# Patient Record
Sex: FEMALE | Race: WHITE | NOT HISPANIC OR LATINO | Employment: OTHER | URBAN - METROPOLITAN AREA
[De-identification: names, ages, dates, MRNs, and addresses within clinical notes are randomized per-mention and may not be internally consistent; named-entity substitution may affect disease eponyms.]

---

## 2017-01-02 DIAGNOSIS — I10 ESSENTIAL (PRIMARY) HYPERTENSION: ICD-10-CM

## 2017-01-02 DIAGNOSIS — E11.9 TYPE 2 DIABETES MELLITUS WITHOUT COMPLICATIONS (HCC): ICD-10-CM

## 2017-01-02 DIAGNOSIS — E78.5 HYPERLIPIDEMIA: ICD-10-CM

## 2017-01-20 ENCOUNTER — ALLSCRIPTS OFFICE VISIT (OUTPATIENT)
Dept: OTHER | Facility: OTHER | Age: 77
End: 2017-01-20

## 2017-02-01 ENCOUNTER — LAB CONVERSION - ENCOUNTER (OUTPATIENT)
Dept: OTHER | Facility: OTHER | Age: 77
End: 2017-02-01

## 2017-02-01 LAB — QUESTION/PROBLEM (HISTORICAL): NORMAL

## 2017-02-04 ENCOUNTER — LAB CONVERSION - ENCOUNTER (OUTPATIENT)
Dept: OTHER | Facility: OTHER | Age: 77
End: 2017-02-04

## 2017-02-04 LAB
CONTACT: (HISTORICAL): NORMAL
FECAL GLOBIN BY IMMUNOCHEM (HISTORICAL): NORMAL
TEST INFORMATION (HISTORICAL): NORMAL
TEST NAME (HISTORICAL): NORMAL

## 2017-05-05 ENCOUNTER — GENERIC CONVERSION - ENCOUNTER (OUTPATIENT)
Dept: OTHER | Facility: OTHER | Age: 77
End: 2017-05-05

## 2017-07-01 DIAGNOSIS — I10 ESSENTIAL (PRIMARY) HYPERTENSION: ICD-10-CM

## 2017-07-01 DIAGNOSIS — E11.9 TYPE 2 DIABETES MELLITUS WITHOUT COMPLICATIONS (HCC): ICD-10-CM

## 2017-07-01 DIAGNOSIS — E78.5 HYPERLIPIDEMIA: ICD-10-CM

## 2017-07-01 DIAGNOSIS — E07.9 DISORDER OF THYROID: ICD-10-CM

## 2017-07-11 ENCOUNTER — LAB CONVERSION - ENCOUNTER (OUTPATIENT)
Dept: OTHER | Facility: OTHER | Age: 77
End: 2017-07-11

## 2017-07-11 LAB
BUN SERPL-MCNC: 19 MG/DL (ref 7–25)
BUN/CREA RATIO (HISTORICAL): 20 (CALC) (ref 6–22)
CALCIUM SERPL-MCNC: 9.6 MG/DL (ref 8.6–10.4)
CHLORIDE SERPL-SCNC: 100 MMOL/L (ref 98–110)
CHOLEST SERPL-MCNC: 153 MG/DL (ref 125–200)
CHOLEST/HDLC SERPL: 2.8 (CALC)
CO2 SERPL-SCNC: 29 MMOL/L (ref 20–31)
CREAT SERPL-MCNC: 0.94 MG/DL (ref 0.6–0.93)
CREATININE, RANDOM URINE (HISTORICAL): 121 MG/DL (ref 20–320)
EGFR AFRICAN AMERICAN (HISTORICAL): 69 ML/MIN/1.73M2
EGFR-AMERICAN CALC (HISTORICAL): 59 ML/MIN/1.73M2
GLUCOSE (HISTORICAL): 162 MG/DL (ref 65–99)
HBA1C MFR BLD HPLC: 7.2 % OF TOTAL HGB
HDLC SERPL-MCNC: 55 MG/DL
LDL CHOLESTEROL (HISTORICAL): 72 MG/DL (CALC)
MAGNESIUM, UR (HISTORICAL): 0.7 MG/DL
MICROALBUMIN/CREATININE RATIO (HISTORICAL): 6 MCG/MG CREAT
NON-HDL-CHOL (CHOL-HDL) (HISTORICAL): 98 MG/DL (CALC)
POTASSIUM SERPL-SCNC: 4.5 MMOL/L (ref 3.5–5.3)
SODIUM SERPL-SCNC: 138 MMOL/L (ref 135–146)
T4 FREE SERPL-MCNC: 1.6 NG/DL (ref 0.8–1.8)
TRIGL SERPL-MCNC: 132 MG/DL
TSH SERPL DL<=0.05 MIU/L-ACNC: 0.37 MIU/L (ref 0.4–4.5)

## 2017-07-18 ENCOUNTER — ALLSCRIPTS OFFICE VISIT (OUTPATIENT)
Dept: OTHER | Facility: OTHER | Age: 77
End: 2017-07-18

## 2017-07-25 ENCOUNTER — ALLSCRIPTS OFFICE VISIT (OUTPATIENT)
Dept: OTHER | Facility: OTHER | Age: 77
End: 2017-07-25

## 2017-08-28 DIAGNOSIS — E07.9 DISORDER OF THYROID: ICD-10-CM

## 2017-09-18 ENCOUNTER — GENERIC CONVERSION - ENCOUNTER (OUTPATIENT)
Dept: OTHER | Facility: OTHER | Age: 77
End: 2017-09-18

## 2017-09-19 ENCOUNTER — APPOINTMENT (OUTPATIENT)
Dept: LAB | Facility: MEDICAL CENTER | Age: 77
End: 2017-09-19
Payer: MEDICARE

## 2017-09-19 DIAGNOSIS — E07.9 DISORDER OF THYROID: ICD-10-CM

## 2017-09-19 LAB — TSH SERPL DL<=0.05 MIU/L-ACNC: 1.05 UIU/ML (ref 0.36–3.74)

## 2017-09-19 PROCEDURE — 84443 ASSAY THYROID STIM HORMONE: CPT

## 2017-09-19 PROCEDURE — 36415 COLL VENOUS BLD VENIPUNCTURE: CPT

## 2018-01-10 NOTE — RESULT NOTES
Verified Results  (1) TSH WITH FT4 REFLEX [id number] 09:18AM [facility] Order Number: [id number]     Test Name Result Flag Reference   TSH 1 050 uIU/mL  0 358-3 740   Patients undergoing fluorescein dye angiography may retain small amounts of fluorescein in the body for 48-72 hours post procedure  Samples containing fluorescein can produce falsely depressed TSH values  If the patient had this procedure,a specimen should be resubmitted post fluorescein clearance            The recommended reference ranges for TSH during pregnancy are as follows:  First trimester 0 1 to 2 5 uIU/mL  Second trimester  0 2 to 3 0 uIU/mL  Third trimester 0 3 to 3 0 uIU/m       Plan  Thyroid disorder    · Levothyroxine Sodium 50 MCG Oral Tablet; TAKE 1 TABLET DAILY

## 2018-01-11 ENCOUNTER — LAB CONVERSION - ENCOUNTER (OUTPATIENT)
Dept: OTHER | Facility: OTHER | Age: 78
End: 2018-01-11

## 2018-01-11 LAB
BUN SERPL-MCNC: 20 MG/DL (ref 7–25)
BUN/CREA RATIO (HISTORICAL): 21 (CALC) (ref 6–22)
CALCIUM SERPL-MCNC: 9.4 MG/DL (ref 8.6–10.4)
CHLORIDE SERPL-SCNC: 102 MMOL/L (ref 98–110)
CO2 SERPL-SCNC: 31 MMOL/L (ref 20–31)
CREAT SERPL-MCNC: 0.94 MG/DL (ref 0.6–0.93)
EGFR AFRICAN AMERICAN (HISTORICAL): 69 ML/MIN/1.73M2
EGFR-AMERICAN CALC (HISTORICAL): 59 ML/MIN/1.73M2
GLUCOSE (HISTORICAL): 135 MG/DL (ref 65–99)
HBA1C MFR BLD HPLC: 6.7 % OF TOTAL HGB
POTASSIUM SERPL-SCNC: 4.4 MMOL/L (ref 3.5–5.3)
SODIUM SERPL-SCNC: 141 MMOL/L (ref 135–146)

## 2018-01-11 NOTE — PROGRESS NOTES
Assessment    1  DMII (diabetes mellitus, type 2) (250 00) (E11 9)   2  Hyperlipidemia (272 4) (E78 5)   3  Hypertension (401 9) (I10)   4  Encounter for preventive health examination (V70 0) (Z00 00)    Plan  DMII (diabetes mellitus, type 2)    · MetFORMIN HCl - 850 MG Oral Tablet; TAKE ONE TABLET BY MOUTH TWICE  DAILY WITH  MORNING  AND  EVENING  MEAL   · *VB - Foot Exam; Status:Complete;   Done: 42TCE6000 02:17PM  DMII (diabetes mellitus, type 2), Health Maintenance, Hyperlipidemia, Hypertension    · Follow-up visit in 1 year Evaluation and Treatment  Follow-up  Status: Complete  Done:  12GRD2075  DMII (diabetes mellitus, type 2), Hyperlipidemia, Hypertension    · (1) BASIC METABOLIC PROFILE; Status:Active; Requested LQW:[id number];    · (1) HEMOGLOBIN A1C; Status:Active; Requested RXL:[id number];    · Follow-up visit in 6 months Evaluation and Treatment  Follow-up  Status: Complete   Done: 59SWT7804  Health Maintenance    · *VB - Fall Risk Assessment  (Dx Z13 89 Screen for Neurologic Disorder);  Status:Complete;   Done: 98AWW0333 02:17PM   · *VB - Urinary Incontinence Screen (Dx Z13 89 Screen for UI); Status:Complete;   Done:  08WAP6292 02:17PM   · *VB-Depression Screening; Status:Complete;   Done: 84NXG4230 02:18PM  Hyperlipidemia    · Simvastatin 10 MG Oral Tablet; take one tablet by mouth every day  Hypertension    · HydroCHLOROthiazide 25 MG Oral Tablet; take one tablet by mouth every  day   · Quinapril HCl - 40 MG Oral Tablet; take one tablet by mouth every day  Need for pneumococcal vaccine    · Prevnar 13 Intramuscular Suspension  Thyroid disorder    · Levothyroxine Sodium 75 MCG Oral Tablet; Take 1 tablet daily   · (1) TSH WITH FT4 REFLEX; Status:Active; Requested for:48Mrs9393;     Discussion/Summary  Impression: Subsequent Annual Wellness Visit       Cardiovascular screening and counseling: the risks and benefits of screening were discussed, screening is current, counseling was given on maintaining a healthy diet and counseling was given on maintaining a healthy weight  Diabetes screening and counseling: screening is current, counseling was given on maintaining a healthy diet, counseling was given on maintaining a healthy weight and counseling was given on ways to improve physical activity  Colorectal cancer screening and counseling: the risks and benefits of screening were discussed and the patient declines screening  Breast cancer screening and counseling: the patient declines screening  Cervical cancer screening and counseling: screening not indicated  Osteoporosis screening and counseling: the risks and benefits of screening were discussed and the patient declines screening  Abdominal aortic aneurysm screening and counseling: screening not indicated  Glaucoma screening and counseling: the risks and benefits of screening were discussed and screening is current  HIV screening and counseling: screening not indicated  Immunizations: the risks and benefits of influenza vaccination were discussed with the patient, influenza vaccine is up to date this year, influenza vaccination is recommended annually, the risks and benefits of pneumococcal vaccination were discussed with the patient, hepatitis B vaccination series is not indicated at this time due to the patient's low risk of carol the disease, the risks and benefits of the Zostavax vaccine were discussed with the patient and the risks and benefits of the Tdap vaccine were discussed with the patient  Advance Directive Planning: complete and up to date  Patient Discussion: plan discussed with the patient, follow-up visit needed in one year  History of Present Illness  Welcome to Medicare and Wellness Visits:   Diet and Physical Activity: She exercises infrequently  Exercise: walking     Functional Ability/Level of Safety: The patient is currently able to do activities of daily living without limitations, able to do instrumental activities of daily living without limitations, able to participate in social activities without limitations and able to drive without limitations  Activities of daily living details: does not need help using the phone, no transportation help needed, does not need help shopping, no meal preparation help needed, does not need help doing housework, does not need help doing laundry, does not need help managing medications and does not need help managing money  Home safety risk factors:  no unfamiliar surroundings, no loose rugs, no poor household lighting, no uneven floors, no household clutter, grab bars in the bathroom and handrails on the stairs  Advance Directives: Advance directives: living will  Co-Managers and Medical Equipment/Suppliers: See Patient Care Team   Reviewed Updated [employer]:   Last Medicare Wellness Visit Information was reviewed, patient interviewed and updates made to the record today  Patient Care Team    Care Team Member Role Specialty Office Number   Alberto Georges M D  Family Medicine [phone]   Janae Allred MD Specialist Cardiology [phone]   Fabio De La Cruz MD Specialist Ophthalmology [phone]     Active Problems    1  Ankle pain, unspecified laterality   2  Arthritis (716 90) (M19 90)   3  Background diabetic retinopathy (250 50,362 01) (E11 319)   4  Breast cancer screening (V76 10) (Z12 39)   5  Candidal intertrigo (112 3) (B37 2)   6  Cataract (366 9) (H26 9)   7  Colon cancer screening (V76 51) (Z12 11)   8  DMII (diabetes mellitus, type 2) (250 00) (E11 9)   9  Encounter for screening mammogram for breast cancer (V76 12) (Z12 31)   10  History of long-term use of multiple prescription drugs (V58 69) (Z92 29)   11  Hyperlipidemia (272 4) (E78 5)   12  Hypertension (401 9) (I10)   13  LBBB (left bundle branch block) (426 3) (I44 7)   14  Need for hepatitis A vaccination (V05 3) (Z23)   15  Need for pneumococcal vaccine (V03 82) (Z23)   16   Osteoporosis screening (V82 81) (Z13 820)   17  Palpitations (785 1) (R00 2)   18  Preoperative evaluation to rule out surgical contraindication (V72 83) (Z01 818)   19  Thyroid disorder (246 9) (E07 9)    Past Medical History    · Former smoker (V15 82) (G41 058)   · History of bursitis (V13 59) (Z87 39)   · History of measles (V12 09) (Z86 19)   · History of Joint Pain In Both Knees   · History of Need for prophylactic vaccination and inoculation against varicella (V05 4)  (Z23)    Surgical History    · History of Appendectomy   · History of  Section   · History of Hip Replacement   · History of Knee Arthroscopy (Therapeutic)   · History of Knee Surgery   · History of Tonsillectomy    Family History  Mother    · Family history of Arthritis (V17 7)   · Family history of CABG   · Family history of Cancer   · Family history of Family Health Status Of Mother - Alive   · Family history of Heart Disease (V17 49)   · Family history of Osteoporosis (V17 81)  Father    · Family history of Father  At Age 67    Social History    · Denied: History of Alcohol   · Alcohol Use (History)   · Caffeine Use   · Former smoker (W05 24) (J21 670)   ·     Current Meds   1  Adult Aspirin Low Strength 81 MG TBDP Recorded   2  HydroCHLOROthiazide 25 MG Oral Tablet; take one tablet by mouth every day; Therapy: 54VYI8959 to [phone]  Requested for: 17YUA9024; Last   SZ:52IRS7844 Ordered   3  Levothyroxine Sodium 88 MCG Oral Tablet; take one tablet by mouth every day; Therapy: 73BUX8350 to (Evaluate:16Xwh6721)  Requested for: 90YHZ2026; Last   Rx:46Aly4128 Ordered   4  Magnesium Oxide 400 MG Oral Tablet; Take 1 tablet daily Recorded   5  MetFORMIN HCl - 850 MG Oral Tablet; TAKE ONE TABLET BY MOUTH TWICE DAILY   WITH  MORNING  AND  EVENING  MEAL; Therapy: 59Yfq0569 to [phone]  Requested for: 34OJK0466; Last   EQ:29SFO0646 Ordered   6   Metoprolol Succinate ER 25 MG Oral Tablet Extended Release 24 Hour; TAKE 1/2 TABLET DAILY  Requested for: 96DZZ3068; Last JN:53GAT2370 Ordered   7  Potassium 99 MG Oral Tablet; Take 1 tablet daily Recorded   8  Quinapril HCl - 40 MG Oral Tablet; take one tablet by mouth every day; Therapy: 50Ame8231 to (Evaluate:20Nov2017)  Requested for: 52BME7424; Last   Rx:90Ulg0701 Ordered   9  Simvastatin 10 MG Oral Tablet; take one tablet by mouth every day; Therapy: 34WSR4714 to (Leedey Cure)  Requested for: 16BXS9603; Last   Rx:24May2017 Ordered   10  Zostavax 67172 UNT/0 65ML Subcutaneous Solution Reconstituted (Zostavax 68900    UNT/0 65ML Subcutaneous Solution Reconstituted); adm  one dose as directed; Therapy: 00LKP1758 to (Last Rx:13Jan2015) Ordered    Allergies    1  No Known Drug Allergies    Immunizations   1 2    Hepatitis A  28-Jan-2015 28-Jul-2015    Influenza  15-Nov-2010 16-Oct-2015    PPSV  12-Jul-2016      Vitals  Signs    Heart Rate: 70  Respiration: 18  Systolic: 990  Diastolic: 58  Weight: 667 lb   BMI Calculated: 41 4  BSA Calculated: 2 14    Results/Data  *VB-Depression Screening 14YHQ2668 02:18PM [facility]     Test Name Result Flag Reference   Depression Scale Result      Depression Screen - Negative For Symptoms     *VB - Foot Exam 37WDJ3096 02:17PM [facility]     Test Name Result Flag Reference   FOOT EXAM 91EWN8798       *VB - Fall Risk Assessment  (Dx Z13 89 Screen for Neurologic Disorder) 12ART8471 02:17PM [facility]     Test Name Result Flag Reference   Falls Risk      No falls in the past year     *VB - Urinary Incontinence Screen (Dx Z13 89 Screen for UI) 95QKP9663 02:17PM [facility]     Test Name Result Flag Reference   Urinary Incontinence Assessment 03HWZ3119         Future Appointments    Date/Time Provider Specialty Site   07/25/2017 09:40 AM Marlyn Russo MD Cardiology [facility] CARDIOLOGY [place]   01/23/2018 10:00 AM MICH Ramsey  [address] [facility]   07/27/2018 09:00 AM MICH Ramsey   [address] [facility] [place]     Signatures   Electronically signed by : MICH Arrieta ; Jul 19 2017  8:46AM EST                       (Author)

## 2018-01-12 VITALS
SYSTOLIC BLOOD PRESSURE: 124 MMHG | WEIGHT: 242.5 LBS | DIASTOLIC BLOOD PRESSURE: 68 MMHG | BODY MASS INDEX: 41.62 KG/M2 | HEART RATE: 80 BPM | RESPIRATION RATE: 18 BRPM

## 2018-01-12 VITALS
HEART RATE: 70 BPM | SYSTOLIC BLOOD PRESSURE: 112 MMHG | DIASTOLIC BLOOD PRESSURE: 58 MMHG | WEIGHT: 245 LBS | RESPIRATION RATE: 18 BRPM

## 2018-01-13 VITALS
BODY MASS INDEX: 40.19 KG/M2 | SYSTOLIC BLOOD PRESSURE: 126 MMHG | HEART RATE: 78 BPM | DIASTOLIC BLOOD PRESSURE: 68 MMHG | WEIGHT: 241.25 LBS | HEIGHT: 65 IN

## 2018-01-13 NOTE — PROGRESS NOTES
Assessment  Assessed    1  Palpitations (785 1) (R00 2)   2  DMII (diabetes mellitus, type 2) (250 00) (E11 9)   3  Hypertension (401 9) (I10)   4  Hyperlipidemia (272 4) (E78 5)   5  LBBB (left bundle branch block) (426 3) (I44 7)    Plan  LBBB (left bundle branch block)    · Follow-up visit in 6 months Evaluation and Treatment  Follow-up  Status: Hold For -  Scheduling  Requested for: 06TYH7196   Ordered; For: LBBB (left bundle branch block); Ordered By: Iván Mitchell Performed:  Due: 07FRK7235  Palpitations    · Metoprolol Succinate ER 25 MG Oral Tablet Extended Release 24 Hour; TAKE  1/2 TABLET DAILY   Rx By: Iván Mitchell; Dispense: 90 Days ; #:45 Tablet Extended Release 24 Hour; Refill: 3; For: Palpitations; BINH = N; Sent To: [facility]    Discussion/Summary  Cardiology Discussion Summary Free Text Note Form [facility]:   1  Possible SVT  Palpitations have improved after starting Metoprolol 12 5 mg daily  14 day event monitor in 10/15 showed minimum HR 57 bpm, maximum HR 96 bpm, average HR 73 bpm  There were occasional PVCs and PACs, one atrial couplet  2 patient activations correlated with sinus rhythm with no ectopy  She has had 2 more episodes of likely SVT, both were short and lasted about 20 minutes  I told her if symptoms persist, she could take another half dose of Metoprolol  If symptoms are becoming more frequent, could increase her basal Metoprolol to 25 mg daily  2  DM  Hgb A1c 6/15 was 6 7%  Hgb A1c 12/15 was 7%  She is on Metformin  3  HL  Lipid panel 6/15 showed total cholesterol 168, HDL 64, LDL 86, TG 91  She is on Simvastatin 10 mg      4  HTN  She is on HCTZ and low dose Metoprolol  BP at goal      5  LBBB  She denies any symptoms of dizziness or lightheadedness  Continue to monitor        History of Present Illness  Cardiology HPI Free Text Note Form [facility]: 69 yo woman with a history of HTN, HL, DM, LBBB, who was admitted to [address] [place] [address] 10/15-10/16/15 for palpitations, lightheadedness, diaphoresis, and dyspnea  While in ED, she had a BM, which apparently broke her symptoms, and she did not have any further palpitations while in the hospital  She states she has had palpitations in the past, once every three weeks, sometimes they would last for a few minutes, sometimes they would last a few hours  Troponin did rise slightly to 0 16  EKG showed sinus rhythm with LBBB, which was seen previously  She had had stress test in 7/14 preoperatively that showed severe anterior wall defect  She had cardiac catheterization at [facility] 7/14 which showed normal coronary arteries, no significant obstructive CAD  Echo 10/15/15 showed LV size normal, LV function lower limits of normal with EF 55%  There was septal dyskinesis, likely due to LBBB  Grade I diastolic dysfunction  Normal RV size and function  Mild MAC with mild MR  Aortic sclerosis with mild regurgitation  It was recommended she have outpatient 2 week event monitor for further evaluation of possible SVT  She was also started on Toprol 12 5 mg daily to help prevent recurrence  14 day event monitor in 10/15 showed minimum HR 57 bpm, maximum HR 96 bpm, average HR 73 bpm  There were occasional PVCs and PACs, one atrial couplet  2 patient activations correlated with sinus rhythm with no ectopy  She did not     Since discharge, she had one episode of palpitations on 10/18/15, lasted for 2 5 hours, she states having a BM made if resolve  She had another episode in November 2015, and another episode beginning of January 2016, those two episodes lasted about 20 minutes  She feels that overall, palpitations are occurring less frequently than before she started Metoprolol  No chest pain, shortness of breath  No LE edema  She does not do any formal exercise, but is able to go up and down the steps without exertional symptoms  No orthopnea, uses 1 pillow to sleep, no PND  No snoring  No dizziness or lightheadedness  Review of Systems  Cardiology Female ROS:     Cardiac: palpitations present , but no chest pain, no rhythm problems, no fainting/blackouts, no heart murmur present and no signs of swelling  Skin: No complaints of nonhealing sores or skin rash  Genitourinary: post menopausal, but no recurrent urinary tract infections, no frequent urination at night, no difficult urination, no blood in urine, no kidney stones, no loss of bladder control, no kidney problems, no birth control or hormone replacement therapy and not pregnant   Psychological: No complaints of feeling depressed, anxiety, panic attacks, or difficulty concentrating  General: No complaints of trouble sleeping, lack of energy, fatigue, appetite changes, weight changes, fever, frequent infections, or night sweats  Respiratory: shortness of breath, but no cough/sputum, no wheezing, no phlegm and no hemoptysis  HEENT: No complaints of serious problems, hearing problems, nose problems, throat problems, or snoring  Gastrointestinal: No complaints of liver problems, nausea, vomiting, heartburn, constipation, bloody stools, diarrhea, problems swallowing, adbominal pain, or rectal bleeding  Hematologic: No complaints of bleeding disorders, anemia, blood clots, or excessive brusing  Neurological: No complaints of numbness, tingling, dizziness, weakness, seizures, headaches, syncope or fainting, AM fatigue, daytime sleepiness, no witnessed apnea episodes  Musculoskeletal: arthritis, but no back pain and no swelling/pain       ROS Reviewed:   ROS reviewed  Active Problems  Problems    1  Ankle pain, unspecified laterality (719 47) (M25 579)   2  Arthritis (716 90) (M19 90)   3  Background diabetic retinopathy (250 50,362 01) (E11 319)   4  Candidal intertrigo (112 3) (B37 2)   5  Cataract (366 9) (H26 9)   6  DMII (diabetes mellitus, type 2) (250 00) (E11 9)   7  History of long-term use of multiple prescription drugs (V58 69) (Z92 29)   8  Hyperlipidemia (272 4) (E78 5)   9  Hypertension (401 9) (I10)   10  Need for hepatitis A vaccination (V05 3) (Z23)   11  Palpitations (785 1) (R00 2)   12  Preoperative evaluation to rule out surgical contraindication (V72 83) (Z01 818)   13  Thyroid disorder (246 9) (E07 9)    Past Medical History  Problems    1  Former smoker (V15 82) (A35 688)   2  History of bursitis (V13 59) (Z87 39)   3  History of measles (V12 09) (Z86 19)   4  History of Joint Pain In Both Knees   5  History of Need for prophylactic vaccination and inoculation against varicella (V05 4)   (Z23)  Active Problems And Past Medical History Reviewed: The active problems and past medical history were reviewed and updated today  Surgical History  Problems    1  History of Appendectomy   2  History of  Section   3  History of Hip Replacement   4  History of Knee Arthroscopy   5  History of Knee Surgery   6  History of Tonsillectomy  Surgical History Reviewed: The surgical history was reviewed and updated today  Family History  Mother    1  Family history of Arthritis (V17 7)   2  Family history of CABG   3  Family history of Cancer   4  Family history of Family Health Status Of Mother - Alive   5  Family history of Heart Disease (V17 49)   6  Family history of Osteoporosis (V17 81)  Father    9  Family history of Father  At Age 67  Family History Reviewed: The family history was reviewed and updated today  Social History  Problems    · Denied: History of Alcohol   · Alcohol Use (History)   · Caffeine Use   · Former smoker (P75 10) (W33 071)   ·   Social History Reviewed: The social history was reviewed and updated today  Current Meds   1  Adult Aspirin Low Strength 81 MG TBDP Recorded   2  Hydrochlorothiazide 25 MG Oral Tablet; take one tablet by mouth every day; Therapy: 19IVS6847 to (Evaluate:51Pws5470)  Requested for: 61KSU4548; Last   Rx:2015 Ordered   3   Levothyroxine Sodium 88 MCG Oral Tablet; take one tablet by mouth every day; Therapy: 13WWI3884 to (Kallie Elaine)  Requested for: 29XXW7042; Last   Rx:36Yqd2713 Ordered   4  MetFORMIN HCl - 850 MG Oral Tablet; TAKE ONE TABLET BY MOUTH TWICE DAILY   WITH  MORNING  AND  EVENING  MEAL; Therapy: 79RTC4549 to (Evaluate:14May2016)  Requested for: 46NJN7790; Last   Rx:16Nov2015 Ordered   5  Metoprolol Succinate ER 25 MG Oral Tablet Extended Release 24 Hour; TAKE 1/2   TABLET DAILY  Requested for: 77VIO9230; Last Rx:04Nov2015 Ordered   6  Quinapril HCl - 40 MG Oral Tablet; take one tablet by mouth every day; Therapy: 65Iwu9061 to (Evaluate:14May2016)  Requested for: 21JMD7975; Last   Rx:16Nov2015 Ordered   7  Simvastatin 10 MG Oral Tablet; take one tablet by mouth every day; Therapy: 46HGM6579 to (Evaluate:11May2016)  Requested for: 88PLZ0807; Last   Rx:13Nov2015 Ordered   8  Zostavax 41390 UNT/0 65ML Subcutaneous Solution Reconstituted (Zostavax 40687   UNT/0 65ML Subcutaneous Solution Reconstituted); adm  one dose as directed; Therapy: 02RTQ5601 to (Last Rx:13Jan2015) Ordered  Medication List Reviewed: The medication list was reviewed and updated today  Allergies  Medication    1  No Known Drug Allergies    Vitals  Vital Signs [Data Includes: Current Encounter]    Recorded: 20XGD5220 03:55PM   Heart Rate 85   Systolic 503   Diastolic 76   Height 5 ft 4 5 in   Weight 244 lb 4 96 oz   BMI Calculated 41 29   BSA Calculated 2 14   O2 Saturation 97     Physical Exam    Constitutional   General appearance: Abnormal   obese  Eyes   Conjunctiva and Sclera examination: Conjunctiva pink, sclera anicteric  Ears, Nose, Mouth, and Throat - Oropharynx: Clear, nares are clear, mucous membranes are moist    Neck   Neck and thyroid: Normal, supple, trachea midline, no thyromegaly  Pulmonary   Respiratory effort: No increased work of breathing or signs of respiratory distress      Auscultation of lungs: Clear to auscultation, no rales, no rhonchi, no wheezing, good air movement  Cardiovascular   Palpation of heart: Normal PMI, no thrills  Auscultation of heart: Normal rate and rhythm, normal S1 and S2, no murmurs  Carotid pulses: Normal, 2+ bilaterally  Examination of extremities for edema and/or varicosities: Normal     Chest - Chest: Normal    Abdomen   Abdomen: Abnormal   Obese, soft, NT, ND, normoactive BS  Musculoskeletal Gait and station: Normal gait  Skin - Skin and subcutaneous tissue: Normal without rashes or lesions  Skin is warm and well perfused, normal turgor  Neurologic - Speech: Normal     Psychiatric - Orientation to person, place, and time: Normal  Mood and affect: Normal       Future Appointments    Date/Time Provider Specialty Site   07/12/2016 03:00 PM MICH Bell  [address] [facility] [place]     Signatures   Electronically signed by :  Pearl Calzada MD; Jan 18 2016  4:08PM EST                       (Author)

## 2018-01-14 NOTE — PROGRESS NOTES
Assessment    1  DMII (diabetes mellitus, type 2) (250 00) (E11 9)   2  Hyperlipidemia (272 4) (E78 5)   3  Hypertension (401 9) (I10)   4  Encounter for preventive health examination (V70 0) (Z00 00)    Plan  DMII (diabetes mellitus, type 2)    · MetFORMIN HCl - 850 MG Oral Tablet; TAKE ONE TABLET BY MOUTH TWICE  DAILY WITH  MORNING  AND  EVENING  MEAL   · *VB - Foot Exam; Status:Complete;   Done: 36MYK1772 02:17PM  DMII (diabetes mellitus, type 2), Health Maintenance, Hyperlipidemia, Hypertension    · Follow-up visit in 1 year Evaluation and Treatment  Follow-up  Status: Complete  Done:  37PHH6469  DMII (diabetes mellitus, type 2), Hyperlipidemia, Hypertension    · (1) BASIC METABOLIC PROFILE; Status:Active; Requested KNP:65WGX4838;    · (1) HEMOGLOBIN A1C; Status:Active; Requested IFP:22JCC2976;    · Follow-up visit in 6 months Evaluation and Treatment  Follow-up  Status: Complete   Done: 93LJH5565  Health Maintenance    · *VB - Fall Risk Assessment  (Dx Z13 89 Screen for Neurologic Disorder);  Status:Complete;   Done: 16ZDR1672 02:17PM   · *VB - Urinary Incontinence Screen (Dx Z13 89 Screen for UI); Status:Complete;   Done:  14DUF3322 02:17PM   · *VB-Depression Screening; Status:Complete;   Done: 42NUR3121 02:18PM  Hyperlipidemia    · Simvastatin 10 MG Oral Tablet; take one tablet by mouth every day  Hypertension    · HydroCHLOROthiazide 25 MG Oral Tablet; take one tablet by mouth every day   · Quinapril HCl - 40 MG Oral Tablet; take one tablet by mouth every day  Need for pneumococcal vaccine    · Prevnar 13 Intramuscular Suspension  Thyroid disorder    · Levothyroxine Sodium 75 MCG Oral Tablet; Take 1 tablet daily   · (1) TSH WITH FT4 REFLEX; Status:Active; Requested for:56Lcn8379; History of Present Illness  The patient states her hyperlipidemia has been under good control since the last visit  Comorbid Illnesses: diabetes mellitus and hypertension  She has no significant interval events     Symptoms: Dyspepsia would like omeprazole sent to the pharmacy.  Said you had discussed this with her.   The patient is currently asymptomatic  Associated symptoms include no focal neurologic deficits and no memory loss  Lifestyle: Diet: She consumes a diverse and healthy diet  Weight Issues: She has weight concerns  Exercise: She does not exercise regularly  Smoking: She does not use tobacco Alcohol: She denies alcohol use  Drug Use: She denies drug use  Medications: the patient is adherent with her medication regimen  She denies medication side effects  Medication(s): a statin  The patient is doing well with her hyperlipidemia goals  the patient's last LDL was 73 mg/dL  The patient states she has been doing well with her blood pressure control since the last visit  She has no comorbid illnesses  She has no significant interval events  Symptoms: The patient is currently asymptomatic  Associated symptoms include no headache, no focal neurologic deficits and no memory loss  Home monitoring: The patient is not checking blood pressure at home  Medications: the patient is adherent with her medication regimen  She denies medication side effects  Medication(s): a diuretic and an ACE inhibitor  Disease Management: the patient is doing well with her blood pressure goals  The patient states she has been doing well with her Type II Diabetes control since the last visit  Comorbid Illnesses: hypertension and hyperlipidemia  She has no known diabetic complications  She has no significant interval events  Symptoms: The patient is currently asymptomatic  Associated symptoms include no polyuria, no polydipsia and no recent weight gain  Home monitoring: The patient checks her blood sugars regularly  Glycemic control has been good  Medications: Medication(s): Metformin HCl  The patient is doing well with her diabetes goals Goals for diabetes management: Hemoglobin A1C: at or near goal, rising but stil low 7s   Lipids: at or near goal  Blood pressure: at or near goal  Weight: not yet at goal  Exercise: not yet at goal  Review of Systems    Constitutional: No fever, no chills, feels well, no tiredness, no recent weight gain or weight loss  Eyes: No complaints of eye pain, no red eyes, no eyesight problems, no discharge, no dry eyes, no itching of eyes  ENT: no complaints of earache, no loss of hearing, no nose bleeds, no nasal discharge, no sore throat, no hoarseness  Cardiovascular: No complaints of slow heart rate, no fast heart rate, no chest pain, no palpitations, no leg claudication, no lower extremity edema  Respiratory: No complaints of shortness of breath, no wheezing, no cough, no SOB on exertion, no orthopnea, no PND  Gastrointestinal: No complaints of abdominal pain, no constipation, no nausea or vomiting, no diarrhea, no bloody stools  Genitourinary: No complaints of dysuria, no incontinence, no pelvic pain, no dysmenorrhea, no vaginal discharge or bleeding  Musculoskeletal: No complaints of arthralgias, no myalgias, no joint swelling or stiffness, no limb pain or swelling  Active Problems    1  Ankle pain, unspecified laterality   2  Arthritis (716 90) (M19 90)   3  Background diabetic retinopathy (250 50,362 01) (E11 319)   4  Breast cancer screening (V76 10) (Z12 39)   5  Candidal intertrigo (112 3) (B37 2)   6  Cataract (366 9) (H26 9)   7  Colon cancer screening (V76 51) (Z12 11)   8  DMII (diabetes mellitus, type 2) (250 00) (E11 9)   9  Encounter for screening mammogram for breast cancer (V76 12) (Z12 31)   10  History of long-term use of multiple prescription drugs (V58 69) (Z92 29)   11  Hyperlipidemia (272 4) (E78 5)   12  Hypertension (401 9) (I10)   13  LBBB (left bundle branch block) (426 3) (I44 7)   14  Need for hepatitis A vaccination (V05 3) (Z23)   15  Need for pneumococcal vaccine (V03 82) (Z23)   16  Osteoporosis screening (V82 81) (Z13 820)   17  Palpitations (785 1) (R00 2)   18  Preoperative evaluation to rule out surgical contraindication (V72 83) (Z01 818)   19   Thyroid disorder (246 9) (E07 9)    Past Medical History    1  Former smoker (V15 82) (B30 618)   2  History of bursitis (V13 59) (Z87 39)   3  History of measles (V12 09) (Z86 19)   4  History of Joint Pain In Both Knees   5  History of Need for prophylactic vaccination and inoculation against varicella (V05 4)   (Z23)    Surgical History    1  History of Appendectomy   2  History of  Section   3  History of Hip Replacement   4  History of Knee Arthroscopy (Therapeutic)   5  History of Knee Surgery   6  History of Tonsillectomy    The surgical history was reviewed and updated today  Family History  Mother    1  Family history of Arthritis (V17 7)   2  Family history of CABG   3  Family history of Cancer   4  Family history of Family Health Status Of Mother - Alive   5  Family history of Heart Disease (V17 49)   6  Family history of Osteoporosis (V17 81)  Father    9  Family history of Father  At Age 67    Social History    · Denied: History of Alcohol   · Alcohol Use (History)   · Caffeine Use   · Former smoker (T60 26) (U60 992)   ·   The social history was reviewed and updated today  The social history was reviewed and is unchanged  Current Meds   1  Adult Aspirin Low Strength 81 MG TBDP Recorded   2  HydroCHLOROthiazide 25 MG Oral Tablet; take one tablet by mouth every day; Therapy: 72ZHI2440 to [phone]  Requested for: 82SSY9064; Last   Luxembourgish:84HNQ8895 Ordered   3  Levothyroxine Sodium 88 MCG Oral Tablet; take one tablet by mouth every day; Therapy: 96ORD2848 to (Evaluate:44Usu8826)  Requested for: 32CSI1001; Last   Rx:38Fhf4175 Ordered   4  Magnesium Oxide 400 MG Oral Tablet; Take 1 tablet daily Recorded   5  MetFORMIN HCl - 850 MG Oral Tablet; TAKE ONE TABLET BY MOUTH TWICE DAILY   WITH  MORNING  AND  EVENING  MEAL; Therapy: 77Jtu2931 to [phone])  Requested for: 97OMG8768; Last   VX:88ZKO7586 Ordered   6   Metoprolol Succinate ER 25 MG Oral Tablet Extended Release 24 Hour; TAKE 1/2   TABLET DAILY  Requested for: 50RKR0448; Last VM:11MTT4993 Ordered   7  Potassium 99 MG Oral Tablet; Take 1 tablet daily Recorded   8  Quinapril HCl - 40 MG Oral Tablet; take one tablet by mouth every day; Therapy: 38Dpl4134 to (Evaluate:20Nov2017)  Requested for: 63QSL3273; Last   Rx:29Ssl5586 Ordered   9  Simvastatin 10 MG Oral Tablet; take one tablet by mouth every day; Therapy: 55XJV3860 to (Sylvain Panda)  Requested for: 75REI1136; Last   Rx:24May2017 Ordered   10  Zostavax 29928 UNT/0 65ML Subcutaneous Solution Reconstituted; adm  one dose as    directed; Therapy: 66HHE4481 to (Last Rx:13Jan2015) Ordered    The medication list was reviewed and updated today  Allergies    1  No Known Drug Allergies    Vitals  Vital Signs    Recorded: 14JSM2938 02:03PM   Heart Rate 70   Respiration 18   Systolic 190   Diastolic 58   Weight 050 lb    BMI Calculated 41 4   BSA Calculated 2 14     Physical Exam    Constitutional   General appearance: No acute distress, well appearing and well nourished  obese  Ears, Nose, Mouth, and Throat   External inspection of ears and nose: Normal     Otoscopic examination: Tympanic membranes translucent with normal light reflex  Canals patent without erythema  Nasal mucosa, septum, and turbinates: Normal without edema or erythema  Oropharynx: Normal with no erythema, edema, exudate or lesions  Pulmonary   Auscultation of lungs: Clear to auscultation  Cardiovascular   Auscultation of heart: Normal rate and rhythm, normal S1 and S2, without murmurs  Musculoskeletal   Gait and station: Normal     Digits and nails: Normal without clubbing or cyanosis  Socks and shoes removed, Right Foot Findings: normal foot, no swelling, no erythema  The right toes were normal and had full range of motion  The sensory exam showed normal vibratory sensation at the level of the toes on the right   Normal tactile sensation with monofilament testing throughout the right foot  Socks and shoes removed, Left Foot Findings: normal foot, no swelling, no erythema  The left toes were normal and had full range of motion  The sensory exam showed normal vibratory sensation at the level of the toes on the left  Normal tactile sensation with monofilament testing throughout the left foot  Capillary refills findings on the right were normal in the toes  Pulses:   2+ in the posterior tibialis on the right   2+ in the dorsalis pedis on the right  Capillary refills findings on the left were normal in the toes  Pulses:   2+ in the posterior tibialis on the left   2+ in the dorsalis pedis on the left  Assign Risk Category: 0: No loss of protective sensation, no deformity  No present risk  Results/Data  *VB-Depression Screening 31MWI9313 02:18PM Haskel Cake     Test Name Result Flag Reference   Depression Scale Result      Depression Screen - Negative For Symptoms     *VB - Foot Exam 79LOP1203 02:17PM Haskel Cake     Test Name Result Flag Reference   FOOT EXAM 84IJT8437       *VB - Fall Risk Assessment  (Dx Z13 89 Screen for Neurologic Disorder) 57GII0899 02:17PM Haskel Cake     Test Name Result Flag Reference   Falls Risk      No falls in the past year     *VB - Urinary Incontinence Screen (Dx Z13 89 Screen for UI) 22ZDT9778 02:17PM Haskel Cake     Test Name Result Flag Reference   Urinary Incontinence Assessment 03ZKW4819         Future Appointments    Date/Time Provider Specialty Site   07/25/2017 09:40 AM Saurabh Alvarez MD Cardiology [facility] CARDIOLOGY [place]   01/23/2018 10:00 AM MICH Paz  [address] [facility]   07/27/2018 09:00 AM MICH Paz   [address] [facility]     Signatures   Electronically signed by : MICH Barnett ; Jul 19 2017  8:41AM EST                       (Author)

## 2018-01-16 ENCOUNTER — GENERIC CONVERSION - ENCOUNTER (OUTPATIENT)
Dept: OTHER | Facility: OTHER | Age: 78
End: 2018-01-16

## 2018-01-23 ENCOUNTER — ALLSCRIPTS OFFICE VISIT (OUTPATIENT)
Dept: OTHER | Facility: OTHER | Age: 78
End: 2018-01-23

## 2018-01-23 NOTE — RESULT NOTES
Verified Results  (1) BASIC METABOLIC PROFILE 37LJP1757 09:22AM Janice Humphreymyles     Test Name Result Flag Reference   GLUCOSE 135 mg/dL H 65-99   Fasting reference interval     For someone without known diabetes, a glucose  value >125 mg/dL indicates that they may have  diabetes and this should be confirmed with a  follow-up test    UREA NITROGEN (BUN) 20 mg/dL  7-25   CREATININE 0 94 mg/dL H 0 60-0 93   For patients >52years of age, the reference limit  for Creatinine is approximately 13% higher for people  identified as -American  eGFR NON-AFR  AMERICAN 59 mL/min/1 73m2 L > OR = 60   eGFR AFRICAN AMERICAN 69 mL/min/1 73m2  > OR = 60   BUN/CREATININE RATIO 21 (calc)  6-22   SODIUM 141 mmol/L  135-146   POTASSIUM 4 4 mmol/L  3 5-5 3   CHLORIDE 102 mmol/L     CARBON DIOXIDE 31 mmol/L  20-31   CALCIUM 9 4 mg/dL  8 6-10 4     (Q) HEMOGLOBIN A1c 05VGJ4002 09:22AM Janice Albina   REPORT COMMENT:  FASTING:YES     Test Name Result Flag Reference   HEMOGLOBIN A1c 6 7 % of total Hgb H <5 7   For someone without known diabetes, a hemoglobin A1c  value of 6 5% or greater indicates that they may have   diabetes and this should be confirmed with a follow-up   test      For someone with known diabetes, a value <7% indicates   that their diabetes is well controlled and a value   greater than or equal to 7% indicates suboptimal   control  A1c targets should be individualized based on   duration of diabetes, age, comorbid conditions, and   other considerations  Currently, no consensus exists regarding use of  hemoglobin A1c for diagnosis of diabetes for children

## 2018-01-24 NOTE — PROGRESS NOTES
Assessment    1  DMII (diabetes mellitus, type 2) (250 00) (E11 9)   2  Hyperlipidemia (272 4) (E78 5)   3  Hypertension (401 9) (I10)    Plan  DMII (diabetes mellitus, type 2)    · MetFORMIN HCl - 850 MG Oral Tablet; TAKE ONE TABLET BY MOUTH TWICE  DAILY WITH  MORNING  AND  EVENING  MEAL  DMII (diabetes mellitus, type 2), Hyperlipidemia, Hypertension    · (1) BASIC METABOLIC PROFILE; Status:Active; Requested for:09Jul2018;    · (1) HEMOGLOBIN A1C; Status:Active; Requested for:09Jul2018;    · (1) LIPID PANEL FASTING W DIRECT LDL REFLEX; Status:Active; Requested  for:09Jul2018;    · (1) MICROALBUMIN CREATININE RATIO, RANDOM URINE; Status:Active; Requested  for:09Jul2018;    · *VB - Foot Exam; Status:Active; Requested for:09Jul2018;   Hyperlipidemia    · Simvastatin 10 MG Oral Tablet; take one tablet by mouth every day  Hypertension    · HydroCHLOROthiazide 25 MG Oral Tablet; take one tablet by mouth every day   · Quinapril HCl - 40 MG Oral Tablet; take one tablet by mouth every day  Thyroid disorder    · Levothyroxine Sodium 50 MCG Oral Tablet; TAKE 1 TABLET DAILY   · (1) TSH WITH FT4 REFLEX; Status:Active; Requested for:09Jul2018; Next visit in July     Chief Complaint  The patient is here today for a follow up  History of Present Illness  Patient is here for follow-up of diabetes, hypertension, and hyperlipidemia  Also hypothyroidism  Her diabetes is doing very well  Her last A1c was 6 7  She has lost a couple of lb which is helping  She is watching her diet a little more  She does do home glucose monitoring in the numbers have always been in the 130s  Her only medication is for diabetes is metformin 850 mg b i d  She is also treated for hypertension  She takes quinapril and hydrochlorothiazide  She is also taking metoprolol but that is primarily for her occasional palpitations  Her blood pressures have been well controlled   Except for the occasional palpitations or cardiac review of systems is negative  She takes simvastatin for her hyperlipidemia  Her last LDL was last summer and her LDL was 72  She has hypothyroidism  She takes levothyroxine  Her dose is 50 mcg daily  Her last TSH last fall was well in the therapeutic range  Review of Systems    Constitutional: No fever, no chills, feels well, no tiredness, no recent weight gain or weight loss  Eyes: No complaints of eye pain, no red eyes, no eyesight problems, no discharge, no dry eyes, no itching of eyes  ENT: no complaints of earache, no loss of hearing, no nose bleeds, no nasal discharge, no sore throat, no hoarseness  Cardiovascular: occasional palpitations when she walks in the mall, but No complaints of slow heart rate, no fast heart rate, no chest pain, no palpitations, no leg claudication, no lower extremity edema  Respiratory: No complaints of shortness of breath, no wheezing, no cough, no SOB on exertion, no orthopnea, no PND  Gastrointestinal: Has occasional runny stools after going out for breakfast, but No complaints of abdominal pain, no constipation, no nausea or vomiting, no diarrhea, no bloody stools  Genitourinary: No complaints of dysuria, no incontinence, no pelvic pain, no dysmenorrhea, no vaginal discharge or bleeding  Musculoskeletal: No complaints of arthralgias, no myalgias, no joint swelling or stiffness, no limb pain or swelling  Integumentary: No complaints of skin rash or lesions, no itching, no skin wounds, no breast pain or lump  Neurological: No complaints of headache, no confusion, no convulsions, no numbness, no dizziness or fainting, no tingling, no limb weakness, no difficulty walking  Psychiatric: Not suicidal, no sleep disturbance, no anxiety or depression, no change in personality, no emotional problems  Endocrine: No complaints of proptosis, no hot flashes, no muscle weakness, no deepening of the voice, no feelings of weakness     Hematologic/Lymphatic: No complaints of swollen glands, no swollen glands in the neck, does not bleed easily, does not bruise easily  Active Problems    1  Ankle pain, unspecified laterality   2  Arthritis (716 90) (M19 90)   3  Background diabetic retinopathy (250 50,362 01) (E11 319)   4  Breast cancer screening (V76 10) (Z12 39)   5  Candidal intertrigo (112 3) (B37 2)   6  Cataract (366 9) (H26 9)   7  Colon cancer screening (V76 51) (Z12 11)   8  DMII (diabetes mellitus, type 2) (250 00) (E11 9)   9  Encounter for screening mammogram for breast cancer (V76 12) (Z12 31)   10  History of long-term use of multiple prescription drugs (V58 69) (Z92 29)   11  Hyperlipidemia (272 4) (E78 5)   12  Hypertension (401 9) (I10)   13  LBBB (left bundle branch block) (426 3) (I44 7)   14  Need for hepatitis A vaccination (V05 3) (Z23)   15  Need for pneumococcal vaccine (V03 82) (Z23)   16  Osteoporosis screening (V82 81) (Z13 820)   17  Palpitations (785 1) (R00 2)   18  Preoperative evaluation to rule out surgical contraindication (V72 83) (Z01 818)   19  Thyroid disorder (246 9) (E07 9)    Past Medical History    1  Former smoker (V15 82) (V53 523)   2  History of bursitis (V13 59) (Z87 39)   3  History of measles (V12 09) (Z86 19)   4  History of Joint Pain In Both Knees   5  History of Need for prophylactic vaccination and inoculation against varicella (V05 4)   (Z23)    The active problems and past medical history were reviewed and updated today  Surgical History    1  History of Appendectomy   2  History of  Section   3  History of Hip Replacement   4  History of Knee Arthroscopy (Therapeutic)   5  History of Knee Surgery   6  History of Tonsillectomy    The surgical history was reviewed and updated today  Family History  Mother    1  Family history of Arthritis (V17 7)   2  Family history of CABG   3  Family history of Cancer   4  Family history of Family Health Status Of Mother - Alive   5  Family history of Heart Disease (V17 49)   6  Family history of Osteoporosis (V17 81)  Father    9  Family history of Father  At Age 67    The family history was reviewed and updated today  Social History    · Denied: History of Alcohol   · Alcohol Use (History)   · Caffeine Use   · Former smoker (S52 83) (W84 607)   ·   The social history was reviewed and updated today  The social history was reviewed and is unchanged  Current Meds   1  Adult Aspirin Low Strength 81 MG TBDP Recorded   2  HydroCHLOROthiazide 25 MG Oral Tablet; take one tablet by mouth every day; Therapy: 59DHI6015 to (-[id number])  Requested for: 2017; Last   GE:28KUH4376 Ordered   3  Levothyroxine Sodium 50 MCG Oral Tablet; TAKE 1 TABLET DAILY; Therapy: 57Nmg1128 to (Evaluate:2018)  Requested for: 23Txt6915; Last   Rx:30Omb2209 Ordered   4  Magnesium Oxide 400 MG Oral Tablet; Take 1 tablet daily Recorded   5  MetFORMIN HCl - 850 MG Oral Tablet; TAKE ONE TABLET BY MOUTH TWICE DAILY   WITH  MORNING  AND  EVENING  MEAL; Therapy: 78CRT2834 to (Ana Sharp)  Requested for: 86TSD6136; Last   Rx:2017 Ordered   6  Metoprolol Succinate ER 25 MG Oral Tablet Extended Release 24 Hour; TAKE 1/2   TABLET DAILY  Requested for: 28XMT6471; Last Rx:2017 Ordered   7  Potassium 99 MG Oral Tablet; Take 1 tablet daily Recorded   8  Quinapril HCl - 40 MG Oral Tablet; take one tablet by mouth every day; Therapy: 20Vnz6872 to (Evaluate:55Joq0217)  Requested for: 92BIV2080; Last   Rx:2017 Ordered   9  Simvastatin 10 MG Oral Tablet; take one tablet by mouth every day; Therapy: 12VER1529 to (Ana Sharp)  Requested for: 53XNA7173; Last   Rx:2017 Ordered    The medication list was reviewed and updated today  Allergies    1   No Known Drug Allergies    Vitals  Vital Signs    Recorded: 15TNS8475 09:40AM   Heart Rate 64   Respiration 18   Systolic 353   Diastolic 68   Weight 087 lb    BMI Calculated 40 22   BSA Calculated 2 12 Physical Exam    Constitutional   General appearance: No acute distress, well appearing and well nourished  Eyes   Conjunctiva and lids: No swelling, erythema or discharge  Pupils and irises: Equal, round and reactive to light  Ears, Nose, Mouth, and Throat   External inspection of ears and nose: Normal     Otoscopic examination: Tympanic membranes translucent with normal light reflex  Canals patent without erythema  Nasal mucosa, septum, and turbinates: Normal without edema or erythema  Oropharynx: Normal with no erythema, edema, exudate or lesions  Pulmonary   Respiratory effort: No increased work of breathing or signs of respiratory distress  Auscultation of lungs: Clear to auscultation  Cardiovascular   Palpation of heart: Normal PMI, no thrills  Auscultation of heart: Normal rate and rhythm, normal S1 and S2, without murmurs  Examination of extremities for edema and/or varicosities: Normal     Carotid pulses: Normal     Lymphatic   Palpation of lymph nodes in neck: No lymphadenopathy  Musculoskeletal   Gait and station: Normal     Digits and nails: Normal without clubbing or cyanosis  Inspection/palpation of joints, bones, and muscles: Normal     Skin   Skin and subcutaneous tissue: Normal without rashes or lesions  Psychiatric   Orientation to person, place, and time: Normal     Mood and affect: Normal          Future Appointments    Date/Time Provider Specialty Site   07/27/2018 09:00 AM MICH Millan   [address] [facility]     Signatures   Electronically signed by : MICH Lerma ; Jan 23 2018  9:57AM EST                       (Author)

## 2018-03-12 DIAGNOSIS — E03.9 HYPOTHYROIDISM, UNSPECIFIED TYPE: Primary | ICD-10-CM

## 2018-03-12 RX ORDER — LEVOTHYROXINE SODIUM 0.05 MG/1
50 TABLET ORAL DAILY
Qty: 90 TABLET | Refills: 1 | Status: SHIPPED | OUTPATIENT
Start: 2018-03-12 | End: 2018-09-12 | Stop reason: SDUPTHER

## 2018-03-12 RX ORDER — LEVOTHYROXINE SODIUM 0.05 MG/1
1 TABLET ORAL DAILY
COMMUNITY
Start: 2017-08-24 | End: 2018-03-12 | Stop reason: SDUPTHER

## 2018-04-23 DIAGNOSIS — I10 ESSENTIAL HYPERTENSION: Primary | ICD-10-CM

## 2018-04-23 RX ORDER — HYDROCHLOROTHIAZIDE 25 MG/1
1 TABLET ORAL DAILY
COMMUNITY
Start: 2011-01-21 | End: 2018-04-23 | Stop reason: SDUPTHER

## 2018-04-23 RX ORDER — HYDROCHLOROTHIAZIDE 25 MG/1
25 TABLET ORAL DAILY
Qty: 90 TABLET | Refills: 1 | Status: SHIPPED | OUTPATIENT
Start: 2018-04-23 | End: 2018-04-25 | Stop reason: SDUPTHER

## 2018-04-25 DIAGNOSIS — I10 ESSENTIAL HYPERTENSION: ICD-10-CM

## 2018-04-25 DIAGNOSIS — E78.2 MIXED HYPERLIPIDEMIA: Primary | ICD-10-CM

## 2018-04-30 RX ORDER — HYDROCHLOROTHIAZIDE 25 MG/1
TABLET ORAL
Qty: 90 TABLET | Refills: 1 | Status: SHIPPED | OUTPATIENT
Start: 2018-04-30 | End: 2019-04-04 | Stop reason: SDUPTHER

## 2018-04-30 RX ORDER — SIMVASTATIN 10 MG
TABLET ORAL
Qty: 90 TABLET | Refills: 1 | Status: SHIPPED | OUTPATIENT
Start: 2018-04-30 | End: 2018-11-05 | Stop reason: SDUPTHER

## 2018-05-06 DIAGNOSIS — E11.9 TYPE 2 DIABETES MELLITUS WITHOUT COMPLICATION, WITHOUT LONG-TERM CURRENT USE OF INSULIN (HCC): Primary | ICD-10-CM

## 2018-05-21 DIAGNOSIS — I10 HYPERTENSION, UNSPECIFIED TYPE: Primary | ICD-10-CM

## 2018-05-21 RX ORDER — QUINAPRIL 40 MG/1
1 TABLET ORAL DAILY
COMMUNITY
Start: 2011-04-11 | End: 2018-05-21 | Stop reason: SDUPTHER

## 2018-05-22 RX ORDER — QUINAPRIL 40 MG/1
40 TABLET ORAL DAILY
Qty: 90 TABLET | Refills: 1 | Status: SHIPPED | OUTPATIENT
Start: 2018-05-22 | End: 2018-11-12 | Stop reason: SDUPTHER

## 2018-07-03 PROBLEM — E78.5 DYSLIPIDEMIA: Status: ACTIVE | Noted: 2018-07-03

## 2018-07-03 PROBLEM — E11.9 TYPE 2 DIABETES MELLITUS WITHOUT COMPLICATION, WITHOUT LONG-TERM CURRENT USE OF INSULIN (HCC): Status: ACTIVE | Noted: 2018-07-03

## 2018-07-03 PROBLEM — I47.1 PAROXYSMAL SVT (SUPRAVENTRICULAR TACHYCARDIA) (HCC): Status: ACTIVE | Noted: 2018-07-03

## 2018-07-03 PROBLEM — I10 ESSENTIAL HYPERTENSION: Status: ACTIVE | Noted: 2018-07-03

## 2018-07-03 PROBLEM — I44.7 LBBB (LEFT BUNDLE BRANCH BLOCK): Status: ACTIVE | Noted: 2018-07-03

## 2018-07-03 PROBLEM — I47.10 PAROXYSMAL SVT (SUPRAVENTRICULAR TACHYCARDIA): Status: ACTIVE | Noted: 2018-07-03

## 2018-07-03 NOTE — PROGRESS NOTES
Cardiology Outpatient Follow up Note    Abena Savage 78 y o  female MRN: [id number]    07/05/18          Assessment/Plan:    1  Possible SVT/palpitations  Palpitations have improved after starting Metoprolol 12 5 mg daily  14 day event monitor in 10/15 showed minimum HR 57 bpm, maximum HR 96 bpm, average HR 73 bpm  There were occasional PVCs and PACs, one atrial couplet  2 patient activations correlated with sinus rhythm with no ectopy  She has had several more short episodes of likely SVT, all have been short  She is currently taking 12 5 mg daily of Metoprolol, with an additional 25 mg PRN when she has an episode of palpitations, currently occurring about every two months  She feels frequency of palpitations overall unchanged  I advised her if palpitations become more frequent or episodes are lasting longer, we can reattempt event monitor to diagnose what type of arrhythmia she is having  2  DM  She is on Metformin  Hgb A1c 6 7% 1/18  3  HL  She is on Simvastatin 10 mg  Lipid panel 7/17 showed total cholesterol 153, HDL 55, , LDL 72  She reports she will be having lipid panel with other blood testing this month  4  HTN  She is on HCTZ, Quinapril, and low dose Metoprolol  BP at goal      5  LBBB  She denies any symptoms of dizziness or lightheadedness  Continue to monitor  1  Type 2 diabetes mellitus without complication, without long-term current use of insulin (HCC)     2  Paroxysmal SVT (supraventricular tachycardia) (HCC)  POCT ECG   3  Essential hypertension  POCT ECG   4  LBBB (left bundle branch block)  POCT ECG   5  Dyslipidemia         HPI: 70 y o  woman with a history of HTN, HL, DM, LBBB, and palpitations, who is here for follow up of palpitations  She was admitted to [facility] 10/15-10/16/15 for palpitations, lightheadedness, diaphoresis, and dyspnea   While in ED, she had a BM, which apparently broke her symptoms, and she did not have any further palpitations while in the hospital  She states she has had palpitations in the past, once every three weeks, sometimes they would last for a few minutes, sometimes they would last a few hours  Troponin did rise slightly to 0 16  EKG showed sinus rhythm with LBBB, which was seen previously  She had had stress test in 7/14 preoperatively that showed severe anterior wall defect  She had cardiac catheterization at [facility] 7/14 which showed normal coronary arteries, no significant obstructive CAD  Echo 10/15/15 showed LV size normal, LV function lower limits of normal with EF 55%  There was septal dyskinesis, likely due to LBBB  Grade I diastolic dysfunction  Normal RV size and function  Mild MAC with mild MR  Aortic sclerosis with mild regurgitation  It was recommended she have outpatient 2 week event monitor for further evaluation of possible SVT  She was also started on Toprol 12 5 mg daily to help prevent recurrence  14 day event monitor in 10/15 showed minimum HR 57 bpm, maximum HR 96 bpm, average HR 73 bpm  There were occasional PVCs and PACs, one atrial couplet  2 patient activations correlated with sinus rhythm with no ectopy  She had one episode of palpitations on 10/18/15, lasted for 2 5 hours, she states having a BM made it resolve  She had another episode in November 2015, and another episode beginning of January 2016, those two episodes lasted about 20 minutes  She reports some palpitations in May 2016, and another episode in July 2016, all were very short, less than 5 minutes  She says that over the last year, she has had palpitations about every other month, takes an additional Metoprolol 25 mg, and symptoms resolve quickly after that, all episodes less than half an hour  No chest pain, shortness of breath  No LE edema  She is exercising on treadmill for about 12 minutes, limited by hip pain, without exertional cardiac symptoms  No orthopnea, uses 1 pillow to sleep, no PND  No snoring   No dizziness or lightheadedness  She has lost some weight, due to her exercise and some diet changes         Patient Active Problem List   Diagnosis    Paroxysmal SVT (supraventricular tachycardia) (HCC)    Essential hypertension    Dyslipidemia    LBBB (left bundle branch block)    Type 2 diabetes mellitus without complication, without long-term current use of insulin (HCC)       No Known Allergies      Current Outpatient Prescriptions:     Aspirin 81 MG EC tablet, Take 1 tablet by mouth daily, Disp: , Rfl:     hydrochlorothiazide (HYDRODIURIL) 25 mg tablet, TAKE 1 TABLET BY MOUTH EVERY DAY, Disp: 90 tablet, Rfl: 1    levothyroxine 50 mcg tablet, Take 1 tablet (50 mcg total) by mouth daily, Disp: 90 tablet, Rfl: 1    magnesium Oxide (MAG-OX) 400 mg TABS, Take 1 tablet by mouth daily, Disp: , Rfl:     metFORMIN (GLUCOPHAGE) 850 mg tablet, TAKE 1 TABLET BY MOUTH TWICE A DAY WITH MORNING AND EVENING MEALS, Disp: 180 tablet, Rfl: 0    metoprolol succinate (TOPROL-XL) 25 mg 24 hr tablet, Take 12 5 mg by mouth daily, Disp: , Rfl: 1    Potassium 99 MG TABS, Take 1 tablet by mouth daily, Disp: , Rfl:     quinapril (ACCUPRIL) 40 MG tablet, Take 1 tablet (40 mg total) by mouth daily, Disp: 90 tablet, Rfl: 1    simvastatin (ZOCOR) 10 mg tablet, TAKE 1 TABLET BY MOUTH EVERY DAY, Disp: 90 tablet, Rfl: 1    Past Medical History:   Diagnosis Date    Hyperlipidemia     Hypertension     LBBB (left bundle branch block)     Palpitations        Family History   Problem Relation Age of Onset    Arthritis Mother     Other Mother         CABG    Cancer Mother     Heart disease Mother     Osteoporosis Mother        Past Surgical History:   Procedure Laterality Date    APPENDECTOMY       SECTION      HIP SURGERY      KNEE ARTHROSCOPY Right     therapeutic     KNEE SURGERY Left     TONSILLECTOMY         Social History     Social History    Marital status: /Civil Union     Spouse name: N/A    Number of children: N/A    Years of education: N/A     Occupational History    Not on file  Social History Main Topics    Smoking status: Former Smoker    Smokeless tobacco: Never Used    Alcohol use Yes      Comment: once a week glass of wine   Drug use: No    Sexual activity: Not on file     Other Topics Concern    Not on file     Social History Narrative    Caffeine use        Review of Systems   Constitution: Positive for weight loss  Negative for chills, decreased appetite, diaphoresis, fever, weakness, malaise/fatigue, night sweats and weight gain  HENT: Negative for ear pain, hearing loss, hoarse voice, nosebleeds, sore throat and tinnitus  Eyes: Negative for blurred vision and pain  Cardiovascular: Positive for palpitations  Negative for chest pain, claudication, cyanosis, dyspnea on exertion, irregular heartbeat, leg swelling, near-syncope, orthopnea, paroxysmal nocturnal dyspnea and syncope  Respiratory: Negative for cough, hemoptysis, shortness of breath, sleep disturbances due to breathing, snoring, sputum production and wheezing  Hematologic/Lymphatic: Negative for adenopathy and bleeding problem  Does not bruise/bleed easily  Skin: Negative for color change, dry skin, flushing, itching, poor wound healing and rash  Musculoskeletal: Positive for arthritis  Negative for back pain, falls, joint pain, muscle cramps, muscle weakness, myalgias and neck pain  Gastrointestinal: Negative for abdominal pain, constipation, diarrhea, dysphagia, heartburn, hematemesis, hematochezia, melena, nausea and vomiting  Genitourinary: Negative for dysuria, frequency, hematuria, hesitancy, non-menstrual bleeding and urgency  Neurological: Positive for numbness  Negative for excessive daytime sleepiness, dizziness, focal weakness, headaches, light-headedness, loss of balance, paresthesias, tremors and vertigo     Psychiatric/Behavioral: Negative for altered mental status, depression and memory loss  The patient does not have insomnia and is not nervous/anxious  Allergic/Immunologic: Negative for environmental allergies and persistent infections  Vitals: /58 (BP Location: Left arm, Patient Position: Sitting, Cuff Size: Adult)   Pulse 63   Ht 5' 4" (1 626 m)   Wt 108 kg (237 lb 1 6 oz)   SpO2 97%   BMI 40 70 kg/m²       Physical Exam:     GEN: Alert and oriented x 3, in no acute distress  Well appearing and well nourished  HEENT: Sclera anicteric, conjunctivae pink, mucous membranes moist  Oropharynx clear  NECK: Supple, no carotid bruits, no significant JVD  Trachea midline, no thyromegaly  HEART: Regular rhythm, normal S1 and S2, no murmurs, clicks, gallops or rubs  PMI nondisplaced, no thrills  LUNGS: Clear to auscultation bilaterally; no wheezes, rales, or rhonchi  No increased work of breathing or signs of respiratory distress  ABDOMEN: Soft, nontender, nondistended, normoactive bowel sounds  EXTREMITIES: Skin warm and well perfused, no clubbing, cyanosis, or edema  NEURO: No focal findings  Normal gait  Normal speech  Mood and affect normal    SKIN: Normal without suspicious lesions on exposed skin        Lab Results:       Lab Results   Component Value Date    HGBA1C 6 7 (H) 01/10/2018    HGBA1C 7 2 (H) 07/10/2017    HGBA1C 7 1 (H) 12/28/2016     Lab Results   Component Value Date    CHOL 153 07/10/2017    CHOL 151 06/20/2016    CHOL 158 05/20/2014     Lab Results   Component Value Date    HDL 55 07/10/2017    HDL 54 06/20/2016    HDL 54 05/20/2014     No results found for: [address]  Lab Results   Component Value Date    TRIG 132 07/10/2017    TRIG 121 06/20/2016    TRIG 135 05/20/2014     No components found for: CHOLHDL

## 2018-07-05 ENCOUNTER — OFFICE VISIT (OUTPATIENT)
Dept: CARDIOLOGY CLINIC | Facility: MEDICAL CENTER | Age: 78
End: 2018-07-05
Payer: MEDICARE

## 2018-07-05 VITALS
WEIGHT: 237.1 LBS | BODY MASS INDEX: 40.48 KG/M2 | OXYGEN SATURATION: 97 % | SYSTOLIC BLOOD PRESSURE: 118 MMHG | HEIGHT: 64 IN | DIASTOLIC BLOOD PRESSURE: 58 MMHG | HEART RATE: 63 BPM

## 2018-07-05 DIAGNOSIS — E11.9 TYPE 2 DIABETES MELLITUS WITHOUT COMPLICATION, WITHOUT LONG-TERM CURRENT USE OF INSULIN (HCC): Primary | ICD-10-CM

## 2018-07-05 DIAGNOSIS — E78.5 DYSLIPIDEMIA: ICD-10-CM

## 2018-07-05 DIAGNOSIS — I47.1 PAROXYSMAL SVT (SUPRAVENTRICULAR TACHYCARDIA) (HCC): ICD-10-CM

## 2018-07-05 DIAGNOSIS — I10 ESSENTIAL HYPERTENSION: ICD-10-CM

## 2018-07-05 DIAGNOSIS — I44.7 LBBB (LEFT BUNDLE BRANCH BLOCK): ICD-10-CM

## 2018-07-05 PROCEDURE — 99214 OFFICE O/P EST MOD 30 MIN: CPT | Performed by: INTERNAL MEDICINE

## 2018-07-05 PROCEDURE — 93000 ELECTROCARDIOGRAM COMPLETE: CPT | Performed by: INTERNAL MEDICINE

## 2018-07-05 RX ORDER — METOPROLOL SUCCINATE 25 MG/1
12.5 TABLET, EXTENDED RELEASE ORAL DAILY
Refills: 1 | COMMUNITY
Start: 2018-05-05 | End: 2018-07-24 | Stop reason: SDUPTHER

## 2018-07-05 RX ORDER — LANOLIN ALCOHOL/MO/W.PET/CERES
1 CREAM (GRAM) TOPICAL DAILY
COMMUNITY

## 2018-07-09 DIAGNOSIS — I10 ESSENTIAL (PRIMARY) HYPERTENSION: ICD-10-CM

## 2018-07-09 DIAGNOSIS — E78.5 HYPERLIPIDEMIA: ICD-10-CM

## 2018-07-09 DIAGNOSIS — E07.9 DISORDER OF THYROID: ICD-10-CM

## 2018-07-09 DIAGNOSIS — E11.9 TYPE 2 DIABETES MELLITUS WITHOUT COMPLICATIONS (HCC): ICD-10-CM

## 2018-07-13 LAB
ALBUMIN/CREAT UR: 9 MCG/MG CREAT
BUN SERPL-MCNC: 17 MG/DL (ref 7–25)
BUN/CREAT SERPL: 17 (CALC) (ref 6–22)
CALCIUM SERPL-MCNC: 9.6 MG/DL (ref 8.6–10.4)
CHLORIDE SERPL-SCNC: 103 MMOL/L (ref 98–110)
CHOLEST SERPL-MCNC: 157 MG/DL
CHOLEST/HDLC SERPL: 3 (CALC)
CO2 SERPL-SCNC: 31 MMOL/L (ref 20–31)
CREAT SERPL-MCNC: 0.98 MG/DL (ref 0.6–0.93)
CREAT UR-MCNC: 123 MG/DL (ref 20–320)
GLUCOSE SERPL-MCNC: 128 MG/DL (ref 65–99)
HBA1C MFR BLD: 6.7 % OF TOTAL HGB
HDLC SERPL-MCNC: 52 MG/DL
LDLC SERPL CALC-MCNC: 83 MG/DL (CALC)
MICROALBUMIN UR-MCNC: 1.1 MG/DL
NONHDLC SERPL-MCNC: 105 MG/DL (CALC)
POTASSIUM SERPL-SCNC: 4.5 MMOL/L (ref 3.5–5.3)
SL AMB EGFR AFRICAN AMERICAN: 65 ML/MIN/1.73M2
SL AMB EGFR NON AFRICAN AMERICAN: 56 ML/MIN/1.73M2
SODIUM SERPL-SCNC: 142 MMOL/L (ref 135–146)
TRIGL SERPL-MCNC: 127 MG/DL
TSH SERPL-ACNC: 0.64 MIU/L (ref 0.4–4.5)

## 2018-07-24 DIAGNOSIS — I10 HYPERTENSION, UNSPECIFIED TYPE: Primary | ICD-10-CM

## 2018-07-24 RX ORDER — METOPROLOL SUCCINATE 25 MG/1
12.5 TABLET, EXTENDED RELEASE ORAL DAILY
Qty: 45 TABLET | Refills: 3 | Status: SHIPPED | OUTPATIENT
Start: 2018-07-24 | End: 2018-07-31 | Stop reason: SDUPTHER

## 2018-07-27 ENCOUNTER — TELEPHONE (OUTPATIENT)
Dept: CARDIOLOGY CLINIC | Facility: MEDICAL CENTER | Age: 78
End: 2018-07-27

## 2018-07-27 ENCOUNTER — OFFICE VISIT (OUTPATIENT)
Dept: FAMILY MEDICINE CLINIC | Facility: MEDICAL CENTER | Age: 78
End: 2018-07-27
Payer: MEDICARE

## 2018-07-27 VITALS
RESPIRATION RATE: 16 BRPM | DIASTOLIC BLOOD PRESSURE: 60 MMHG | SYSTOLIC BLOOD PRESSURE: 118 MMHG | WEIGHT: 238.6 LBS | BODY MASS INDEX: 40.96 KG/M2 | HEART RATE: 76 BPM

## 2018-07-27 DIAGNOSIS — Z12.11 SCREENING FOR MALIGNANT NEOPLASM OF COLON: ICD-10-CM

## 2018-07-27 DIAGNOSIS — I10 ESSENTIAL HYPERTENSION: ICD-10-CM

## 2018-07-27 DIAGNOSIS — E11.9 TYPE 2 DIABETES MELLITUS WITHOUT COMPLICATION, WITHOUT LONG-TERM CURRENT USE OF INSULIN (HCC): Primary | ICD-10-CM

## 2018-07-27 DIAGNOSIS — Z00.00 PREVENTATIVE HEALTH CARE: ICD-10-CM

## 2018-07-27 DIAGNOSIS — I47.1 PAROXYSMAL SVT (SUPRAVENTRICULAR TACHYCARDIA) (HCC): ICD-10-CM

## 2018-07-27 DIAGNOSIS — E78.5 DYSLIPIDEMIA: ICD-10-CM

## 2018-07-27 PROCEDURE — G0439 PPPS, SUBSEQ VISIT: HCPCS | Performed by: FAMILY MEDICINE

## 2018-07-27 PROCEDURE — 99214 OFFICE O/P EST MOD 30 MIN: CPT | Performed by: FAMILY MEDICINE

## 2018-07-27 NOTE — PROGRESS NOTES
Patient lives at home independently  Patient has no concerns about the status of his health at this time  Patient lives with spouse  Has an active social life and is not socially isolated  There have been no behavioral problems  Patient is completely independent  Able to dress, bathe, ambulate, feed self etc  Patient is able to drive an automobile  There are no limitations with the patient shopping, housekeeping, yard maintenance etc     Patient's home is well lit and  uncluttered  Night lights are used at night  Grab bars are available in the bathroom  Patient's other healthcare providers, if any, are listed in the appropriate section of this chart  Patient's vital signs and BMI were reviewed and available on this chart  Screening for depression, urinary incontinence and fall risk were performed today and those results are available in the screening section of this chart  The patient is completely oriented alert and conversant  Appropriate thought and affect  The patient's "timed up and go test" is normal (easily under 12 seconds)    Patient declines mammography  She does get annual fit testing  She would rather not do the colonoscopy  She does get annual flu shots  She has had both pneumococcal vaccines  She is planning to go to the pharmacy and begin the shingles vaccine

## 2018-07-27 NOTE — ASSESSMENT & PLAN NOTE
Blood pressure is under good control  She takes hydrochlorothiazide  She also takes quinapril  There is a prescription for metoprolol but that is to control her SVT  She has no complaints regarding the medication      Continue these medications continue routine rechecks

## 2018-07-27 NOTE — PROGRESS NOTES
Assessment/Plan:    Type 2 diabetes mellitus without complication, without long-term current use of insulin ([facility] [address] )  Diabetes is under good control  Her last A1c was 6 7  Her only medication for diabetes is metformin  She does follow a diabetic diet and tries to get regular exercise  Continue lifestyle changes and continue metformin  Recheck A1c in six months  Essential hypertension  Blood pressure is under good control  She takes hydrochlorothiazide  She also takes quinapril  There is a prescription for metoprolol but that is to control her SVT  She has no complaints regarding the medication  Continue these medications continue routine rechecks    Dyslipidemia  Last LDL was 92 and well under the goal of 100  She takes simvastatin 10 mg and follows a low-fat diet  Continue low-fat diet, continue simvastatin  Paroxysmal SVT (supraventricular tachycardia) (HCC)  Her SVT is managed with the metoprolol 25 mg  She gets some breakthrough is but they are rare and short-lived and do not create other symptoms for her  Continue metoprolol 25 mg  Diagnoses and all orders for this visit:    Type 2 diabetes mellitus without complication, without long-term current use of insulin (HCC)  -     Basic metabolic panel; Future  -     Hemoglobin A1C; Future    Essential hypertension  -     Basic metabolic panel; Future    Dyslipidemia    Screening for malignant neoplasm of colon  -     Occult Bloood,Fecal Immunochemical; Future    Paroxysmal SVT (supraventricular tachycardia) [facility])    Preventative health care          Subjective:      Patient ID: Abena Hawthorne is a 70 y o  female  Here for routine follow up of medical problems  Please see assesment and plan for discussion             The following portions of the patient's history were reviewed and updated as appropriate: allergies, current medications, past family history, past medical history, past social history, past surgical history and problem list     Review of Systems   Constitutional: Negative for activity change, appetite change, fatigue and fever  HENT: Negative for congestion, ear pain, hearing loss, nosebleeds, postnasal drip, rhinorrhea, sinus pain, sore throat and trouble swallowing  Eyes: Negative for photophobia, pain, redness and visual disturbance  Respiratory: Negative for cough, chest tightness, shortness of breath and wheezing  Cardiovascular: Negative for chest pain and palpitations  Gastrointestinal: Negative for abdominal pain, blood in stool, constipation, diarrhea, nausea and vomiting  Endocrine: Negative for cold intolerance, heat intolerance, polydipsia, polyphagia and polyuria  Genitourinary: Negative for difficulty urinating, dyspareunia, dysuria, flank pain, frequency, menstrual problem, pelvic pain, urgency, vaginal bleeding and vaginal discharge  Musculoskeletal: Negative for arthralgias, gait problem, joint swelling and myalgias  Skin: Negative for rash and wound  Neurological: Negative for dizziness, tremors, seizures, syncope, speech difficulty, weakness, light-headedness and headaches  Psychiatric/Behavioral: Negative for agitation, decreased concentration, dysphoric mood, sleep disturbance and suicidal ideas  The patient is not nervous/anxious  Objective:      /60 (BP Location: Left arm, Patient Position: Sitting, Cuff Size: Large)   Pulse 76   Resp 16   Wt 108 kg (238 lb 9 6 oz)   BMI 40 96 kg/m²          Physical Exam   Constitutional: She is oriented to person, place, and time  Vital signs are normal  She appears well-developed and well-nourished  She is cooperative  HENT:   Head: Normocephalic  Right Ear: Hearing, external ear and ear canal normal    Left Ear: Hearing, tympanic membrane, external ear and ear canal normal    Nose: Nose normal  No mucosal edema or rhinorrhea     Mouth/Throat: Uvula is midline, oropharynx is clear and moist and mucous membranes are normal  No oropharyngeal exudate  Eyes: Conjunctivae, EOM and lids are normal  Pupils are equal, round, and reactive to light  Neck: Carotid bruit is not present  No thyroid mass and no thyromegaly present  Cardiovascular: Normal rate, regular rhythm, S1 normal, S2 normal, normal heart sounds and normal pulses  Exam reveals no gallop  No murmur heard  Pulmonary/Chest: Effort normal and breath sounds normal  She has no wheezes  She has no rales  Abdominal: Soft  Normal appearance, normal aorta and bowel sounds are normal  There is no hepatosplenomegaly  There is no tenderness  There is no CVA tenderness  No hernia  Musculoskeletal: Normal range of motion  Lymphadenopathy:     She has no cervical adenopathy  Neurological: She is oriented to person, place, and time  She has normal strength  No cranial nerve deficit or sensory deficit  Coordination normal    Skin: Skin is warm, dry and intact  No rash noted  Psychiatric: She has a normal mood and affect  Her speech is normal and behavior is normal  Judgment and thought content normal  Cognition and memory are normal    Nursing note and vitals reviewed

## 2018-07-27 NOTE — ASSESSMENT & PLAN NOTE
Her SVT is managed with the metoprolol 25 mg  She gets some breakthrough is but they are rare and short-lived and do not create other symptoms for her  Continue metoprolol 25 mg

## 2018-07-27 NOTE — ASSESSMENT & PLAN NOTE
Diabetes is under good control  Her last A1c was 6 7  Her only medication for diabetes is metformin  She does follow a diabetic diet and tries to get regular exercise  Continue lifestyle changes and continue metformin  Recheck A1c in six months

## 2018-07-27 NOTE — ASSESSMENT & PLAN NOTE
Last LDL was 92 and well under the goal of 100  She takes simvastatin 10 mg and follows a low-fat diet  Continue low-fat diet, continue simvastatin

## 2018-07-31 DIAGNOSIS — I10 HYPERTENSION, UNSPECIFIED TYPE: ICD-10-CM

## 2018-07-31 RX ORDER — METOPROLOL SUCCINATE 25 MG/1
TABLET, EXTENDED RELEASE ORAL
Qty: 45 TABLET | Refills: 3 | Status: SHIPPED | OUTPATIENT
Start: 2018-07-31 | End: 2018-10-11 | Stop reason: SDUPTHER

## 2018-08-02 DIAGNOSIS — E11.9 TYPE 2 DIABETES MELLITUS WITHOUT COMPLICATION, WITHOUT LONG-TERM CURRENT USE OF INSULIN (HCC): ICD-10-CM

## 2018-09-12 DIAGNOSIS — E03.9 HYPOTHYROIDISM, UNSPECIFIED TYPE: ICD-10-CM

## 2018-09-12 RX ORDER — LEVOTHYROXINE SODIUM 0.05 MG/1
50 TABLET ORAL DAILY
Qty: 90 TABLET | Refills: 1 | Status: SHIPPED | OUTPATIENT
Start: 2018-09-12 | End: 2019-01-28 | Stop reason: SDUPTHER

## 2018-09-13 DIAGNOSIS — E03.9 HYPOTHYROIDISM, UNSPECIFIED TYPE: ICD-10-CM

## 2018-09-13 RX ORDER — LEVOTHYROXINE SODIUM 0.05 MG/1
TABLET ORAL
Qty: 90 TABLET | Refills: 1 | Status: SHIPPED | OUTPATIENT
Start: 2018-09-13 | End: 2019-08-22 | Stop reason: SDUPTHER

## 2018-10-11 DIAGNOSIS — I10 HYPERTENSION, UNSPECIFIED TYPE: ICD-10-CM

## 2018-10-11 RX ORDER — METOPROLOL SUCCINATE 25 MG/1
12.5 TABLET, EXTENDED RELEASE ORAL DAILY
Qty: 45 TABLET | Refills: 3 | Status: SHIPPED | OUTPATIENT
Start: 2018-10-11 | End: 2019-03-18 | Stop reason: SDUPTHER

## 2018-11-05 DIAGNOSIS — E78.2 MIXED HYPERLIPIDEMIA: ICD-10-CM

## 2018-11-05 RX ORDER — SIMVASTATIN 10 MG
TABLET ORAL
Qty: 90 TABLET | Refills: 1 | Status: SHIPPED | OUTPATIENT
Start: 2018-11-05 | End: 2019-04-24 | Stop reason: SDUPTHER

## 2018-11-12 DIAGNOSIS — I10 HYPERTENSION, UNSPECIFIED TYPE: ICD-10-CM

## 2018-11-13 RX ORDER — QUINAPRIL 40 MG/1
TABLET ORAL
Qty: 90 TABLET | Refills: 1 | Status: SHIPPED | OUTPATIENT
Start: 2018-11-13 | End: 2019-01-28 | Stop reason: SDUPTHER

## 2019-01-16 LAB
BUN SERPL-MCNC: 19 MG/DL (ref 7–25)
BUN/CREAT SERPL: 20 (CALC) (ref 6–22)
CALCIUM SERPL-MCNC: 9.6 MG/DL (ref 8.6–10.4)
CHLORIDE SERPL-SCNC: 103 MMOL/L (ref 98–110)
CO2 SERPL-SCNC: 31 MMOL/L (ref 20–32)
CREAT SERPL-MCNC: 0.97 MG/DL (ref 0.6–0.93)
GLUCOSE SERPL-MCNC: 129 MG/DL (ref 65–99)
HBA1C MFR BLD: 6.6 % OF TOTAL HGB
POTASSIUM SERPL-SCNC: 4.4 MMOL/L (ref 3.5–5.3)
SL AMB EGFR AFRICAN AMERICAN: 66 ML/MIN/1.73M2
SL AMB EGFR NON AFRICAN AMERICAN: 57 ML/MIN/1.73M2
SODIUM SERPL-SCNC: 143 MMOL/L (ref 135–146)

## 2019-01-21 PROBLEM — E66.01 MORBID OBESITY WITH BMI OF 40.0-44.9, ADULT (HCC): Status: ACTIVE | Noted: 2019-01-21

## 2019-01-21 PROBLEM — E11.3293 TYPE 2 DIABETES MELLITUS WITH MILD NONPROLIFERATIVE RETINOPATHY OF BOTH EYES WITHOUT MACULAR EDEMA (HCC): Status: ACTIVE | Noted: 2019-01-21

## 2019-01-28 ENCOUNTER — OFFICE VISIT (OUTPATIENT)
Dept: FAMILY MEDICINE CLINIC | Facility: MEDICAL CENTER | Age: 79
End: 2019-01-28
Payer: MEDICARE

## 2019-01-28 VITALS
SYSTOLIC BLOOD PRESSURE: 100 MMHG | RESPIRATION RATE: 16 BRPM | WEIGHT: 224.2 LBS | BODY MASS INDEX: 38.48 KG/M2 | HEART RATE: 88 BPM | DIASTOLIC BLOOD PRESSURE: 70 MMHG

## 2019-01-28 DIAGNOSIS — I10 ESSENTIAL HYPERTENSION: ICD-10-CM

## 2019-01-28 DIAGNOSIS — I47.1 PAROXYSMAL SVT (SUPRAVENTRICULAR TACHYCARDIA) (HCC): ICD-10-CM

## 2019-01-28 DIAGNOSIS — I10 HYPERTENSION, UNSPECIFIED TYPE: ICD-10-CM

## 2019-01-28 DIAGNOSIS — E11.3293 TYPE 2 DIABETES MELLITUS WITH BOTH EYES AFFECTED BY MILD NONPROLIFERATIVE RETINOPATHY WITHOUT MACULAR EDEMA, WITHOUT LONG-TERM CURRENT USE OF INSULIN (HCC): ICD-10-CM

## 2019-01-28 DIAGNOSIS — E03.9 ACQUIRED HYPOTHYROIDISM: Primary | ICD-10-CM

## 2019-01-28 DIAGNOSIS — Z12.11 SCREENING FOR MALIGNANT NEOPLASM OF COLON: ICD-10-CM

## 2019-01-28 DIAGNOSIS — E78.5 DYSLIPIDEMIA: ICD-10-CM

## 2019-01-28 PROCEDURE — 99215 OFFICE O/P EST HI 40 MIN: CPT | Performed by: FAMILY MEDICINE

## 2019-01-28 RX ORDER — QUINAPRIL 20 MG/1
40 TABLET ORAL DAILY
Qty: 90 TABLET | Refills: 1 | Status: SHIPPED | OUTPATIENT
Start: 2019-01-28 | End: 2019-04-08 | Stop reason: SDUPTHER

## 2019-01-29 NOTE — ASSESSMENT & PLAN NOTE
Patient's thyroid is well controlled  Her TSH is at goal   She takes levothyroxine 50 mcg  Continue levothyroxine, continue periodically TSH  Recheck at next visit

## 2019-01-29 NOTE — ASSESSMENT & PLAN NOTE
Patient takes Accupril, hydrochlorothiazide and metoprolol  If anything her blood pressure is running low  She is occasionally symptomatic  Will decrease Accupril to 20 mg  Continue hydrochlorothiazide 25 mg and continue the metoprolol as she has been taking for her palpitations

## 2019-01-29 NOTE — ASSESSMENT & PLAN NOTE
Patient's A1c is at goal   She is doing well with diet and only metformin    Continue metformin, continue diabetic diet, continue to try and get plenty of exercise

## 2019-01-29 NOTE — ASSESSMENT & PLAN NOTE
Patient takes metoprolol 25 mg  She does find that if she takes the full 25 mg does she feels lightheaded  She has been taking half of a 25 mg and it is effective most of the time  When she has breakthrough palpitations, she will take another 12 5 mg  He will be following up with Cardiology this summer  Continue metoprolol  If symptoms become very frequent or if she is not tolerating the metoprolol she should contact Cardiology

## 2019-01-29 NOTE — PROGRESS NOTES
Assessment/Plan:    Type 2 diabetes mellitus without complication, without long-term current use of insulin ([facility])  Patient's A1c is at goal   She is doing well with diet and only metformin    Continue metformin, continue diabetic diet, continue to try and get plenty of exercise  Acquired hypothyroidism  Patient's thyroid is well controlled  Her TSH is at goal   She takes levothyroxine 50 mcg  Continue levothyroxine, continue periodically TSH  Recheck at next visit  Paroxysmal SVT (supraventricular tachycardia) ([address] )  Patient takes metoprolol 25 mg  She does find that if she takes the full 25 mg does she feels lightheaded  She has been taking half of a 25 mg and it is effective most of the time  When she has breakthrough palpitations, she will take another 12 5 mg  He will be following up with Cardiology this summer  Continue metoprolol  If symptoms become very frequent or if she is not tolerating the metoprolol she should contact Cardiology  Essential hypertension  Patient takes Accupril, hydrochlorothiazide and metoprolol  If anything her blood pressure is running low  She is occasionally symptomatic  Will decrease Accupril to 20 mg  Continue hydrochlorothiazide 25 mg and continue the metoprolol as she has been taking for her palpitations  Diagnoses and all orders for this visit:    Acquired hypothyroidism  -     TSH, 3rd generation with Free T4 reflex; Future    Hypertension, unspecified type  -     quinapril (ACCUPRIL) 20 mg tablet; Take 2 tablets (40 mg total) by mouth daily  -     Comprehensive metabolic panel; Future    Type 2 diabetes mellitus with both eyes affected by mild nonproliferative retinopathy without macular edema, without long-term current use of insulin ([facility])  -     Comprehensive metabolic panel;  Future  -     Hemoglobin A1C; Future    Paroxysmal SVT (supraventricular tachycardia) ([facility])    Essential hypertension  -     Comprehensive metabolic panel; Future    Dyslipidemia  -     Lipid Panel with Direct LDL reflex; Future    Screening for malignant neoplasm of colon  -     Occult Blood, Fecal Immunochemical; Future    Other orders  -     Multiple Vitamins-Minerals (CENTRUM SILVER 50+WOMEN PO); Take by mouth          Subjective:      Patient ID: Abena Garcia is a 70 y o  female  Here for routine follow up of medical problems  Please see assesment and plan for discussion  The following portions of the patient's history were reviewed and updated as appropriate: allergies, current medications, past family history, past medical history, past social history, past surgical history and problem list     Review of Systems   Constitutional: Negative for activity change, appetite change, fatigue and fever  HENT: Positive for sore throat  Negative for congestion, ear pain, hearing loss, nosebleeds, postnasal drip, rhinorrhea, sinus pain and trouble swallowing  Eyes: Negative for photophobia, pain, redness and visual disturbance  Respiratory: Negative for cough, chest tightness, shortness of breath and wheezing  Cardiovascular: Negative for chest pain and palpitations  Gastrointestinal: Negative for abdominal pain, blood in stool, constipation, diarrhea, nausea and vomiting  Endocrine: Negative for cold intolerance, heat intolerance, polydipsia, polyphagia and polyuria  Genitourinary: Negative for difficulty urinating, dyspareunia, dysuria, flank pain, frequency, menstrual problem, pelvic pain, urgency, vaginal bleeding and vaginal discharge  Musculoskeletal: Negative for arthralgias, gait problem, joint swelling and myalgias  Skin: Negative for rash and wound  Neurological: Negative for dizziness, tremors, seizures, syncope, speech difficulty, weakness, light-headedness and headaches  Psychiatric/Behavioral: Negative for agitation, decreased concentration, dysphoric mood, sleep disturbance and suicidal ideas   The patient is not nervous/anxious  Objective:      /70 (Cuff Size: Large)   Pulse 88   Resp 16   Wt 102 kg (224 lb 3 2 oz)   BMI 38 48 kg/m²          Physical Exam   Constitutional: She is oriented to person, place, and time  Vital signs are normal  She appears well-developed and well-nourished  She is cooperative  HENT:   Head: Normocephalic  Right Ear: Hearing, external ear and ear canal normal    Left Ear: Hearing, tympanic membrane, external ear and ear canal normal    Nose: Nose normal  No mucosal edema or rhinorrhea  Mouth/Throat: Uvula is midline, oropharynx is clear and moist and mucous membranes are normal  No oropharyngeal exudate  Eyes: Pupils are equal, round, and reactive to light  Conjunctivae, EOM and lids are normal    Neck: Carotid bruit is not present  No thyroid mass and no thyromegaly present  Cardiovascular: Normal rate, regular rhythm, S1 normal, S2 normal, normal heart sounds and normal pulses  Exam reveals no gallop  No murmur heard  Pulmonary/Chest: Effort normal and breath sounds normal  She has no wheezes  She has no rales  Abdominal: Soft  Normal appearance, normal aorta and bowel sounds are normal  There is no hepatosplenomegaly  There is no tenderness  There is no CVA tenderness  No hernia  Musculoskeletal: Normal range of motion  Lymphadenopathy:     She has no cervical adenopathy  Neurological: She is oriented to person, place, and time  She has normal strength  No cranial nerve deficit or sensory deficit  Coordination normal    Skin: Skin is warm, dry and intact  No rash noted  Psychiatric: She has a normal mood and affect  Her speech is normal and behavior is normal  Judgment and thought content normal  Cognition and memory are normal    Nursing note and vitals reviewed

## 2019-01-29 NOTE — ASSESSMENT & PLAN NOTE
Last LDL was 83  She is taking simvastatin 10 mg  Continue simvastatin  Continue low-fat diet  Recheck lipid profile this summer

## 2019-02-01 DIAGNOSIS — E11.9 TYPE 2 DIABETES MELLITUS WITHOUT COMPLICATION, WITHOUT LONG-TERM CURRENT USE OF INSULIN (HCC): ICD-10-CM

## 2019-03-18 DIAGNOSIS — I10 HYPERTENSION, UNSPECIFIED TYPE: ICD-10-CM

## 2019-03-18 RX ORDER — METOPROLOL SUCCINATE 25 MG/1
25 TABLET, EXTENDED RELEASE ORAL DAILY
Qty: 90 TABLET | Refills: 3 | Status: SHIPPED | OUTPATIENT
Start: 2019-03-18 | End: 2019-09-18 | Stop reason: HOSPADM

## 2019-04-04 DIAGNOSIS — I10 ESSENTIAL HYPERTENSION: ICD-10-CM

## 2019-04-04 RX ORDER — HYDROCHLOROTHIAZIDE 25 MG/1
TABLET ORAL
Qty: 90 TABLET | Refills: 1 | Status: SHIPPED | OUTPATIENT
Start: 2019-04-04 | End: 2019-09-26 | Stop reason: SDUPTHER

## 2019-04-08 DIAGNOSIS — I10 HYPERTENSION, UNSPECIFIED TYPE: ICD-10-CM

## 2019-04-09 RX ORDER — QUINAPRIL 20 MG/1
20 TABLET ORAL DAILY
Qty: 90 TABLET | Refills: 1 | Status: SHIPPED | OUTPATIENT
Start: 2019-04-09 | End: 2019-10-14 | Stop reason: SDUPTHER

## 2019-04-21 DIAGNOSIS — I10 HYPERTENSION, UNSPECIFIED TYPE: ICD-10-CM

## 2019-04-22 RX ORDER — QUINAPRIL 20 MG/1
TABLET ORAL
Qty: 90 TABLET | Refills: 1 | OUTPATIENT
Start: 2019-04-22

## 2019-04-24 DIAGNOSIS — E78.2 MIXED HYPERLIPIDEMIA: ICD-10-CM

## 2019-04-24 RX ORDER — SIMVASTATIN 10 MG
TABLET ORAL
Qty: 90 TABLET | Refills: 1 | Status: SHIPPED | OUTPATIENT
Start: 2019-04-24 | End: 2019-10-12 | Stop reason: SDUPTHER

## 2019-04-26 DIAGNOSIS — E11.9 TYPE 2 DIABETES MELLITUS WITHOUT COMPLICATION, WITHOUT LONG-TERM CURRENT USE OF INSULIN (HCC): ICD-10-CM

## 2019-07-02 NOTE — PROGRESS NOTES
Cardiology Outpatient Follow up Note    April Janette 79 y o  female MRN: [id number]    07/03/19          Assessment/Plan:    1  Possible SVT/palpitations  Palpitations have improved after starting Metoprolol 12 5 mg daily  14 day event monitor in 10/15 showed minimum HR 57 bpm, maximum HR 96 bpm, average HR 73 bpm  There were occasional PVCs and PACs, one atrial couplet  2 patient activations correlated with sinus rhythm with no ectopy  She has had several more short episodes of likely SVT, all have been short  She is currently taking 12 5 mg daily of Metoprolol, with an additional 25 mg PRN when she has an episode of palpitations, currently occurring about every two weeks based on her log of symptoms, which is an increase from last year when she was getting every 2 months  Would recommend repeat event monitor to diagnose what type of arrhythmia she is having, she is agreeable, will order for 30 day event monitor so we can catch at least one episode of SVT  2  DM  She is on Metformin  Hgb A1c 6 7% 1/18  Hgb A1c 1/19 6 6%  3  HL  She is on Simvastatin 10 mg  Lipid panel 7/17 showed total cholesterol 153, HDL 55, , LDL 72  Lipid panel 7/18 showed total cholesterol 157, , HDL 52, LDL 83  Had repeat labs earlier this morning  4  HTN  She is on HCTZ, Quinapril, and low dose Metoprolol  BP at goal      5  LBBB  She denies any symptoms of dizziness or lightheadedness  Continue to monitor  1  Type 2 diabetes mellitus with both eyes affected by mild nonproliferative retinopathy without macular edema, without long-term current use of insulin ([address] )     2  LBBB (left bundle branch block)  POCT ECG   3  Paroxysmal SVT (supraventricular tachycardia) (HCC)  POCT ECG    Cardiac event monitor   4  Essential hypertension  POCT ECG   5  Dyslipidemia         HPI: 70 y o  woman with a history of HTN, HL, DM, LBBB, and palpitations, who is here for follow up of palpitations       She was admitted to [facility] 10/15-10/16/15 for palpitations, lightheadedness, diaphoresis, and dyspnea  While in ED, she had a BM, which apparently broke her symptoms, and she did not have any further palpitations while in the hospital  She states she has had palpitations in the past, once every three weeks, sometimes they would last for a few minutes, sometimes they would last a few hours  Troponin did rise slightly to 0 16  EKG showed sinus rhythm with LBBB, which was seen previously  She had had stress test in 7/14 preoperatively that showed severe anterior wall defect  She had cardiac catheterization at [facility] 7/14 which showed normal coronary arteries, no significant obstructive CAD  Echo 10/15/15 showed LV size normal, LV function lower limits of normal with EF 55%  There was septal dyskinesis, likely due to LBBB  Grade I diastolic dysfunction  Normal RV size and function  Mild MAC with mild MR  Aortic sclerosis with mild regurgitation  It was recommended she have outpatient 2 week event monitor for further evaluation of possible SVT  She was also started on Toprol 12 5 mg daily to help prevent recurrence  14 day event monitor in 10/15 showed minimum HR 57 bpm, maximum HR 96 bpm, average HR 73 bpm  There were occasional PVCs and PACs, one atrial couplet  2 patient activations correlated with sinus rhythm with no ectopy  She had one episode of palpitations on 10/18/15, lasted for 2 5 hours, she states having a BM made it resolve  She had another episode in November 2015, and another episode beginning of January 2016, those two episodes lasted about 20 minutes  She reports some palpitations in May 2016, and another episode in July 2016, all were very short, less than 5 minutes  She says that over the last year, she has had palpitations on average about every 2 weeks, she takes additional Metoprolol 25 mg as soon as it starts  Generally resolves once she has a BM  Longest lasted about 20 minutes  No chest pain, shortness of breath  No LE edema  She is exercising by walking in mall, limited by hip pain, without exertional cardiac symptoms  No orthopnea, uses 1 pillow to sleep, no PND  No snoring  No dizziness or lightheadedness        Patient Active Problem List   Diagnosis    Paroxysmal SVT (supraventricular tachycardia) (HCC)    Essential hypertension    Dyslipidemia    LBBB (left bundle branch block)    Type 2 diabetes mellitus without complication, without long-term current use of insulin (HCC)    Type 2 diabetes mellitus with mild nonproliferative retinopathy of both eyes without macular edema ([facility])    Morbid obesity with BMI of 40 0-44 9, adult ([facility] )    Acquired hypothyroidism       No Known Allergies      Current Outpatient Medications:     Aspirin 81 MG EC tablet, Take 1 tablet by mouth daily, Disp: , Rfl:     Cyanocobalamin (B-12 PO), Take 1 capsule by mouth daily, Disp: , Rfl:     hydrochlorothiazide (HYDRODIURIL) 25 mg tablet, TAKE 1 TABLET BY MOUTH EVERY DAY, Disp: 90 tablet, Rfl: 1    levothyroxine 50 mcg tablet, TAKE 1 TABLET BY MOUTH DAILY, Disp: 90 tablet, Rfl: 1    magnesium Oxide (MAG-OX) 400 mg TABS, Take 1 tablet by mouth daily, Disp: , Rfl:     metFORMIN (GLUCOPHAGE) 850 mg tablet, TAKE 1 TABLET BY MOUTH TWICE A DAY WITH MORNING AND EVENING MEALS, Disp: 180 tablet, Rfl: 1    metoprolol succinate (TOPROL-XL) 25 mg 24 hr tablet, Take 1 tablet (25 mg total) by mouth daily, Disp: 90 tablet, Rfl: 3    Multiple Vitamins-Minerals (CENTRUM SILVER 50+WOMEN PO), Take 1 tablet by mouth daily , Disp: , Rfl:     Potassium 99 MG TABS, Take 1 tablet by mouth daily, Disp: , Rfl:     quinapril (ACCUPRIL) 20 mg tablet, Take 1 tablet (20 mg total) by mouth daily, Disp: 90 tablet, Rfl: 1    simvastatin (ZOCOR) 10 mg tablet, TAKE 1 TABLET BY MOUTH EVERY DAY, Disp: 90 tablet, Rfl: 1    VITAMIN E BLEND PO, Take 1 capsule by mouth daily, Disp: , Rfl:     Past Medical History:   Diagnosis Date  Diabetes mellitus ([facility] )     Disease of thyroid gland     Hyperlipidemia     Hypertension     LBBB (left bundle branch block)     Palpitations        Family History   Problem Relation Age of Onset    Arthritis Mother     Other Mother         CABG    Cancer Mother     Heart disease Mother     Osteoporosis Mother     Lung cancer Father     Pancreatic cancer Father        Past Surgical History:   Procedure Laterality Date    APPENDECTOMY       SECTION      HERNIA REPAIR      HIP SURGERY      KNEE ARTHROSCOPY Right     therapeutic     KNEE SURGERY Left     TONSILLECTOMY         Social History     Socioeconomic History    Marital status: /Civil Union     Spouse name: Not on file    Number of children: Not on file    Years of education: Not on file    Highest education level: Not on file   Occupational History    Not on file   Social Needs    Financial resource strain: Not on file    Food insecurity:     Worry: Not on file     Inability: Not on file    Transportation needs:     Medical: Not on file     Non-medical: Not on file   Tobacco Use    Smoking status: Former Smoker    Smokeless tobacco: Never Used   Substance and Sexual Activity    Alcohol use: Yes     Comment: once a week glass of wine       Drug use: No    Sexual activity: Not on file   Lifestyle    Physical activity:     Days per week: Not on file     Minutes per session: Not on file    Stress: Not on file   Relationships    Social connections:     Talks on phone: Not on file     Gets together: Not on file     Attends [employer] service: Not on file     Active member of club or organization: Not on file     Attends meetings of clubs or organizations: Not on file     Relationship status: Not on file    Intimate partner violence:     Fear of current or ex partner: Not on file     Emotionally abused: Not on file     Physically abused: Not on file     Forced sexual activity: Not on file   Other Topics Concern    Not on file   Social History Narrative    Caffeine use        Review of Systems   Constitution: Negative for chills, decreased appetite, diaphoresis, fever, malaise/fatigue, night sweats, weight gain and weight loss  HENT: Negative for ear pain, hearing loss, hoarse voice, nosebleeds, sore throat and tinnitus  Eyes: Negative for blurred vision and pain  Cardiovascular: Positive for palpitations  Negative for chest pain, claudication, cyanosis, dyspnea on exertion, irregular heartbeat, leg swelling, near-syncope, orthopnea, paroxysmal nocturnal dyspnea and syncope  Respiratory: Negative for cough, hemoptysis, shortness of breath, sleep disturbances due to breathing, snoring, sputum production and wheezing  Hematologic/Lymphatic: Negative for adenopathy and bleeding problem  Does not bruise/bleed easily  Skin: Negative for color change, dry skin, flushing, itching, poor wound healing and rash  Musculoskeletal: Positive for arthritis  Negative for back pain, falls, joint pain, muscle cramps, muscle weakness, myalgias and neck pain  Gastrointestinal: Negative for abdominal pain, constipation, diarrhea, dysphagia, heartburn, hematemesis, hematochezia, melena, nausea and vomiting  Genitourinary: Negative for dysuria, frequency, hematuria, hesitancy, non-menstrual bleeding and urgency  Neurological: Positive for numbness  Negative for excessive daytime sleepiness, dizziness, focal weakness, headaches, light-headedness, loss of balance, paresthesias, tremors, vertigo and weakness  Psychiatric/Behavioral: Negative for altered mental status, depression and memory loss  The patient does not have insomnia and is not nervous/anxious  Allergic/Immunologic: Negative for environmental allergies and persistent infections         Vitals: /66 (BP Location: Left arm, Patient Position: Sitting, Cuff Size: Large)   Pulse 74   Ht 5' 4" (1 626 m)   Wt 107 kg (235 lb)   BMI 40 34 kg/m²       Physical Exam:     GEN: Alert and oriented x 3, in no acute distress  Well appearing and well nourished  HEENT: Sclera anicteric, conjunctivae pink, mucous membranes moist  Oropharynx clear  NECK: Supple, no carotid bruits, no significant JVD  Trachea midline, no thyromegaly  HEART: Regular rhythm, normal S1 and S2, no murmurs, clicks, gallops or rubs  PMI nondisplaced, no thrills  LUNGS: Clear to auscultation bilaterally; no wheezes, rales, or rhonchi  No increased work of breathing or signs of respiratory distress  ABDOMEN: Soft, nontender, nondistended, normoactive bowel sounds  EXTREMITIES: Skin warm and well perfused, no clubbing, cyanosis, or edema  NEURO: No focal findings  Normal gait  Normal speech  Mood and affect normal    SKIN: Normal without suspicious lesions on exposed skin        Lab Results:       Lab Results   Component Value Date    HGBA1C 6 6 (H) 01/15/2019    HGBA1C 6 7 (H) 07/12/2018    HGBA1C 6 7 (H) 01/10/2018     Lab Results   Component Value Date    CHOL 153 07/10/2017    CHOL 151 06/20/2016    CHOL 158 05/20/2014     Lab Results   Component Value Date    HDL 52 07/12/2018    HDL 55 07/10/2017    HDL 54 06/20/2016     No results found for: [facility]  Lab Results   Component Value Date    TRIG 127 07/12/2018    TRIG 132 07/10/2017    TRIG 121 06/20/2016     No results found for: CHOLHDL

## 2019-07-03 ENCOUNTER — OFFICE VISIT (OUTPATIENT)
Dept: CARDIOLOGY CLINIC | Facility: MEDICAL CENTER | Age: 79
End: 2019-07-03
Payer: MEDICARE

## 2019-07-03 ENCOUNTER — APPOINTMENT (OUTPATIENT)
Dept: LAB | Facility: MEDICAL CENTER | Age: 79
End: 2019-07-03
Payer: MEDICARE

## 2019-07-03 VITALS
BODY MASS INDEX: 40.12 KG/M2 | HEIGHT: 64 IN | WEIGHT: 235 LBS | SYSTOLIC BLOOD PRESSURE: 130 MMHG | HEART RATE: 74 BPM | DIASTOLIC BLOOD PRESSURE: 66 MMHG

## 2019-07-03 DIAGNOSIS — E11.3293 TYPE 2 DIABETES MELLITUS WITH BOTH EYES AFFECTED BY MILD NONPROLIFERATIVE RETINOPATHY WITHOUT MACULAR EDEMA, WITHOUT LONG-TERM CURRENT USE OF INSULIN (HCC): Primary | ICD-10-CM

## 2019-07-03 DIAGNOSIS — I47.1 PAROXYSMAL SVT (SUPRAVENTRICULAR TACHYCARDIA) (HCC): ICD-10-CM

## 2019-07-03 DIAGNOSIS — E03.9 ACQUIRED HYPOTHYROIDISM: ICD-10-CM

## 2019-07-03 DIAGNOSIS — E78.5 DYSLIPIDEMIA: ICD-10-CM

## 2019-07-03 DIAGNOSIS — I10 ESSENTIAL HYPERTENSION: ICD-10-CM

## 2019-07-03 DIAGNOSIS — E11.3293 TYPE 2 DIABETES MELLITUS WITH BOTH EYES AFFECTED BY MILD NONPROLIFERATIVE RETINOPATHY WITHOUT MACULAR EDEMA, WITHOUT LONG-TERM CURRENT USE OF INSULIN (HCC): ICD-10-CM

## 2019-07-03 DIAGNOSIS — I10 HYPERTENSION, UNSPECIFIED TYPE: ICD-10-CM

## 2019-07-03 DIAGNOSIS — I44.7 LBBB (LEFT BUNDLE BRANCH BLOCK): ICD-10-CM

## 2019-07-03 LAB
ALBUMIN SERPL BCP-MCNC: 3.9 G/DL (ref 3.5–5)
ALP SERPL-CCNC: 66 U/L (ref 46–116)
ALT SERPL W P-5'-P-CCNC: 20 U/L (ref 12–78)
ANION GAP SERPL CALCULATED.3IONS-SCNC: 7 MMOL/L (ref 4–13)
AST SERPL W P-5'-P-CCNC: 13 U/L (ref 5–45)
BILIRUB SERPL-MCNC: 0.55 MG/DL (ref 0.2–1)
BUN SERPL-MCNC: 20 MG/DL (ref 5–25)
CALCIUM SERPL-MCNC: 9.5 MG/DL (ref 8.3–10.1)
CHLORIDE SERPL-SCNC: 103 MMOL/L (ref 100–108)
CHOLEST SERPL-MCNC: 160 MG/DL (ref 50–200)
CO2 SERPL-SCNC: 32 MMOL/L (ref 21–32)
CREAT SERPL-MCNC: 0.96 MG/DL (ref 0.6–1.3)
EST. AVERAGE GLUCOSE BLD GHB EST-MCNC: 146 MG/DL
GFR SERPL CREATININE-BSD FRML MDRD: 57 ML/MIN/1.73SQ M
GLUCOSE P FAST SERPL-MCNC: 112 MG/DL (ref 65–99)
HBA1C MFR BLD: 6.7 % (ref 4.2–6.3)
HDLC SERPL-MCNC: 58 MG/DL (ref 40–60)
LDLC SERPL CALC-MCNC: 76 MG/DL (ref 0–100)
POTASSIUM SERPL-SCNC: 4.1 MMOL/L (ref 3.5–5.3)
PROT SERPL-MCNC: 7.1 G/DL (ref 6.4–8.2)
SODIUM SERPL-SCNC: 142 MMOL/L (ref 136–145)
TRIGL SERPL-MCNC: 128 MG/DL
TSH SERPL DL<=0.05 MIU/L-ACNC: 0.61 UIU/ML (ref 0.36–3.74)

## 2019-07-03 PROCEDURE — 36415 COLL VENOUS BLD VENIPUNCTURE: CPT

## 2019-07-03 PROCEDURE — 84443 ASSAY THYROID STIM HORMONE: CPT

## 2019-07-03 PROCEDURE — 80061 LIPID PANEL: CPT

## 2019-07-03 PROCEDURE — 93000 ELECTROCARDIOGRAM COMPLETE: CPT | Performed by: INTERNAL MEDICINE

## 2019-07-03 PROCEDURE — 99214 OFFICE O/P EST MOD 30 MIN: CPT | Performed by: INTERNAL MEDICINE

## 2019-07-03 PROCEDURE — 80053 COMPREHEN METABOLIC PANEL: CPT

## 2019-07-03 PROCEDURE — 83036 HEMOGLOBIN GLYCOSYLATED A1C: CPT

## 2019-07-08 ENCOUNTER — APPOINTMENT (OUTPATIENT)
Dept: LAB | Facility: MEDICAL CENTER | Age: 79
End: 2019-07-08
Payer: MEDICARE

## 2019-07-08 DIAGNOSIS — Z12.11 SCREENING FOR MALIGNANT NEOPLASM OF COLON: ICD-10-CM

## 2019-07-08 LAB — HEMOCCULT STL QL IA: NEGATIVE

## 2019-07-08 PROCEDURE — G0328 FECAL BLOOD SCRN IMMUNOASSAY: HCPCS

## 2019-07-25 DIAGNOSIS — I10 HYPERTENSION, UNSPECIFIED TYPE: ICD-10-CM

## 2019-07-26 RX ORDER — QUINAPRIL 20 MG/1
TABLET ORAL
Qty: 90 TABLET | Refills: 1 | OUTPATIENT
Start: 2019-07-26

## 2019-07-30 ENCOUNTER — OFFICE VISIT (OUTPATIENT)
Dept: FAMILY MEDICINE CLINIC | Facility: MEDICAL CENTER | Age: 79
End: 2019-07-30
Payer: MEDICARE

## 2019-07-30 VITALS
HEART RATE: 88 BPM | DIASTOLIC BLOOD PRESSURE: 60 MMHG | BODY MASS INDEX: 39.22 KG/M2 | HEIGHT: 65 IN | SYSTOLIC BLOOD PRESSURE: 110 MMHG | WEIGHT: 235.38 LBS

## 2019-07-30 DIAGNOSIS — Z12.39 SCREENING FOR MALIGNANT NEOPLASM OF BREAST: ICD-10-CM

## 2019-07-30 DIAGNOSIS — E78.5 DYSLIPIDEMIA: ICD-10-CM

## 2019-07-30 DIAGNOSIS — I10 ESSENTIAL HYPERTENSION: ICD-10-CM

## 2019-07-30 DIAGNOSIS — E66.01 MORBID OBESITY WITH BMI OF 40.0-44.9, ADULT (HCC): ICD-10-CM

## 2019-07-30 DIAGNOSIS — Z00.00 PREVENTATIVE HEALTH CARE: Primary | ICD-10-CM

## 2019-07-30 DIAGNOSIS — E03.9 ACQUIRED HYPOTHYROIDISM: ICD-10-CM

## 2019-07-30 DIAGNOSIS — E11.9 TYPE 2 DIABETES MELLITUS WITHOUT COMPLICATION, WITHOUT LONG-TERM CURRENT USE OF INSULIN (HCC): ICD-10-CM

## 2019-07-30 DIAGNOSIS — I47.1 PAROXYSMAL SVT (SUPRAVENTRICULAR TACHYCARDIA) (HCC): ICD-10-CM

## 2019-07-30 DIAGNOSIS — I47.1 PAROXYSMAL SVT (SUPRAVENTRICULAR TACHYCARDIA) (HCC): Primary | ICD-10-CM

## 2019-07-30 PROCEDURE — 99215 OFFICE O/P EST HI 40 MIN: CPT | Performed by: FAMILY MEDICINE

## 2019-07-30 PROCEDURE — G0439 PPPS, SUBSEQ VISIT: HCPCS | Performed by: FAMILY MEDICINE

## 2019-07-30 NOTE — PROGRESS NOTES
Patient lives at home independently  Patient has no concerns about the status of his health at this time  Patient lives with spouse  Has an active social life and is not socially isolated  There have been no behavioral problems  Patient is completely independent  Able to dress, bathe, ambulate, feed self etc  Patient is able to drive an automobile  There are no limitations with the patient shopping, housekeeping, yard maintenance etc     Patient tries to eat a balanced diet incorporating both the of vegetables and lean meats  Patient gets exercise by trying to walk most days  20-30 minutes  There has been no unexplained weight loss or weight gain  Patient does have advanced directives  Patient's home is well lit and  uncluttered  Night lights are used at night  Grab bars are available in the bathroom  Patient's other healthcare providers, if any, are listed in the appropriate section of this chart  Patient's vital signs and BMI were reviewed and available on this chart  Screening for depression, urinary incontinence and fall risk were performed today and those results are available in the screening section of this chart  The patient is completely oriented alert and conversant  Appropriate thought and affect  The patient's "timed up and go test" is normal (easily under 12 seconds)    Gets annual FIT testing for CRC   Has not been getting Mammograms  UTD with Immuno

## 2019-07-30 NOTE — ASSESSMENT & PLAN NOTE
Patient's A1c is at goal 6 7  She is doing well with diet and only metformin    Continue metformin, continue diabetic diet, continue to try and get plenty of exercise

## 2019-07-30 NOTE — PROGRESS NOTES
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 ([place] [address] )    Morbid obesity with BMI of 40 0-44 9, adult ([place] [address] )    Acquired hypothyroidism      Past Medical and Surgical History:     Past Medical History:   Diagnosis Date    Diabetes mellitus ([place] [address] )     Disease of thyroid gland     Hyperlipidemia     Hypertension     LBBB (left bundle branch block)     Palpitations      Past Surgical History:   Procedure Laterality Date    APPENDECTOMY       SECTION      HERNIA REPAIR      HIP SURGERY      KNEE ARTHROSCOPY Right     therapeutic     KNEE SURGERY Left     TONSILLECTOMY        Family History:     Family History   Problem Relation Age of Onset    Arthritis Mother     Other Mother         CABG    Cancer Mother     Heart disease Mother     Osteoporosis Mother     Lung cancer Father     Pancreatic cancer Father       Social History:     Social History     Tobacco Use   Smoking Status Former Smoker   Smokeless Tobacco Never Used     Social History     Substance and Sexual Activity   Alcohol Use Yes    Comment: once a week glass of wine        Social History     Substance and Sexual Activity   Drug Use No      Medications and Allergies:     Current Outpatient Medications   Medication Sig Dispense Refill    Aspirin 81 MG EC tablet Take 1 tablet by mouth daily      Cyanocobalamin (B-12 PO) Take 1 capsule by mouth daily      hydrochlorothiazide (HYDRODIURIL) 25 mg tablet TAKE 1 TABLET BY MOUTH EVERY DAY 90 tablet 1    levothyroxine 50 mcg tablet TAKE 1 TABLET BY MOUTH DAILY 90 tablet 1    magnesium Oxide (MAG-OX) 400 mg TABS Take 1 tablet by mouth daily      metFORMIN (GLUCOPHAGE) 850 mg tablet TAKE 1 TABLET BY MOUTH TWICE A DAY WITH MORNING AND EVENING MEALS 180 tablet 1    metoprolol succinate (TOPROL-XL) 25 mg 24 hr tablet Take 1 tablet (25 mg total) by mouth daily 90 tablet 3    Multiple Vitamins-Minerals (CENTRUM SILVER 50+WOMEN PO) Take 1 tablet by mouth daily       Potassium 99 MG TABS Take 1 tablet by mouth daily      quinapril (ACCUPRIL) 20 mg tablet Take 1 tablet (20 mg total) by mouth daily 90 tablet 1    simvastatin (ZOCOR) 10 mg tablet TAKE 1 TABLET BY MOUTH EVERY DAY 90 tablet 1    VITAMIN E BLEND PO Take 1 capsule by mouth daily       No current facility-administered medications for this visit  No Known Allergies   Immunizations:     Immunization History   Administered Date(s) Administered    Hep A, adult 01/28/2015, 07/28/2015    Influenza TIV (IM) 11/15/2010, 10/16/2015    Pneumococcal Conjugate 13-Valent 07/18/2017    Pneumococcal Polysaccharide PPV23 07/12/2016      Medicare Screening Tests and Risk Assessments:     April is here for her Subsequent Wellness visit  Health Risk Assessment:  Patient rates overall health as good  Patient feels that their physical health rating is Same  Eyesight was rated as Same  Hearing was rated as Same  Patient feels that their emotional and mental health rating is Same  Pain experienced by patient in the last 7 days has been None  Patient states that she has experienced weight loss or gain in last 6 months  (Additional comments: Has been gaining weight )    Emotional/Mental Health:  Patient has not been feeling nervous/anxious  PHQ-9 Depression Screening:    Frequency of the following problems over the past two weeks:      1   Little interest or pleasure in doing things: 0 - not at all      2  Feeling down, depressed, or hopeless: 0 - not at all  PHQ-2 Score: 0          Broken Bones/Falls: Fall Risk Assessment:    In the past year, patient has experienced: No history of falling in past year          Bladder/Bowel:  Patient has not leaked urine accidently in the last six months  Patient reports no loss of bowel control  Immunizations:  Patient has had a flu vaccination within the last year  Patient has received a pneumonia shot  Patient has not received a shingles shot  Patient has not received tetanus/diphtheria shot  Home Safety:  Patient has trouble with stairs inside or outside of their home  Patient currently reports that there are no safety hazards present in home, working smoke alarms, no working carbon monoxide detectors  Preventative Screenings:   No breast cancer screening performed, colon cancer screen completed, cholesterol screen completed, glaucoma eye exam completed,     Nutrition:  Current diet: Diabetic, Low Cholesterol and Low Carb with servings of the following:    Medications:  Patient is currently taking over-the-counter supplements  List of OTC medications includes: aspirin and vitamins  Patient is able to manage medications  Lifestyle Choices:  Patient reports no tobacco use  Patient has smoked or used tobacco in the past   Patient has stopped her tobacco use  Tobacco use quit date: 1976  Patient reports no alcohol use  Patient drives a vehicle  Patient wears seat belt  Current level of exercise of physical activity described by patient as: tread mill 10 minutes a day, does house work as well           Activities of Daily Living:  Can get out of bed by his or her self, able to dress self, able to make own meals, able to do own shopping, able to bathe self, can do own laundry/housekeeping, can manage own money, pay bills and track expenses    Previous Hospitalizations:  No hospitalization or ED visit in past 12 months        Advanced Directives:  Patient has decided on a power of   Patient has spoken to designated power of   Patient has completed advanced directive

## 2019-08-01 NOTE — ASSESSMENT & PLAN NOTE
Patient is being worked up by Cardiology  Today she complains of tachycardia  Her pulse rate was 160  EKG did show SVT  She had no chest pain or shortness of breath or lightheadedness  I recommended that she go to the emergency department  She declined  After she went to the bathroom and walked around the office and [address], she return to the exam room and her pulse was normal     Please see EKG that I had done while she was here  Cardiology will be contacting her to set up possible electrophysiologic approach

## 2019-08-01 NOTE — ASSESSMENT & PLAN NOTE
BMI Counseling: Body mass index is 39 78 kg/m²  Discussed the patient's BMI with her  The BMI is above average  BMI counseling and education was provided to the patient  Nutrition recommendations include reducing portion sizes, decreasing overall calorie intake and 3-5 servings of fruits/vegetables daily

## 2019-08-01 NOTE — ASSESSMENT & PLAN NOTE
Patient takes Accupril, hydrochlorothiazide and metoprolol  Blood pressure is well controlled  She takes Accupril, Hydrea Diuril and Toprol  Continue meds, low-salt diet  Continue follow-up here and with Cardiology

## 2019-08-22 DIAGNOSIS — E03.9 HYPOTHYROIDISM, UNSPECIFIED TYPE: ICD-10-CM

## 2019-08-22 RX ORDER — LEVOTHYROXINE SODIUM 0.05 MG/1
TABLET ORAL
Qty: 90 TABLET | Refills: 1 | Status: SHIPPED | OUTPATIENT
Start: 2019-08-22 | End: 2020-02-19

## 2019-08-26 ENCOUNTER — APPOINTMENT (OUTPATIENT)
Dept: LAB | Facility: MEDICAL CENTER | Age: 79
End: 2019-08-26
Payer: MEDICARE

## 2019-08-26 ENCOUNTER — TELEPHONE (OUTPATIENT)
Dept: CARDIOLOGY CLINIC | Facility: CLINIC | Age: 79
End: 2019-08-26

## 2019-08-26 ENCOUNTER — CONSULT (OUTPATIENT)
Dept: CARDIOLOGY CLINIC | Facility: CLINIC | Age: 79
End: 2019-08-26
Payer: MEDICARE

## 2019-08-26 VITALS
BODY MASS INDEX: 39.2 KG/M2 | HEART RATE: 68 BPM | WEIGHT: 235.3 LBS | SYSTOLIC BLOOD PRESSURE: 122 MMHG | DIASTOLIC BLOOD PRESSURE: 78 MMHG | HEIGHT: 65 IN

## 2019-08-26 DIAGNOSIS — I47.1 PAROXYSMAL SVT (SUPRAVENTRICULAR TACHYCARDIA) (HCC): ICD-10-CM

## 2019-08-26 DIAGNOSIS — I47.1 PAROXYSMAL SVT (SUPRAVENTRICULAR TACHYCARDIA) (HCC): Primary | ICD-10-CM

## 2019-08-26 LAB
ALBUMIN SERPL BCP-MCNC: 3.8 G/DL (ref 3.5–5)
ALP SERPL-CCNC: 66 U/L (ref 46–116)
ALT SERPL W P-5'-P-CCNC: 21 U/L (ref 12–78)
ANION GAP SERPL CALCULATED.3IONS-SCNC: 10 MMOL/L (ref 4–13)
AST SERPL W P-5'-P-CCNC: 14 U/L (ref 5–45)
BASOPHILS # BLD AUTO: 0.05 THOUSANDS/ΜL (ref 0–0.1)
BASOPHILS NFR BLD AUTO: 1 % (ref 0–1)
BILIRUB SERPL-MCNC: 0.42 MG/DL (ref 0.2–1)
BUN SERPL-MCNC: 21 MG/DL (ref 5–25)
CALCIUM SERPL-MCNC: 9.8 MG/DL (ref 8.3–10.1)
CHLORIDE SERPL-SCNC: 102 MMOL/L (ref 100–108)
CO2 SERPL-SCNC: 29 MMOL/L (ref 21–32)
CREAT SERPL-MCNC: 1.01 MG/DL (ref 0.6–1.3)
EOSINOPHIL # BLD AUTO: 0.15 THOUSAND/ΜL (ref 0–0.61)
EOSINOPHIL NFR BLD AUTO: 2 % (ref 0–6)
ERYTHROCYTE [DISTWIDTH] IN BLOOD BY AUTOMATED COUNT: 13.2 % (ref 11.6–15.1)
GFR SERPL CREATININE-BSD FRML MDRD: 54 ML/MIN/1.73SQ M
GLUCOSE SERPL-MCNC: 176 MG/DL (ref 65–140)
HCT VFR BLD AUTO: 40.9 % (ref 34.8–46.1)
HGB BLD-MCNC: 13.1 G/DL (ref 11.5–15.4)
IMM GRANULOCYTES # BLD AUTO: 0.01 THOUSAND/UL (ref 0–0.2)
IMM GRANULOCYTES NFR BLD AUTO: 0 % (ref 0–2)
LYMPHOCYTES # BLD AUTO: 1.83 THOUSANDS/ΜL (ref 0.6–4.47)
LYMPHOCYTES NFR BLD AUTO: 30 % (ref 14–44)
MCH RBC QN AUTO: 29.1 PG (ref 26.8–34.3)
MCHC RBC AUTO-ENTMCNC: 32 G/DL (ref 31.4–37.4)
MCV RBC AUTO: 91 FL (ref 82–98)
MONOCYTES # BLD AUTO: 0.36 THOUSAND/ΜL (ref 0.17–1.22)
MONOCYTES NFR BLD AUTO: 6 % (ref 4–12)
NEUTROPHILS # BLD AUTO: 3.77 THOUSANDS/ΜL (ref 1.85–7.62)
NEUTS SEG NFR BLD AUTO: 61 % (ref 43–75)
NRBC BLD AUTO-RTO: 0 /100 WBCS
PLATELET # BLD AUTO: 211 THOUSANDS/UL (ref 149–390)
PMV BLD AUTO: 10.2 FL (ref 8.9–12.7)
POTASSIUM SERPL-SCNC: 3.7 MMOL/L (ref 3.5–5.3)
PROT SERPL-MCNC: 7.3 G/DL (ref 6.4–8.2)
RBC # BLD AUTO: 4.5 MILLION/UL (ref 3.81–5.12)
SODIUM SERPL-SCNC: 141 MMOL/L (ref 136–145)
WBC # BLD AUTO: 6.17 THOUSAND/UL (ref 4.31–10.16)

## 2019-08-26 PROCEDURE — 85025 COMPLETE CBC W/AUTO DIFF WBC: CPT

## 2019-08-26 PROCEDURE — 93000 ELECTROCARDIOGRAM COMPLETE: CPT | Performed by: INTERNAL MEDICINE

## 2019-08-26 PROCEDURE — 99204 OFFICE O/P NEW MOD 45 MIN: CPT | Performed by: INTERNAL MEDICINE

## 2019-08-26 PROCEDURE — 36415 COLL VENOUS BLD VENIPUNCTURE: CPT

## 2019-08-26 PROCEDURE — 80053 COMPREHEN METABOLIC PANEL: CPT

## 2019-08-26 NOTE — TELEPHONE ENCOUNTER
Patient schedule for SVT ablation per Dr Wiley Perdomo at [facility] on 09/18/2019  Patient aware of medications holds:  -Metformin hold the morning of the procedure ( only take once a day)  -metorpolol hold the morning of the procedure, as per Dr Wiley Perdomo  -Hydrochlorothiazide hold the morning of the procedure  Patient will go to get blood test at [facility] can you please see if patient will need insurance approval for this service

## 2019-08-26 NOTE — H&P (VIEW-ONLY)
EPS Consultation/New Patient Evaluation - April Fengguo 70 y o  female MRN: [id number]       Referring: Dr Vivien Mendieta    CC/HPI:   It was a pleasure to see Marty Samuel in our arrhythmia clinic at [place] [address]  As you know she is a 70 y o  woman with history of diabetes, hyperlipidemia, hypertension, left bundle branch block who is here to discuss management of her palpitations  She has had palpitation for several years  She now has increased frequency of her palpitation which are occurring every 2 weeks as opposed to occurring every 2 months  Frequency of palpitation started to increase in February of 2019  She was on metoprolol daily and was taking additional dose p r n  whenever she had palpitations  The palpitations have sudden onset and offset  They can last anywhere from 5 minutes to 5 hours  She denies any associated activities  She tries to do Valsalva maneuvers either by bending forward taking deep breath and holding it  Most of her episodes resolve after having a bowel movement  Two week event monitor in October 2015 showed her having occasional PVCs and PACs as well as short episodes of SVT  She had another event monitor during which on July 21st 2018 she had an episode of palpitation  Her event monitor showed she had tachycardia with heart rate up to 164 beats per minute  Her metoprolol dose was increased to however she continues to have the episodes  She otherwise denies any chest pain, numbness tingling, swelling in lower extremities, orthopnea, PND or sleep apnea        Past Medical History:  Past Medical History:   Diagnosis Date    Diabetes mellitus ([facility] [address] )     Disease of thyroid gland     Hyperlipidemia     Hypertension     LBBB (left bundle branch block)     Palpitations        Medications:      Current Outpatient Medications:     Aspirin 81 MG EC tablet, Take 1 tablet by mouth daily, Disp: , Rfl:     Cyanocobalamin (B-12 PO), Take 1 capsule by mouth daily, Disp: , Rfl:     hydrochlorothiazide (HYDRODIURIL) 25 mg tablet, TAKE 1 TABLET BY MOUTH EVERY DAY, Disp: 90 tablet, Rfl: 1    levothyroxine 50 mcg tablet, TAKE 1 TABLET BY MOUTH DAILY, Disp: 90 tablet, Rfl: 1    magnesium Oxide (MAG-OX) 400 mg TABS, Take 1 tablet by mouth daily, Disp: , Rfl:     metFORMIN (GLUCOPHAGE) 850 mg tablet, TAKE 1 TABLET BY MOUTH TWICE A DAY WITH MORNING AND EVENING MEALS, Disp: 180 tablet, Rfl: 1    metoprolol succinate (TOPROL-XL) 25 mg 24 hr tablet, Take 1 tablet (25 mg total) by mouth daily, Disp: 90 tablet, Rfl: 3    Multiple Vitamins-Minerals (CENTRUM SILVER 50+WOMEN PO), Take 1 tablet by mouth daily , Disp: , Rfl:     Potassium 99 MG TABS, Take 1 tablet by mouth daily, Disp: , Rfl:     quinapril (ACCUPRIL) 20 mg tablet, Take 1 tablet (20 mg total) by mouth daily, Disp: 90 tablet, Rfl: 1    simvastatin (ZOCOR) 10 mg tablet, TAKE 1 TABLET BY MOUTH EVERY DAY, Disp: 90 tablet, Rfl: 1    VITAMIN E BLEND PO, Take 1 capsule by mouth daily, Disp: , Rfl:      Family History   Problem Relation Age of Onset    Arthritis Mother     Other Mother         CABG    Cancer Mother     Heart disease Mother     Osteoporosis Mother     Lung cancer Father     Pancreatic cancer Father      Social History     Socioeconomic History    Marital status: /Civil Union     Spouse name: Not on file    Number of children: Not on file    Years of education: Not on file    Highest education level: Not on file   Occupational History    Not on file   Social Needs    Financial resource strain: Not on file    Food insecurity:     Worry: Not on file     Inability: Not on file    Transportation needs:     Medical: Not on file     Non-medical: Not on file   Tobacco Use    Smoking status: Former Smoker    Smokeless tobacco: Never Used   Substance and Sexual Activity    Alcohol use: Yes     Comment: once a week glass of wine       Drug use: No    Sexual activity: Not on file Lifestyle    Physical activity:     Days per week: Not on file     Minutes per session: Not on file    Stress: Not on file   Relationships    Social connections:     Talks on phone: Not on file     Gets together: Not on file     Attends Cheondoism service: Not on file     Active member of club or organization: Not on file     Attends meetings of clubs or organizations: Not on file     Relationship status: Not on file    Intimate partner violence:     Fear of current or ex partner: Not on file     Emotionally abused: Not on file     Physically abused: Not on file     Forced sexual activity: Not on file   Other Topics Concern    Not on file   Social History Narrative    Caffeine use      Social History     Tobacco Use   Smoking Status Former Smoker   Smokeless Tobacco Never Used     Social History     Substance and Sexual Activity   Alcohol Use Yes    Comment: once a week glass of wine  Review of Systems   Constitution: Positive for malaise/fatigue  Negative for chills and fever  HENT: Negative  Eyes: Negative for blurred vision and double vision  Cardiovascular: Positive for palpitations  Negative for chest pain, dyspnea on exertion, irregular heartbeat, leg swelling, near-syncope, orthopnea and syncope  Respiratory: Negative for cough, sputum production and wheezing  Skin: Negative for rash  Musculoskeletal: Negative  Gastrointestinal: Negative for abdominal pain, nausea and vomiting  Neurological: Negative for dizziness and light-headedness  Psychiatric/Behavioral: Negative  Objective:     Vitals: /78 (BP Location: Right arm, Patient Position: Sitting, Cuff Size: Large)   Pulse 68   Ht 5' 4 5" (1 638 m)   Wt 107 kg (235 lb 4 8 oz)   BMI 39 77 kg/m²      Physical Exam:    GEN: Fabiano Mckay appears well, alert and oriented x 3, pleasant and cooperative  Morbidly obese      HEENT: pupils equal, round, and reactive to light; extraocular muscles intact  NECK: supple, no carotid bruits   HEART: regular rhythm, normal S1 and S2, no murmurs, clicks, gallops or rubs   LUNGS: clear to auscultation bilaterally; no wheezes, rales, or rhonchi   ABDOMEN: normal bowel sounds, soft, no tenderness, no distention  EXTREMITIES: peripheral pulses normal; no clubbing, cyanosis, or edema  NEURO: no focal findings   SKIN: normal without suspicious lesions on exposed skin      Labs & Results:  Below is the patient's most recent value for Albumin, ALT, AST, BUN, Calcium, Chloride, Cholesterol, CO2, Creatinine, GFR, Glucose, HDL, Hematocrit, Hemoglobin, Hemoglobin A1C, LDL, Magnesium, Phosphorus, Platelets, Potassium, PSA, Sodium, Triglycerides, and WBC  Lab Results   Component Value Date    ALT 20 2019    AST 13 2019    BUN 20 2019    CALCIUM 9 5 2019     2019    CHOL 153 07/10/2017    CO2 32 2019    CREATININE 0 96 2019    HDL 58 2019    HCT 41 7 10/14/2015    HGB 13 8 10/14/2015    HGBA1C 6 7 (H) 2019    MG 1 7 10/14/2015     10/14/2015    K 4 1 2019     01/10/2018    TRIG 128 2019    WBC 7 17 10/14/2015     Note: for a comprehensive list of the patient's lab results, access the Results Review activity  Cardiac testing:     I personally reviewed the ECG performed in the clinic on 19  It reveals sinus rhythm with heart rate of 68 beats per minute, left bundle branch block  QRS of 138 millisecond  DC interval of 154 millisecond  QTC of 461 millisecond      Echocardiograms:  Results for orders placed during the hospital encounter of 10/14/15   Echo complete with contrast if indicated    Narrative [facility]  [facility], [address] [facility]  [phone]    Transthoracic Echocardiogram  2D, M-mode, Doppler, and Color Doppler    Study date:  15-Oct-2015    Patient: Kimberlee Hernandez  MR number: [id number]  Account number: [de-identified]  : [id number]  Age: 70 years  Gender: Female  Status: Inpatient  Location: Bedside  Height: 64 in  Weight: 246 lb  BP: 138/ 72 mmHg    Indications: Shortness of breath  Diagnoses: R06 02 - Shortness of breath    Sonographer:  FRANCA Barrios  Primary Physician:  Anna Lewis MD  Referring Physician:  Winston Driscoll DO  Group:  [id number]  Interpreting Physician:  Valentino Hosteller, MD    SUMMARY    LEFT VENTRICLE:  Systolic function was at the lower limits of normal by visual assessment  Ejection fraction was estimated to be 55 %  There was septal dyskinesis  The  other walls showed grossly normal wall motion  This study was inadequate for a  more detailed evaluation of regional wall motion due to poor endocardial  definition  Doppler parameters were consistent with abnormal left ventricular  relaxation (grade 1 diastolic dysfunction)  VENTRICULAR SEPTUM:  There was paradoxical motion  These changes are consistent with LBBB  RIGHT VENTRICLE:  The size was normal   Systolic function was normal     MITRAL VALVE:  There was mild annular calcification  There was mild regurgitation  AORTIC VALVE:  The valve was probably trileaflet  Leaflets exhibited mildly increased  thickness, mild calcification, and mildly reduced cuspal separation  There was mild regurgitation  The valve was not well visualized  TRICUSPID VALVE:  There was trace regurgitation  HISTORY: PRIOR HISTORY: palpitations Risk factors: hypertension, diabetes, and  medication-treated hypercholesterolemia  PROCEDURE: The procedure was performed at the bedside  This was a routine  study  The transthoracic approach was used  The study included complete 2D  imaging, M-mode, complete spectral Doppler, and color Doppler  Echocardiographic views were limited due to decreased penetration and lung  interference  This was a technically difficult study      LEFT VENTRICLE: Size was normal  Systolic function was at the lower limits of  normal by visual assessment  Ejection fraction was estimated to be 55 %  There  was septal dyskinesis  The other walls showed grossly normal wall motion  This  study was inadequate for a more detailed evaluation of regional wall motion due  to poor endocardial definition  Wall thickness was normal  DOPPLER: Doppler  parameters were consistent with abnormal left ventricular relaxation (grade 1  diastolic dysfunction)  VENTRICULAR SEPTUM: There was paradoxical motion  These changes are consistent  with LBBB  RIGHT VENTRICLE: The size was normal  Systolic function was normal  Wall  thickness was normal     LEFT ATRIUM: Size was normal     RIGHT ATRIUM: Size was normal     MITRAL VALVE: There was mild annular calcification  Valve structure was normal   There was normal leaflet separation  DOPPLER: The transmitral velocity was  within the normal range  There was no evidence for stenosis  There was mild  regurgitation  AORTIC VALVE: The valve was probably trileaflet  Leaflets exhibited mildly  increased thickness, mild calcification, and mildly reduced cuspal separation  The valve was not well visualized  DOPPLER: Transaortic velocity was within the  normal range  There was no evidence for stenosis  There was mild regurgitation  TRICUSPID VALVE: The valve structure was normal  There was normal leaflet  separation  DOPPLER: The transtricuspid velocity was within the normal range  There was no evidence for stenosis  There was trace regurgitation  PULMONIC VALVE: Leaflets exhibited normal thickness, no calcification, and  normal cuspal separation  DOPPLER: The transpulmonic velocity was within the  normal range  There was no significant regurgitation  PERICARDIUM: There was no pericardial effusion  The pericardium was normal in  appearance  AORTA: The root exhibited normal size  SYSTEMIC VEINS: IVC: The inferior vena cava was normal in size   Respirophasic  changes were normal     MEASUREMENT TABLES    OTHER ECHO MEASUREMENTS  (Reference normals)  Estimated CVP   5 mmHg   (--)    SYSTEM MEASUREMENT TABLES    2D mode  AV Diam: 2 9 cm  AoR Diam; Mean (2D): 2 9 cm  LA Diam (2D): 3 4 cm  LA Dimension; Mean (2D): 3 4 cm  LA/Ao (2D): 1 17  EDV (2D-Cubed): 113 cm3  EF (2D-Cubed): 71 %  ESV (2D-Cubed): 32 8 cm3  FS (2D-Cubed): 33 7 %  FS (2D-Teich): 33 7 %  IVS/LVPW (2D): 1 66  IVSd (2D): 1 61 cm  IVSd; Mean chosen (2D): 1 61 cm  LVIDd (2D): 4 83 cm  LVIDd; Mean (2D): 4 83 cm  LVIDs (2D): 3 2 cm  LVIDs; Mean (2D): 3 2 cm  LVPWd (2D): 0 97 cm  LVPWd; Mean (2D): 0 97 cm  Left Ventricular Ejection Fraction; Teichholz; 2D mode;: 62 4 %  Left Ventricular End Diastolic Volume; Teichholz; 2D mode;: 109 cm3  Left Ventricular End Systolic Volume; Teichholz; 2D mode;: 41 cm3  SV (2D-Cubed): 80 2 cm3  Stroke Volume; Teichholz; 2D mode;: 68 cm3    Apical four chamber  LV MOD Diam; Recent value; End Diastole (A4C): 2 83 cm  LV MOD Diam; Recent value; End Diastole (A4C): 5 3 cm  LV MOD Diam; Recent value; End Diastole (A4C): 4 6 cm  LV MOD Diam; Recent value; End Diastole (A4C): 5 51 cm  LV MOD Diam; Recent value; End Diastole (A4C): 5 57 cm  LV MOD Diam; Recent value; End Diastole (A4C): 5 41 cm  LV MOD Diam; Recent value; End Diastole (A4C): 4 73 cm  LV MOD Diam; Recent value; End Diastole (A4C): 2 39 cm  LV MOD Diam; Recent value; End Diastole (A4C): 3 02 cm  LV MOD Diam; Recent value; End Diastole (A4C): 3 38 cm  LV MOD Diam; Recent value; End Diastole (A4C): 3 5 cm  LV MOD Diam; Recent value; End Diastole (A4C): 3 65 cm  LV MOD Diam; Recent value; End Diastole (A4C): 3 86 cm  LV MOD Diam; Recent value; End Diastole (A4C): 4 03 cm  LV MOD Diam; Recent value; End Diastole (A4C): 4 15 cm  LV MOD Diam; Recent value; End Diastole (A4C): 4 31 cm  LV MOD Diam; Recent value; End Diastole (A4C): 4 41 cm  LV MOD Diam; Recent value; End Diastole (A4C): 5 11 cm  LV MOD Diam; Recent value; End Diastole (A4C): 5 47 cm  LV MOD Diam; Recent value;  End Diastole (A4C): 5 56 cm  LV MOD Diam; Recent value; End Systole (A4C): 2 36 cm  LV MOD Diam; Recent value; End Systole (A4C): 3 83 cm  LV MOD Diam; Recent value; End Systole (A4C): 3 79 cm  LV MOD Diam; Recent value; End Systole (A4C): 3 73 cm  LV MOD Diam; Recent value; End Systole (A4C): 3 36 cm  LV MOD Diam; Recent value; End Systole (A4C): 2 15 cm  LV MOD Diam; Recent value; End Systole (A4C): 2 46 cm  LV MOD Diam; Recent value; End Systole (A4C): 2 71 cm  LV MOD Diam; Recent value; End Systole (A4C): 2 86 cm  LV MOD Diam; Recent value; End Systole (A4C): 2 98 cm  LV MOD Diam; Recent value; End Systole (A4C): 3 08 cm  LV MOD Diam; Recent value; End Systole (A4C): 3 08 cm  LV MOD Diam; Recent value; End Systole (A4C): 3 2 cm  LV MOD Diam; Recent value; End Systole (A4C): 3 29 cm  LV MOD Diam; Recent value; End Systole (A4C): 1 03 cm  LV MOD Diam; Recent value; End Systole (A4C): 1 46 cm  LV MOD Diam; Recent value; End Systole (A4C): 1 87 cm  LV MOD Diam; Recent value; End Systole (A4C): 3 51 cm  LV MOD Diam; Recent value; End Systole (A4C): 3 8 cm  LV MOD Diam; Recent value; End Systole (A4C): 3 86 cm  LVEF MOD A4C: 62 5 %  Left Ventricle diastolic major axis; Most recent value chosen; Method of Disks,  Single Plane; 2D mode; Apical four chamber;: 8 14 cm Left Ventricle systolic  major axis; Most recent value chosen; Method of Disks, Single Plane; 2D mode; Apical four chamber;: 6 56 cm Left Ventricular Diastolic Area; Most recent  value chosen; Method of Disks, Single Plane; 2D mode; Apical four chamber;:  3590 mm2 Left Ventricular End Diastolic Volume; Most recent value chosen;  Method of Disks, Single Plane; 2D mode; Apical four chamber;: 126 cm3 Left  Ventricular End Systolic Volume; Most recent value chosen; Method of Disks,  Single Plane; 2D mode; Apical four chamber;: 47 2 cm3 Left Ventricular Systolic  Area; Most recent value chosen; Method of Disks, Single Plane; 2D mode;  Apical  four chamber;: 1920 mm2  SV MOD A4C: 78 8 cm3    Tissue Doppler Imaging  LV Peak Early Lemus Tissue Jude; Medial MA (TDI): 74 mm/s  Left Ventricular Peak Early Diastolic Tissue Velocity; Mean; Mean value chosen;  Medial Mitral Annulus; Tissue Doppler Imaging;: 74 mm/s    Unspecified Scan Mode  Dec Dent; Mean; Regurgitant Flow: 1500 mm/s2  Dec Dent; Regurgitant Flow: 1500 mm/s2  PHT; Mean; Regurgitant Flow: 571 ms  PHT; Regurgitant Flow: 571 ms  Peak Grad; Mean; Regurgitant Flow: 34 mm[Hg]  Vmax; Mean; Regurgitant Flow: 2930 mm/s  Vmax; Regurgitant Flow: 2930 mm/s  MV Peak Jude/LV Peak Tissue Jude E-Wave; Medial MA: 8 6  Dec Dent; Antegrade Flow: 3030 mm/s2  Dec Dent; Mean; Antegrade Flow: 3030 mm/s2  MV A Jude: 1120 mm/s  MV E Jude: 640 mm/s  MV E/A Ratio: 0 6  MV Peak A Jude: 1120 mm/s  MV Peak E Jude; Mean; Antegrade Flow: 640 mm/s  Peak Grad; Mean; Regurgitant Flow: 26 mm[Hg]  Vmax; Mean; Regurgitant Flow: 2540 mm/s  Vmax; Regurgitant Flow: 2960 mm/s  Vmax; Regurgitant Flow: 2120 mm/s  Distance;: 3 2 cm  Distance; Mean; Mean value chosen;: 3 2 cm    [facility]    Prepared and electronically signed by    Jackie Mcgee MD  Signed 16-Oct-2015 09:50:40       No results found for this or any previous visit  Catheterizations:   No results found for this or any previous visit  Stress Tests:  No results found for this or any previous visit  Holter monitor -   Event monitor 7/18/19-8/16/19  Total days monitor -11  Total cut strip count 25; symptomatic 2; asymptomatic 23  Average heart rate of 66 beats per minute; low heart rate of 50 beats per minute; high heart rate of 164 beats per minute  She had 2 episodes of heart racing which shows SVT with heart rate of 164 beats per minute occurring on 07/21/2019 at 7:30 p m and the 2nd episode occurred on 07/22/2019 at 7:30 a m  with maximum heart rate of 163 beats per minute      There was a 3rd SVT episode that did not have any symptoms associated with it  It occurred on 07/23/2019 at 7:41 p m  maximum heart rate was 157 beats per minute  ASSESSMENT:  #Diabetes  #Hyperlipidemia  #Hypertension  #Left bundle branch block  #Palpitations    SUMMARY:    In summary, Ms Meliza Sanchez is a 70 y o  woman with history of diabetes, hyperlipidemia, hypertension, left bundle branch block who has been having palpitations for several years that are now increasing in frequency  She has been terminating the episode with Valsalva maneuver or having a bowel movement  She is quite symptomatic during these palpitation episodes and would prefer to have definitive therapy  Her event monitor shows she has SVT episodes that are occurring at heart rate of 164 beats per minute  She has a baseline left bundle branch block so it is difficult to discern which type of SVT it is  We discussed multiple mechanism of SVT including atrial tachycardia, av susan reentrant tachycardia and AV reentrant tachycardia  On ECG she does not seem to have any accessory pathway  We discussed in detail multiple options including antiarrhythmic medication versus ablation therapy  After discussing the risk and benefits in detail of each option and she opted for ablation  Our office will be in touch with her to schedule the procedure  She will continue all of her medication except stopping metoprolol day before the procedure  She is welcome to call our office with any questions      PLAN:  #Schedule EPS plus SVT ablation  #Hold metoprolol starting day before the procedure

## 2019-08-26 NOTE — PROGRESS NOTES
EPS Consultation/New Patient Evaluation - April Rome Pel 70 y o  female MRN: [id number]       Referring: Dr Danni Castano    CC/HPI:   It was a pleasure to see Geovanna Ng in our arrhythmia clinic at [place] [address]  As you know she is a 70 y o  woman with history of diabetes, hyperlipidemia, hypertension, left bundle branch block who is here to discuss management of her palpitations  She has had palpitation for several years  She now has increased frequency of her palpitation which are occurring every 2 weeks as opposed to occurring every 2 months  Frequency of palpitation started to increase in February of 2019  She was on metoprolol daily and was taking additional dose p r n  whenever she had palpitations  The palpitations have sudden onset and offset  They can last anywhere from 5 minutes to 5 hours  She denies any associated activities  She tries to do Valsalva maneuvers either by bending forward taking deep breath and holding it  Most of her episodes resolve after having a bowel movement  Two week event monitor in October 2015 showed her having occasional PVCs and PACs as well as short episodes of SVT  She had another event monitor during which on July 21st 2018 she had an episode of palpitation  Her event monitor showed she had tachycardia with heart rate up to 164 beats per minute  Her metoprolol dose was increased to however she continues to have the episodes  She otherwise denies any chest pain, numbness tingling, swelling in lower extremities, orthopnea, PND or sleep apnea        Past Medical History:  Past Medical History:   Diagnosis Date    Diabetes mellitus ([facility] [address] )     Disease of thyroid gland     Hyperlipidemia     Hypertension     LBBB (left bundle branch block)     Palpitations        Medications:      Current Outpatient Medications:     Aspirin 81 MG EC tablet, Take 1 tablet by mouth daily, Disp: , Rfl:     Cyanocobalamin (B-12 PO), Take 1 capsule by mouth daily, Disp: , Rfl:     hydrochlorothiazide (HYDRODIURIL) 25 mg tablet, TAKE 1 TABLET BY MOUTH EVERY DAY, Disp: 90 tablet, Rfl: 1    levothyroxine 50 mcg tablet, TAKE 1 TABLET BY MOUTH DAILY, Disp: 90 tablet, Rfl: 1    magnesium Oxide (MAG-OX) 400 mg TABS, Take 1 tablet by mouth daily, Disp: , Rfl:     metFORMIN (GLUCOPHAGE) 850 mg tablet, TAKE 1 TABLET BY MOUTH TWICE A DAY WITH MORNING AND EVENING MEALS, Disp: 180 tablet, Rfl: 1    metoprolol succinate (TOPROL-XL) 25 mg 24 hr tablet, Take 1 tablet (25 mg total) by mouth daily, Disp: 90 tablet, Rfl: 3    Multiple Vitamins-Minerals (CENTRUM SILVER 50+WOMEN PO), Take 1 tablet by mouth daily , Disp: , Rfl:     Potassium 99 MG TABS, Take 1 tablet by mouth daily, Disp: , Rfl:     quinapril (ACCUPRIL) 20 mg tablet, Take 1 tablet (20 mg total) by mouth daily, Disp: 90 tablet, Rfl: 1    simvastatin (ZOCOR) 10 mg tablet, TAKE 1 TABLET BY MOUTH EVERY DAY, Disp: 90 tablet, Rfl: 1    VITAMIN E BLEND PO, Take 1 capsule by mouth daily, Disp: , Rfl:      Family History   Problem Relation Age of Onset    Arthritis Mother     Other Mother         CABG    Cancer Mother     Heart disease Mother     Osteoporosis Mother     Lung cancer Father     Pancreatic cancer Father      Social History     Socioeconomic History    Marital status: /Civil Union     Spouse name: Not on file    Number of children: Not on file    Years of education: Not on file    Highest education level: Not on file   Occupational History    Not on file   Social Needs    Financial resource strain: Not on file    Food insecurity:     Worry: Not on file     Inability: Not on file    Transportation needs:     Medical: Not on file     Non-medical: Not on file   Tobacco Use    Smoking status: Former Smoker    Smokeless tobacco: Never Used   Substance and Sexual Activity    Alcohol use: Yes     Comment: once a week glass of wine       Drug use: No    Sexual activity: Not on file Lifestyle    Physical activity:     Days per week: Not on file     Minutes per session: Not on file    Stress: Not on file   Relationships    Social connections:     Talks on phone: Not on file     Gets together: Not on file     Attends Yarsanism service: Not on file     Active member of club or organization: Not on file     Attends meetings of clubs or organizations: Not on file     Relationship status: Not on file    Intimate partner violence:     Fear of current or ex partner: Not on file     Emotionally abused: Not on file     Physically abused: Not on file     Forced sexual activity: Not on file   Other Topics Concern    Not on file   Social History Narrative    Caffeine use      Social History     Tobacco Use   Smoking Status Former Smoker   Smokeless Tobacco Never Used     Social History     Substance and Sexual Activity   Alcohol Use Yes    Comment: once a week glass of wine  Review of Systems   Constitution: Positive for malaise/fatigue  Negative for chills and fever  HENT: Negative  Eyes: Negative for blurred vision and double vision  Cardiovascular: Positive for palpitations  Negative for chest pain, dyspnea on exertion, irregular heartbeat, leg swelling, near-syncope, orthopnea and syncope  Respiratory: Negative for cough, sputum production and wheezing  Skin: Negative for rash  Musculoskeletal: Negative  Gastrointestinal: Negative for abdominal pain, nausea and vomiting  Neurological: Negative for dizziness and light-headedness  Psychiatric/Behavioral: Negative  Objective:     Vitals: /78 (BP Location: Right arm, Patient Position: Sitting, Cuff Size: Large)   Pulse 68   Ht 5' 4 5" (1 638 m)   Wt 107 kg (235 lb 4 8 oz)   BMI 39 77 kg/m²      Physical Exam:    GEN: Rocio Ache appears well, alert and oriented x 3, pleasant and cooperative  Morbidly obese      HEENT: pupils equal, round, and reactive to light; extraocular muscles intact  NECK: supple, no carotid bruits   HEART: regular rhythm, normal S1 and S2, no murmurs, clicks, gallops or rubs   LUNGS: clear to auscultation bilaterally; no wheezes, rales, or rhonchi   ABDOMEN: normal bowel sounds, soft, no tenderness, no distention  EXTREMITIES: peripheral pulses normal; no clubbing, cyanosis, or edema  NEURO: no focal findings   SKIN: normal without suspicious lesions on exposed skin      Labs & Results:  Below is the patient's most recent value for Albumin, ALT, AST, BUN, Calcium, Chloride, Cholesterol, CO2, Creatinine, GFR, Glucose, HDL, Hematocrit, Hemoglobin, Hemoglobin A1C, LDL, Magnesium, Phosphorus, Platelets, Potassium, PSA, Sodium, Triglycerides, and WBC  Lab Results   Component Value Date    ALT 20 2019    AST 13 2019    BUN 20 2019    CALCIUM 9 5 2019     2019    CHOL 153 07/10/2017    CO2 32 2019    CREATININE 0 96 2019    HDL 58 2019    HCT 41 7 10/14/2015    HGB 13 8 10/14/2015    HGBA1C 6 7 (H) 2019    MG 1 7 10/14/2015     10/14/2015    K 4 1 2019     01/10/2018    TRIG 128 2019    WBC 7 17 10/14/2015     Note: for a comprehensive list of the patient's lab results, access the Results Review activity  Cardiac testing:     I personally reviewed the ECG performed in the clinic on 19  It reveals sinus rhythm with heart rate of 68 beats per minute, left bundle branch block  QRS of 138 millisecond  WY interval of 154 millisecond  QTC of 461 millisecond      Echocardiograms:  Results for orders placed during the hospital encounter of 10/14/15   Echo complete with contrast if indicated    Narrative [facility]  [facility] [address] [facility]  [phone]    Transthoracic Echocardiogram  2D, M-mode, Doppler, and Color Doppler    Study date:  15-Oct-2015    Patient: Mau Corona  MR number: [id number]  Account number: [de-identified]  : [id number]  Age: 70 years  Gender: Female  Status: Inpatient  Location: Bedside  Height: 64 in  Weight: 246 lb  BP: 138/ 72 mmHg    Indications: Shortness of breath  Diagnoses: R06 02 - Shortness of breath    Sonographer:  FRANCA Sanchez  Primary Physician:  Desmond Khan MD  Referring Physician:  Mateusz Matias DO  Group:  [id number]  Interpreting Physician:  Juliano Guajardo MD    SUMMARY    LEFT VENTRICLE:  Systolic function was at the lower limits of normal by visual assessment  Ejection fraction was estimated to be 55 %  There was septal dyskinesis  The  other walls showed grossly normal wall motion  This study was inadequate for a  more detailed evaluation of regional wall motion due to poor endocardial  definition  Doppler parameters were consistent with abnormal left ventricular  relaxation (grade 1 diastolic dysfunction)  VENTRICULAR SEPTUM:  There was paradoxical motion  These changes are consistent with LBBB  RIGHT VENTRICLE:  The size was normal   Systolic function was normal     MITRAL VALVE:  There was mild annular calcification  There was mild regurgitation  AORTIC VALVE:  The valve was probably trileaflet  Leaflets exhibited mildly increased  thickness, mild calcification, and mildly reduced cuspal separation  There was mild regurgitation  The valve was not well visualized  TRICUSPID VALVE:  There was trace regurgitation  HISTORY: PRIOR HISTORY: palpitations Risk factors: hypertension, diabetes, and  medication-treated hypercholesterolemia  PROCEDURE: The procedure was performed at the bedside  This was a routine  study  The transthoracic approach was used  The study included complete 2D  imaging, M-mode, complete spectral Doppler, and color Doppler  Echocardiographic views were limited due to decreased penetration and lung  interference  This was a technically difficult study      LEFT VENTRICLE: Size was normal  Systolic function was at the lower limits of  normal by visual assessment  Ejection fraction was estimated to be 55 %  There  was septal dyskinesis  The other walls showed grossly normal wall motion  This  study was inadequate for a more detailed evaluation of regional wall motion due  to poor endocardial definition  Wall thickness was normal  DOPPLER: Doppler  parameters were consistent with abnormal left ventricular relaxation (grade 1  diastolic dysfunction)  VENTRICULAR SEPTUM: There was paradoxical motion  These changes are consistent  with LBBB  RIGHT VENTRICLE: The size was normal  Systolic function was normal  Wall  thickness was normal     LEFT ATRIUM: Size was normal     RIGHT ATRIUM: Size was normal     MITRAL VALVE: There was mild annular calcification  Valve structure was normal   There was normal leaflet separation  DOPPLER: The transmitral velocity was  within the normal range  There was no evidence for stenosis  There was mild  regurgitation  AORTIC VALVE: The valve was probably trileaflet  Leaflets exhibited mildly  increased thickness, mild calcification, and mildly reduced cuspal separation  The valve was not well visualized  DOPPLER: Transaortic velocity was within the  normal range  There was no evidence for stenosis  There was mild regurgitation  TRICUSPID VALVE: The valve structure was normal  There was normal leaflet  separation  DOPPLER: The transtricuspid velocity was within the normal range  There was no evidence for stenosis  There was trace regurgitation  PULMONIC VALVE: Leaflets exhibited normal thickness, no calcification, and  normal cuspal separation  DOPPLER: The transpulmonic velocity was within the  normal range  There was no significant regurgitation  PERICARDIUM: There was no pericardial effusion  The pericardium was normal in  appearance  AORTA: The root exhibited normal size  SYSTEMIC VEINS: IVC: The inferior vena cava was normal in size   Respirophasic  changes were normal     MEASUREMENT TABLES    OTHER ECHO MEASUREMENTS  (Reference normals)  Estimated CVP   5 mmHg   (--)    SYSTEM MEASUREMENT TABLES    2D mode  AV Diam: 2 9 cm  AoR Diam; Mean (2D): 2 9 cm  LA Diam (2D): 3 4 cm  LA Dimension; Mean (2D): 3 4 cm  LA/Ao (2D): 1 17  EDV (2D-Cubed): 113 cm3  EF (2D-Cubed): 71 %  ESV (2D-Cubed): 32 8 cm3  FS (2D-Cubed): 33 7 %  FS (2D-Teich): 33 7 %  IVS/LVPW (2D): 1 66  IVSd (2D): 1 61 cm  IVSd; Mean chosen (2D): 1 61 cm  LVIDd (2D): 4 83 cm  LVIDd; Mean (2D): 4 83 cm  LVIDs (2D): 3 2 cm  LVIDs; Mean (2D): 3 2 cm  LVPWd (2D): 0 97 cm  LVPWd; Mean (2D): 0 97 cm  Left Ventricular Ejection Fraction; Teichholz; 2D mode;: 62 4 %  Left Ventricular End Diastolic Volume; Teichholz; 2D mode;: 109 cm3  Left Ventricular End Systolic Volume; Teichholz; 2D mode;: 41 cm3  SV (2D-Cubed): 80 2 cm3  Stroke Volume; Teichholz; 2D mode;: 68 cm3    Apical four chamber  LV MOD Diam; Recent value; End Diastole (A4C): 2 83 cm  LV MOD Diam; Recent value; End Diastole (A4C): 5 3 cm  LV MOD Diam; Recent value; End Diastole (A4C): 4 6 cm  LV MOD Diam; Recent value; End Diastole (A4C): 5 51 cm  LV MOD Diam; Recent value; End Diastole (A4C): 5 57 cm  LV MOD Diam; Recent value; End Diastole (A4C): 5 41 cm  LV MOD Diam; Recent value; End Diastole (A4C): 4 73 cm  LV MOD Diam; Recent value; End Diastole (A4C): 2 39 cm  LV MOD Diam; Recent value; End Diastole (A4C): 3 02 cm  LV MOD Diam; Recent value; End Diastole (A4C): 3 38 cm  LV MOD Diam; Recent value; End Diastole (A4C): 3 5 cm  LV MOD Diam; Recent value; End Diastole (A4C): 3 65 cm  LV MOD Diam; Recent value; End Diastole (A4C): 3 86 cm  LV MOD Diam; Recent value; End Diastole (A4C): 4 03 cm  LV MOD Diam; Recent value; End Diastole (A4C): 4 15 cm  LV MOD Diam; Recent value; End Diastole (A4C): 4 31 cm  LV MOD Diam; Recent value; End Diastole (A4C): 4 41 cm  LV MOD Diam; Recent value; End Diastole (A4C): 5 11 cm  LV MOD Diam; Recent value; End Diastole (A4C): 5 47 cm  LV MOD Diam; Recent value;  End Diastole (A4C): 5 56 cm  LV MOD Diam; Recent value; End Systole (A4C): 2 36 cm  LV MOD Diam; Recent value; End Systole (A4C): 3 83 cm  LV MOD Diam; Recent value; End Systole (A4C): 3 79 cm  LV MOD Diam; Recent value; End Systole (A4C): 3 73 cm  LV MOD Diam; Recent value; End Systole (A4C): 3 36 cm  LV MOD Diam; Recent value; End Systole (A4C): 2 15 cm  LV MOD Diam; Recent value; End Systole (A4C): 2 46 cm  LV MOD Diam; Recent value; End Systole (A4C): 2 71 cm  LV MOD Diam; Recent value; End Systole (A4C): 2 86 cm  LV MOD Diam; Recent value; End Systole (A4C): 2 98 cm  LV MOD Diam; Recent value; End Systole (A4C): 3 08 cm  LV MOD Diam; Recent value; End Systole (A4C): 3 08 cm  LV MOD Diam; Recent value; End Systole (A4C): 3 2 cm  LV MOD Diam; Recent value; End Systole (A4C): 3 29 cm  LV MOD Diam; Recent value; End Systole (A4C): 1 03 cm  LV MOD Diam; Recent value; End Systole (A4C): 1 46 cm  LV MOD Diam; Recent value; End Systole (A4C): 1 87 cm  LV MOD Diam; Recent value; End Systole (A4C): 3 51 cm  LV MOD Diam; Recent value; End Systole (A4C): 3 8 cm  LV MOD Diam; Recent value; End Systole (A4C): 3 86 cm  LVEF MOD A4C: 62 5 %  Left Ventricle diastolic major axis; Most recent value chosen; Method of Disks,  Single Plane; 2D mode; Apical four chamber;: 8 14 cm Left Ventricle systolic  major axis; Most recent value chosen; Method of Disks, Single Plane; 2D mode; Apical four chamber;: 6 56 cm Left Ventricular Diastolic Area; Most recent  value chosen; Method of Disks, Single Plane; 2D mode; Apical four chamber;:  3590 mm2 Left Ventricular End Diastolic Volume; Most recent value chosen;  Method of Disks, Single Plane; 2D mode; Apical four chamber;: 126 cm3 Left  Ventricular End Systolic Volume; Most recent value chosen; Method of Disks,  Single Plane; 2D mode; Apical four chamber;: 47 2 cm3 Left Ventricular Systolic  Area; Most recent value chosen; Method of Disks, Single Plane; 2D mode;  Apical  four chamber;: 1920 mm2  SV MOD A4C: 78 8 cm3    Tissue Doppler Imaging  LV Peak Early Lemus Tissue Jude; Medial MA (TDI): 74 mm/s  Left Ventricular Peak Early Diastolic Tissue Velocity; Mean; Mean value chosen;  Medial Mitral Annulus; Tissue Doppler Imaging;: 74 mm/s    Unspecified Scan Mode  Dec Juana Diaz; Mean; Regurgitant Flow: 1500 mm/s2  Dec Juana Diaz; Regurgitant Flow: 1500 mm/s2  PHT; Mean; Regurgitant Flow: 571 ms  PHT; Regurgitant Flow: 571 ms  Peak Grad; Mean; Regurgitant Flow: 34 mm[Hg]  Vmax; Mean; Regurgitant Flow: 2930 mm/s  Vmax; Regurgitant Flow: 2930 mm/s  MV Peak Jude/LV Peak Tissue Jude E-Wave; Medial MA: 8 6  Dec Juana Diaz; Antegrade Flow: 3030 mm/s2  Dec Juana Diaz; Mean; Antegrade Flow: 3030 mm/s2  MV A Jude: 1120 mm/s  MV E Jude: 640 mm/s  MV E/A Ratio: 0 6  MV Peak A Jude: 1120 mm/s  MV Peak E Jude; Mean; Antegrade Flow: 640 mm/s  Peak Grad; Mean; Regurgitant Flow: 26 mm[Hg]  Vmax; Mean; Regurgitant Flow: 2540 mm/s  Vmax; Regurgitant Flow: 2960 mm/s  Vmax; Regurgitant Flow: 2120 mm/s  Distance;: 3 2 cm  Distance; Mean; Mean value chosen;: 3 2 cm    [facility]    Prepared and electronically signed by    Juan Alfonso MD  Signed 16-Oct-2015 09:50:40       No results found for this or any previous visit  Catheterizations:   No results found for this or any previous visit  Stress Tests:  No results found for this or any previous visit  Holter monitor -   Event monitor 7/18/19-8/16/19  Total days monitor -11  Total cut strip count 25; symptomatic 2; asymptomatic 23  Average heart rate of 66 beats per minute; low heart rate of 50 beats per minute; high heart rate of 164 beats per minute  She had 2 episodes of heart racing which shows SVT with heart rate of 164 beats per minute occurring on 07/21/2019 at 7:30 p m and the 2nd episode occurred on 07/22/2019 at 7:30 a m  with maximum heart rate of 163 beats per minute      There was a 3rd SVT episode that did not have any symptoms associated with it  It occurred on 07/23/2019 at 7:41 p m  maximum heart rate was 157 beats per minute  ASSESSMENT:  #Diabetes  #Hyperlipidemia  #Hypertension  #Left bundle branch block  #Palpitations    SUMMARY:    In summary, Ms Erwin Hoyos is a 70 y o  woman with history of diabetes, hyperlipidemia, hypertension, left bundle branch block who has been having palpitations for several years that are now increasing in frequency  She has been terminating the episode with Valsalva maneuver or having a bowel movement  She is quite symptomatic during these palpitation episodes and would prefer to have definitive therapy  Her event monitor shows she has SVT episodes that are occurring at heart rate of 164 beats per minute  She has a baseline left bundle branch block so it is difficult to discern which type of SVT it is  We discussed multiple mechanism of SVT including atrial tachycardia, av susan reentrant tachycardia and AV reentrant tachycardia  On ECG she does not seem to have any accessory pathway  We discussed in detail multiple options including antiarrhythmic medication versus ablation therapy  After discussing the risk and benefits in detail of each option and she opted for ablation  Our office will be in touch with her to schedule the procedure  She will continue all of her medication except stopping metoprolol day before the procedure  She is welcome to call our office with any questions      PLAN:  #Schedule EPS plus SVT ablation  #Hold metoprolol starting day before the procedure

## 2019-09-04 LAB
LEFT EYE DIABETIC RETINOPATHY: NORMAL
RIGHT EYE DIABETIC RETINOPATHY: NORMAL

## 2019-09-17 ENCOUNTER — TELEPHONE (OUTPATIENT)
Dept: INPATIENT UNIT | Facility: HOSPITAL | Age: 79
End: 2019-09-17

## 2019-09-18 ENCOUNTER — ANESTHESIA EVENT (OUTPATIENT)
Dept: NON INVASIVE DIAGNOSTICS | Facility: HOSPITAL | Age: 79
End: 2019-09-18
Payer: MEDICARE

## 2019-09-18 ENCOUNTER — HOSPITAL ENCOUNTER (OUTPATIENT)
Dept: NON INVASIVE DIAGNOSTICS | Facility: HOSPITAL | Age: 79
Discharge: HOME | End: 2019-09-18
Attending: INTERNAL MEDICINE | Admitting: INTERNAL MEDICINE
Payer: MEDICARE

## 2019-09-18 ENCOUNTER — ANESTHESIA (OUTPATIENT)
Dept: NON INVASIVE DIAGNOSTICS | Facility: HOSPITAL | Age: 79
End: 2019-09-18
Payer: MEDICARE

## 2019-09-18 VITALS
RESPIRATION RATE: 18 BRPM | BODY MASS INDEX: 39.15 KG/M2 | HEART RATE: 63 BPM | DIASTOLIC BLOOD PRESSURE: 59 MMHG | SYSTOLIC BLOOD PRESSURE: 128 MMHG | WEIGHT: 235 LBS | OXYGEN SATURATION: 95 % | HEIGHT: 65 IN

## 2019-09-18 DIAGNOSIS — I47.1 PAROXYSMAL SVT (SUPRAVENTRICULAR TACHYCARDIA) (HCC): ICD-10-CM

## 2019-09-18 LAB
ANION GAP SERPL CALCULATED.3IONS-SCNC: 4 MMOL/L (ref 4–13)
ATRIAL RATE: 73 BPM
BASOPHILS # BLD AUTO: 0.06 THOUSANDS/ΜL (ref 0–0.1)
BASOPHILS NFR BLD AUTO: 1 % (ref 0–1)
BUN SERPL-MCNC: 25 MG/DL (ref 5–25)
CALCIUM SERPL-MCNC: 8.9 MG/DL (ref 8.3–10.1)
CHLORIDE SERPL-SCNC: 104 MMOL/L (ref 100–108)
CO2 SERPL-SCNC: 29 MMOL/L (ref 21–32)
CREAT SERPL-MCNC: 0.99 MG/DL (ref 0.6–1.3)
EOSINOPHIL # BLD AUTO: 0.15 THOUSAND/ΜL (ref 0–0.61)
EOSINOPHIL NFR BLD AUTO: 2 % (ref 0–6)
ERYTHROCYTE [DISTWIDTH] IN BLOOD BY AUTOMATED COUNT: 13.2 % (ref 11.6–15.1)
GFR SERPL CREATININE-BSD FRML MDRD: 55 ML/MIN/1.73SQ M
GLUCOSE P FAST SERPL-MCNC: 162 MG/DL (ref 65–99)
GLUCOSE SERPL-MCNC: 162 MG/DL (ref 65–140)
HCT VFR BLD AUTO: 41 % (ref 34.8–46.1)
HGB BLD-MCNC: 13.3 G/DL (ref 11.5–15.4)
IMM GRANULOCYTES # BLD AUTO: 0.02 THOUSAND/UL (ref 0–0.2)
IMM GRANULOCYTES NFR BLD AUTO: 0 % (ref 0–2)
INR PPP: 1.04 (ref 0.84–1.19)
LYMPHOCYTES # BLD AUTO: 1.79 THOUSANDS/ΜL (ref 0.6–4.47)
LYMPHOCYTES NFR BLD AUTO: 25 % (ref 14–44)
MCH RBC QN AUTO: 29.4 PG (ref 26.8–34.3)
MCHC RBC AUTO-ENTMCNC: 32.4 G/DL (ref 31.4–37.4)
MCV RBC AUTO: 91 FL (ref 82–98)
MONOCYTES # BLD AUTO: 0.5 THOUSAND/ΜL (ref 0.17–1.22)
MONOCYTES NFR BLD AUTO: 7 % (ref 4–12)
NEUTROPHILS # BLD AUTO: 4.73 THOUSANDS/ΜL (ref 1.85–7.62)
NEUTS SEG NFR BLD AUTO: 65 % (ref 43–75)
NRBC BLD AUTO-RTO: 0 /100 WBCS
P AXIS: 72 DEGREES
PLATELET # BLD AUTO: 189 THOUSANDS/UL (ref 149–390)
PMV BLD AUTO: 10.1 FL (ref 8.9–12.7)
POTASSIUM SERPL-SCNC: 3.6 MMOL/L (ref 3.5–5.3)
PR INTERVAL: 152 MS
PROTHROMBIN TIME: 13.2 SECONDS (ref 11.6–14.5)
QRS AXIS: -27 DEGREES
QRSD INTERVAL: 142 MS
QT INTERVAL: 446 MS
QTC INTERVAL: 491 MS
RBC # BLD AUTO: 4.53 MILLION/UL (ref 3.81–5.12)
SODIUM SERPL-SCNC: 137 MMOL/L (ref 136–145)
T WAVE AXIS: 80 DEGREES
VENTRICULAR RATE: 73 BPM
WBC # BLD AUTO: 7.25 THOUSAND/UL (ref 4.31–10.16)

## 2019-09-18 PROCEDURE — 85025 COMPLETE CBC W/AUTO DIFF WBC: CPT | Performed by: PHYSICIAN ASSISTANT

## 2019-09-18 PROCEDURE — 93653 COMPRE EP EVAL TX SVT: CPT | Performed by: INTERNAL MEDICINE

## 2019-09-18 PROCEDURE — C1894 INTRO/SHEATH, NON-LASER: HCPCS | Performed by: INTERNAL MEDICINE

## 2019-09-18 PROCEDURE — 85610 PROTHROMBIN TIME: CPT | Performed by: PHYSICIAN ASSISTANT

## 2019-09-18 PROCEDURE — 93623 PRGRMD STIMJ&PACG IV RX NFS: CPT | Performed by: INTERNAL MEDICINE

## 2019-09-18 PROCEDURE — 93621 COMP EP EVL L PAC&REC C SINS: CPT | Performed by: INTERNAL MEDICINE

## 2019-09-18 PROCEDURE — 93010 ELECTROCARDIOGRAM REPORT: CPT | Performed by: INTERNAL MEDICINE

## 2019-09-18 PROCEDURE — 80048 BASIC METABOLIC PNL TOTAL CA: CPT | Performed by: PHYSICIAN ASSISTANT

## 2019-09-18 PROCEDURE — C1733 CATH, EP, OTHR THAN COOL-TIP: HCPCS | Performed by: INTERNAL MEDICINE

## 2019-09-18 PROCEDURE — C1732 CATH, EP, DIAG/ABL, 3D/VECT: HCPCS | Performed by: INTERNAL MEDICINE

## 2019-09-18 PROCEDURE — 93613 INTRACARDIAC EPHYS 3D MAPG: CPT | Performed by: INTERNAL MEDICINE

## 2019-09-18 PROCEDURE — C1893 INTRO/SHEATH, FIXED,NON-PEEL: HCPCS | Performed by: INTERNAL MEDICINE

## 2019-09-18 PROCEDURE — C1730 CATH, EP, 19 OR FEW ELECT: HCPCS | Performed by: INTERNAL MEDICINE

## 2019-09-18 PROCEDURE — 93005 ELECTROCARDIOGRAM TRACING: CPT

## 2019-09-18 RX ORDER — LIDOCAINE HYDROCHLORIDE 10 MG/ML
INJECTION, SOLUTION INFILTRATION; PERINEURAL CODE/TRAUMA/SEDATION MEDICATION
Status: COMPLETED | OUTPATIENT
Start: 2019-09-18 | End: 2019-09-18

## 2019-09-18 RX ORDER — ACETAMINOPHEN 325 MG/1
650 TABLET ORAL EVERY 4 HOURS PRN
Status: DISCONTINUED | OUTPATIENT
Start: 2019-09-18 | End: 2019-09-18 | Stop reason: HOSPADM

## 2019-09-18 RX ORDER — FENTANYL CITRATE 50 UG/ML
INJECTION, SOLUTION INTRAMUSCULAR; INTRAVENOUS AS NEEDED
Status: DISCONTINUED | OUTPATIENT
Start: 2019-09-18 | End: 2019-09-18 | Stop reason: SURG

## 2019-09-18 RX ORDER — PROPOFOL 10 MG/ML
INJECTION, EMULSION INTRAVENOUS CONTINUOUS PRN
Status: DISCONTINUED | OUTPATIENT
Start: 2019-09-18 | End: 2019-09-18 | Stop reason: SURG

## 2019-09-18 RX ORDER — SODIUM CHLORIDE 9 MG/ML
INJECTION, SOLUTION INTRAVENOUS CONTINUOUS PRN
Status: DISCONTINUED | OUTPATIENT
Start: 2019-09-18 | End: 2019-09-18 | Stop reason: SURG

## 2019-09-18 RX ORDER — FENTANYL CITRATE/PF 50 MCG/ML
25 SYRINGE (ML) INJECTION
Status: CANCELLED | OUTPATIENT
Start: 2019-09-18

## 2019-09-18 RX ORDER — ONDANSETRON 2 MG/ML
4 INJECTION INTRAMUSCULAR; INTRAVENOUS ONCE AS NEEDED
Status: CANCELLED | OUTPATIENT
Start: 2019-09-18

## 2019-09-18 RX ADMIN — PROPOFOL 50 MCG/KG/MIN: 10 INJECTION, EMULSION INTRAVENOUS at 08:31

## 2019-09-18 RX ADMIN — LIDOCAINE HYDROCHLORIDE 9 ML: 10 INJECTION, SOLUTION INFILTRATION; PERINEURAL at 08:54

## 2019-09-18 RX ADMIN — ISOPROTERENOL HYDROCHLORIDE 2 MCG/MIN: 0.2 INJECTION, SOLUTION INTRAMUSCULAR; INTRAVENOUS at 10:01

## 2019-09-18 RX ADMIN — FENTANYL CITRATE 50 MCG: 50 INJECTION, SOLUTION INTRAMUSCULAR; INTRAVENOUS at 08:31

## 2019-09-18 RX ADMIN — FENTANYL CITRATE 25 MCG: 50 INJECTION, SOLUTION INTRAMUSCULAR; INTRAVENOUS at 10:15

## 2019-09-18 RX ADMIN — SODIUM CHLORIDE: 0.9 INJECTION, SOLUTION INTRAVENOUS at 08:21

## 2019-09-18 RX ADMIN — FENTANYL CITRATE 25 MCG: 50 INJECTION, SOLUTION INTRAMUSCULAR; INTRAVENOUS at 09:58

## 2019-09-18 NOTE — INTERVAL H&P NOTE
Please refer to Dr Bernadette Gaines full consultation from 8/26/2019 for further detail  In short, MARTA is a 70year old female with palpitations, essential hypertension, hyperlipidemia, LBBB, and diabetes  She was seen due to her palpitations as they had been increasing in frequency  She continued to have them despite beta blocker therapy and they were only relieved with straining of bowel movements  Holter monitor in July of 2019 showed SVT at the time of her symptoms  Therefore, recommendation was made to undergo SVT ablation for this issue  She was agreeable to this and has held metoprolol a day prior to this procedure      Vitals:    09/18/19 0700   BP: 133/59   Pulse: 72   Resp: 18   SpO2: 95%

## 2019-09-18 NOTE — ANESTHESIA PREPROCEDURE EVALUATION
Review of Systems/Medical History  Patient summary reviewed  Chart reviewed  No history of anesthetic complications     Cardiovascular  Negative cardio ROS Hyperlipidemia, Hypertension , Dysrhythmias , history of PSVT,   Comment: metoprolol,  Pulmonary  Negative pulmonary ROS        GI/Hepatic  Negative GI/hepatic ROS          Negative  ROS        Endo/Other  Negative endo/other ROS Diabetes type 2 Oral agent, History of thyroid disease , hypothyroidism,   Obesity    GYN  Negative gynecology ROS          Hematology  Negative hematology ROS      Musculoskeletal  Negative musculoskeletal ROS Osteoarthritis,        Neurology  Negative neurology ROS      Psychology   Negative psychology ROS          EKG 7/3/19: NSR, LBBB    Holter Monitor 8/16/19:  - Average heart rate of 66 beats per minute; low heart rate of 50 beats per minute; high heart rate of 164 beats per minute      - She had 2 episodes of heart racing which shows SVT with heart rate of 164 beats per minute occurring on 07/21/2019 at 7:30 p m and the 2nd episode occurred on 07/22/2019 at 7:30 a m  with maximum heart rate of 163 beats per minute    TTE 10/14/15: LVEF 55%, septal dyskinesis, grade 1 DD, paradoxical ventricular septal wall motion c/w LBBB, RV fn nl, mild MR, mild AI, trace TR    Recent Results (from the past 1008 hour(s))   CBC and differential    Collection Time: 08/26/19  1:33 PM   Result Value Ref Range    WBC 6 17 4 31 - 10 16 Thousand/uL    RBC 4 50 3 81 - 5 12 Million/uL    Hemoglobin 13 1 11 5 - 15 4 g/dL    Hematocrit 40 9 34 8 - 46 1 %    MCV 91 82 - 98 fL    MCH 29 1 26 8 - 34 3 pg    MCHC 32 0 31 4 - 37 4 g/dL    RDW 13 2 11 6 - 15 1 %    MPV 10 2 8 9 - 12 7 fL    Platelets 086 647 - 426 Thousands/uL    nRBC 0 /100 WBCs    Neutrophils Relative 61 43 - 75 %    Immat GRANS % 0 0 - 2 %    Lymphocytes Relative 30 14 - 44 %    Monocytes Relative 6 4 - 12 %    Eosinophils Relative 2 0 - 6 %    Basophils Relative 1 0 - 1 %    Neutrophils Absolute 3 77 1 85 - 7 62 Thousands/µL    Immature Grans Absolute 0 01 0 00 - 0 20 Thousand/uL    Lymphocytes Absolute 1 83 0 60 - 4 47 Thousands/µL    Monocytes Absolute 0 36 0 17 - 1 22 Thousand/µL    Eosinophils Absolute 0 15 0 00 - 0 61 Thousand/µL    Basophils Absolute 0 05 0 00 - 0 10 Thousands/µL   Comprehensive metabolic panel    Collection Time: 08/26/19  1:33 PM   Result Value Ref Range    Sodium 141 136 - 145 mmol/L    Potassium 3 7 3 5 - 5 3 mmol/L    Chloride 102 100 - 108 mmol/L    CO2 29 21 - 32 mmol/L    ANION GAP 10 4 - 13 mmol/L    BUN 21 5 - 25 mg/dL    Creatinine 1 01 0 60 - 1 30 mg/dL    Glucose 176 (H) 65 - 140 mg/dL    Calcium 9 8 8 3 - 10 1 mg/dL    AST 14 5 - 45 U/L    ALT 21 12 - 78 U/L    Alkaline Phosphatase 66 46 - 116 U/L    Total Protein 7 3 6 4 - 8 2 g/dL    Albumin 3 8 3 5 - 5 0 g/dL    Total Bilirubin 0 42 0 20 - 1 00 mg/dL    eGFR 54 ml/min/1 73sq m    Diabetes Eye Exam    Collection Time: 09/04/19 12:58 PM   Result Value Ref Range    Right Eye Diabetic Retinopathy None     Left Eye Diabetic Retinopathy None        Physical Exam    Airway    Mallampati score: II  TM Distance: >3 FB  Neck ROM: full     Dental   lower dentures and upper dentures,     Cardiovascular  Comment: Negative ROS, Rhythm: regular, Rate: normal, No murmur,     Pulmonary  Breath sounds clear to auscultation,     Other Findings        Anesthesia Plan  ASA Score- 3     Anesthesia Type- IV sedation with anesthesia with ASA Monitors  Additional Monitors:   Airway Plan:         Plan Factors-    Induction-     Postoperative Plan-     Informed Consent- Anesthetic plan and risks discussed with patient  I personally reviewed this patient with the CRNA  Discussed and agreed on the Anesthesia Plan with the CRNA  Taylor Espinoza

## 2019-09-18 NOTE — DISCHARGE INSTRUCTIONS
PLEASE STOP TAKING METOPROLOL  PLEASE HOLD METFORMIN UNTIL Thursday MORNING  No heavy lifting or strenuous activity for one week  No soaking in a bath tub/hot tub/swimming pool for one week or until groin heals  If you notice ongoing bleeding, swelling, or firm lumps in groin near ablation incision, please contact Dr Luis Zarate office - [phone]

## 2019-09-26 DIAGNOSIS — I10 ESSENTIAL HYPERTENSION: ICD-10-CM

## 2019-09-26 RX ORDER — HYDROCHLOROTHIAZIDE 25 MG/1
TABLET ORAL
Qty: 90 TABLET | Refills: 1 | Status: SHIPPED | OUTPATIENT
Start: 2019-09-26 | End: 2020-03-24

## 2019-10-12 DIAGNOSIS — E78.2 MIXED HYPERLIPIDEMIA: ICD-10-CM

## 2019-10-14 DIAGNOSIS — I10 HYPERTENSION, UNSPECIFIED TYPE: ICD-10-CM

## 2019-10-14 RX ORDER — QUINAPRIL 20 MG/1
20 TABLET ORAL DAILY
Qty: 90 TABLET | Refills: 1 | Status: SHIPPED | OUTPATIENT
Start: 2019-10-14 | End: 2020-06-29

## 2019-10-14 RX ORDER — SIMVASTATIN 10 MG
TABLET ORAL
Qty: 90 TABLET | Refills: 1 | Status: SHIPPED | OUTPATIENT
Start: 2019-10-14 | End: 2020-04-06

## 2019-10-21 ENCOUNTER — TELEPHONE (OUTPATIENT)
Dept: FAMILY MEDICINE CLINIC | Facility: MEDICAL CENTER | Age: 79
End: 2019-10-21

## 2019-10-21 NOTE — TELEPHONE ENCOUNTER
Patient mailed in her Placard Application form to be completed and signed by Dr Cal Miller  Placed form on Dr Prabhjot Hughes desramon to review and advise

## 2019-10-22 ENCOUNTER — OFFICE VISIT (OUTPATIENT)
Dept: CARDIOLOGY CLINIC | Facility: CLINIC | Age: 79
End: 2019-10-22
Payer: MEDICARE

## 2019-10-22 VITALS
HEIGHT: 65 IN | BODY MASS INDEX: 39.55 KG/M2 | HEART RATE: 72 BPM | SYSTOLIC BLOOD PRESSURE: 132 MMHG | DIASTOLIC BLOOD PRESSURE: 82 MMHG | WEIGHT: 237.4 LBS

## 2019-10-22 DIAGNOSIS — I47.1 PAROXYSMAL SVT (SUPRAVENTRICULAR TACHYCARDIA) (HCC): Primary | ICD-10-CM

## 2019-10-22 DIAGNOSIS — R01.1 HEART MURMUR ON PHYSICAL EXAMINATION: ICD-10-CM

## 2019-10-22 PROCEDURE — 99213 OFFICE O/P EST LOW 20 MIN: CPT | Performed by: INTERNAL MEDICINE

## 2019-10-22 NOTE — PATIENT INSTRUCTIONS
Obtain echocardiogram and give us a call afterwards to discuss the result  Follow-up with Dr Vaz  at regular times

## 2019-10-22 NOTE — PROGRESS NOTES
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  She has no further palpitations  She denies any dizziness, lightheadedness, groin pain, chest pain, swelling in legs, orthopnea, PND or syncope  ECG performed in the clinic we will sinus rhythm with left bundle-branch block at 72 beats per minute  QRS of 140 millisecond  HPI:  Ms  Abena Villavicencio is a 70 y o  woman with history of diabetes, hyperlipidemia, hypertension, left bundle branch block who initially presented on August 26, 2019 for management of her palpitations      She has had palpitation for several years  She was having increased frequency of her palpitation which were occurring every 2 weeks as opposed to occurring every 2 months  Frequency of palpitation started to increase in February of 2019  She was on metoprolol daily and was taking additional dose p r n  whenever she had palpitations  The palpitations had sudden onset and offset  They were lasting anywhere from 5 minutes to 5 hours  She did not have any associated activities  She had tried to do Valsalva maneuvers either by bending forward taking deep breath and holding it  Most of her episodes had resolved after having a bowel movement  Two week event monitor in October 2015 showed her having occasional PVCs and PACs as well as short episodes of SVT    She had another event monitor during which on July 21st 2018 she had an episode of palpitation  Her event monitor showed she had tachycardia with heart rate up to 164 beats per minute  Her metoprolol dose was increased however she continued to have the episodes  After discussing options she decided to proceed with EP study and SVT ablation       Patient Active Problem List   Diagnosis    Paroxysmal SVT (supraventricular tachycardia) (HCC)    Essential hypertension    Dyslipidemia    LBBB (left bundle branch block)    Type 2 diabetes mellitus without complication, without long-term current use of insulin (HCC)    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)    Morbid obesity with BMI of 40 0-44 9, adult ([facility] )    Acquired hypothyroidism     Past Medical History:   Diagnosis Date    Diabetes mellitus ([facility] )     Disease of thyroid gland     Hyperlipidemia     Hypertension     LBBB (left bundle branch block)     Palpitations      Social History     Socioeconomic History    Marital status: /Civil Union     Spouse name: Not on file    Number of children: Not on file    Years of education: Not on file    Highest education level: Not on file   Occupational History    Not on file   Social Needs    Financial resource strain: Not on file    Food insecurity:     Worry: Not on file     Inability: Not on file    Transportation needs:     Medical: Not on file     Non-medical: Not on file   Tobacco Use    Smoking status: Former Smoker    Smokeless tobacco: Never Used    Tobacco comment: quit 1976   Substance and Sexual Activity    Alcohol use: Not Currently    Drug use: No    Sexual activity: Not on file   Lifestyle    Physical activity:     Days per week: Not on file     Minutes per session: Not on file    Stress: Not on file   Relationships    Social connections:     Talks on phone: Not on file     Gets together: Not on file     Attends L[employer] service: Not on file     Active member of club or organization: Not on file     Attends meetings of clubs or organizations: Not on file     Relationship status: Not on file    Intimate partner violence:     Fear of current or ex partner: Not on file     Emotionally abused: Not on file     Physically abused: Not on file     Forced sexual activity: Not on file   Other Topics Concern    Not on file   Social History Narrative    Caffeine use       Family History   Problem Relation Age of Onset    Arthritis Mother     Other Mother         CABG    Cancer Mother     Heart disease Mother     Osteoporosis Mother     Lung cancer Father     Pancreatic cancer Father      Past Surgical History:   Procedure Laterality Date    APPENDECTOMY       SECTION      HERNIA REPAIR      HIP SURGERY      KNEE ARTHROSCOPY Right     therapeutic     KNEE SURGERY Left     TONSILLECTOMY         Current Outpatient Medications:     Aspirin 81 MG EC tablet, Take 1 tablet by mouth daily, Disp: , Rfl:     Cyanocobalamin (B-12 PO), Take 1 capsule by mouth daily, Disp: , Rfl:     hydrochlorothiazide (HYDRODIURIL) 25 mg tablet, TAKE 1 TABLET BY MOUTH EVERY DAY, Disp: 90 tablet, Rfl: 1    levothyroxine 50 mcg tablet, TAKE 1 TABLET BY MOUTH DAILY, Disp: 90 tablet, Rfl: 1    magnesium Oxide (MAG-OX) 400 mg TABS, Take 1 tablet by mouth daily, Disp: , Rfl:     metFORMIN (GLUCOPHAGE) 850 mg tablet, TAKE 1 TABLET BY MOUTH TWICE A DAY WITH MORNING AND EVENING MEALS, Disp: 180 tablet, Rfl: 1    Multiple Vitamins-Minerals (CENTRUM SILVER 50+WOMEN PO), Take 1 tablet by mouth daily , Disp: , Rfl:     Potassium 99 MG TABS, Take 1 tablet by mouth daily, Disp: , Rfl:     quinapril (ACCUPRIL) 20 mg tablet, Take 1 tablet (20 mg total) by mouth daily, Disp: 90 tablet, Rfl: 1    simvastatin (ZOCOR) 10 mg tablet, TAKE 1 TABLET BY MOUTH EVERY DAY, Disp: 90 tablet, Rfl: 1    VITAMIN E BLEND PO, Take 1 capsule by mouth daily, Disp: , Rfl:   No Known Allergies    Labs:  Lab Results   Component Value Date    NA 141 01/10/2018     07/10/2017     12/28/2016    K 3 6 09/18/2019    K 3 7 08/26/2019    K 4 1 07/03/2019    K 4 4 01/15/2019    K 4 5 07/12/2018    K 4 4 01/10/2018    K 4 5 07/10/2017    K 4 5 12/28/2016    CO2 29 09/18/2019    CO2 29 08/26/2019    CO2 32 07/03/2019    CO2 31 01/15/2019    CO2 31 07/12/2018    CO2 31 01/10/2018    CO2 29 07/10/2017    CO2 31 12/28/2016    BUN 25 09/18/2019    BUN 21 08/26/2019    BUN 20 07/03/2019    BUN 19 01/15/2019    BUN 17 07/12/2018    BUN 20 01/10/2018    BUN 19 07/10/2017    BUN 22 12/28/2016    CREATININE 0 99 09/18/2019    CREATININE 1 01 08/26/2019    CREATININE 0 96 07/03/2019    CREATININE 0 94 (H) 01/10/2018    CREATININE 0 94 (H) 07/10/2017    CREATININE 0 94 (H) 12/28/2016    GLUCOSE 172 (H) 10/14/2015    CALCIUM 8 9 09/18/2019    CALCIUM 9 8 08/26/2019    CALCIUM 9 5 07/03/2019    CALCIUM 9 6 01/15/2019    CALCIUM 9 6 07/12/2018    CALCIUM 9 4 01/10/2018    CALCIUM 9 6 07/10/2017    CALCIUM 9 4 12/28/2016     Lab Results   Component Value Date    TROPONINI 0 09 (H) 10/15/2015    TROPONINI 0 16 (H) 10/15/2015     Lab Results   Component Value Date    WBC 7 25 09/18/2019    WBC 6 17 08/26/2019    WBC 7 17 10/14/2015    HGB 13 3 09/18/2019    HGB 13 1 08/26/2019    HGB 13 8 10/14/2015    HCT 41 0 09/18/2019    HCT 40 9 08/26/2019    HCT 41 7 10/14/2015    MCV 91 09/18/2019    MCV 91 08/26/2019    MCV 89 10/14/2015     09/18/2019     08/26/2019     10/14/2015     Lab Results   Component Value Date    CHOL 153 07/10/2017    CHOL 151 06/20/2016    CHOL 158 05/20/2014    TRIG 128 07/03/2019    TRIG 127 07/12/2018    TRIG 132 07/10/2017    TRIG 121 06/20/2016    TRIG 135 05/20/2014    HDL 58 07/03/2019    HDL 52 07/12/2018    HDL 55 07/10/2017    HDL 54 06/20/2016    HDL 54 05/20/2014    LDLDIRECT 87 05/20/2014     Lab Results   Component Value Date    HGBA1C 6 7 (H) 07/03/2019       Imaging: No results found      Review of Systems:  Review of Systems - History obtained from the patient  General ROS: negative for - fatigue, fever or weight loss  Psychological ROS: negative for - anxiety or disorientation  Ophthalmic ROS: negative for - blurry vision or double vision  ENT ROS: negative for - epistaxis  Allergy and Immunology ROS: negative for - hives  Hematological and Lymphatic ROS: negative for - bleeding problems or bruising  Endocrine ROS: negative for - palpitations  Respiratory ROS: no cough, shortness of breath, or wheezing  Cardiovascular ROS: negative for - dyspnea on exertion, palpitations, shortness of breath, edema, loss of consciousness, orthopnea or paroxysmal nocturnal dyspnea  Gastrointestinal ROS: no abdominal pain, change in bowel habits, or black or bloody stools  Genito-Urinary ROS: no dysuria, trouble voiding, or hematuria  Musculoskeletal ROS: negative for - joint pain  Neurological ROS: no TIA or stroke symptoms  Dermatological ROS: negative for rash      Physical Exam:  /82 (BP Location: Left arm, Patient Position: Sitting, Cuff Size: Large)   Pulse 72   Ht 5' 4 5" (1 638 m)   Wt 108 kg (237 lb 6 4 oz)   BMI 40 12 kg/m²    Physical Exam   Constitutional: She is oriented to person, place, and time  She appears well-developed and well-nourished  No distress  HENT:   Head: Normocephalic and atraumatic  Mouth/Throat: No oropharyngeal exudate  Eyes: Pupils are equal, round, and reactive to light  EOM are normal  Left eye exhibits no discharge  Neck: Normal range of motion  Neck supple  No thyromegaly present  Cardiovascular: Normal rate and regular rhythm  Exam reveals no gallop and no friction rub  Murmur (diastolic murmur, left sternal border) heard  Pulmonary/Chest: Effort normal and breath sounds normal  No respiratory distress  Abdominal: Soft  Bowel sounds are normal  She exhibits no distension  There is no tenderness  Musculoskeletal: Normal range of motion  She exhibits no edema     Neurological: She is alert and oriented to person, place, and time  Skin: Skin is warm and dry  No erythema  No pallor  Psychiatric: She has a normal mood and affect  Her behavior is normal        Discussion/Summary:    Ms Jacinda Colunga is a 70 y  o  woman with history of diabetes, hyperlipidemia, hypertension, left bundle branch block who initially presented on August 26, 2019 for management of her palpitations  She had SVT diagnosed on cardiac monitor  She underwent EP study which showed AVNRT and underwent successful modification of slow pathway  She has done well since the procedure  On exam, she had a murmur  Will obtain echocardiogram to rule out valvular disease  1  Palpitations  -diagnosis SVT on cardiac monitors  -underwent EP study on 09/18/2019 showing AV susan reentry tachycardia  -status post successful slow pathway modification  -uneventful postop course  -no recurrence  -continue to monitor for symptoms    2  Hypertension  -continue hydrochlorothiazide and quinapril  -normotensive in the office    3  Hyperlipidemia  -lipids within normal range  -continue simvastatin    4   Diabetes  -last A1c 6 7  -continue metformin    Follow-up as needed

## 2019-10-24 ENCOUNTER — HOSPITAL ENCOUNTER (OUTPATIENT)
Dept: NON INVASIVE DIAGNOSTICS | Facility: CLINIC | Age: 79
Discharge: HOME | End: 2019-10-24
Payer: MEDICARE

## 2019-10-24 DIAGNOSIS — R01.1 HEART MURMUR ON PHYSICAL EXAMINATION: ICD-10-CM

## 2019-10-24 DIAGNOSIS — I47.1 PAROXYSMAL SVT (SUPRAVENTRICULAR TACHYCARDIA) (HCC): ICD-10-CM

## 2019-10-24 PROCEDURE — 93325 DOPPLER ECHO COLOR FLOW MAPG: CPT | Performed by: INTERNAL MEDICINE

## 2019-10-24 PROCEDURE — 93308 TTE F-UP OR LMTD: CPT | Performed by: INTERNAL MEDICINE

## 2019-10-24 PROCEDURE — 93321 DOPPLER ECHO F-UP/LMTD STD: CPT | Performed by: INTERNAL MEDICINE

## 2019-10-24 PROCEDURE — 93000 ELECTROCARDIOGRAM COMPLETE: CPT | Performed by: INTERNAL MEDICINE

## 2019-10-24 PROCEDURE — C8929 TTE W OR WO FOL WCON,DOPPLER: HCPCS

## 2019-10-24 RX ADMIN — PERFLUTREN 1.2 ML/MIN: 6.52 INJECTION, SUSPENSION INTRAVENOUS at 10:18

## 2019-10-24 NOTE — TELEPHONE ENCOUNTER
Form complete, scanned in chart, and mailed to [facility] in envelope pt supplied

## 2019-10-25 ENCOUNTER — TELEPHONE (OUTPATIENT)
Dept: NON INVASIVE DIAGNOSTICS | Facility: HOSPITAL | Age: 79
End: 2019-10-25

## 2020-01-08 DIAGNOSIS — E11.9 TYPE 2 DIABETES MELLITUS WITHOUT COMPLICATION, WITHOUT LONG-TERM CURRENT USE OF INSULIN (HCC): ICD-10-CM

## 2020-02-04 ENCOUNTER — OFFICE VISIT (OUTPATIENT)
Dept: FAMILY MEDICINE CLINIC | Facility: MEDICAL CENTER | Age: 80
End: 2020-02-04
Payer: MEDICARE

## 2020-02-04 VITALS
HEIGHT: 65 IN | HEART RATE: 80 BPM | WEIGHT: 233.25 LBS | RESPIRATION RATE: 16 BRPM | SYSTOLIC BLOOD PRESSURE: 130 MMHG | BODY MASS INDEX: 38.86 KG/M2 | DIASTOLIC BLOOD PRESSURE: 66 MMHG

## 2020-02-04 DIAGNOSIS — I47.1 PAROXYSMAL SVT (SUPRAVENTRICULAR TACHYCARDIA) (HCC): ICD-10-CM

## 2020-02-04 DIAGNOSIS — E78.5 DYSLIPIDEMIA: ICD-10-CM

## 2020-02-04 DIAGNOSIS — E66.01 MORBID OBESITY WITH BMI OF 40.0-44.9, ADULT (HCC): ICD-10-CM

## 2020-02-04 DIAGNOSIS — I10 ESSENTIAL HYPERTENSION: ICD-10-CM

## 2020-02-04 DIAGNOSIS — E11.3293 TYPE 2 DIABETES MELLITUS WITH BOTH EYES AFFECTED BY MILD NONPROLIFERATIVE RETINOPATHY WITHOUT MACULAR EDEMA, WITHOUT LONG-TERM CURRENT USE OF INSULIN (HCC): ICD-10-CM

## 2020-02-04 DIAGNOSIS — E03.9 ACQUIRED HYPOTHYROIDISM: ICD-10-CM

## 2020-02-04 DIAGNOSIS — Z12.11 SCREENING FOR MALIGNANT NEOPLASM OF COLON: ICD-10-CM

## 2020-02-04 DIAGNOSIS — L81.9 CHANGE IN PIGMENTED SKIN LESION OF FACE: ICD-10-CM

## 2020-02-04 DIAGNOSIS — E11.9 TYPE 2 DIABETES MELLITUS WITHOUT COMPLICATION, WITHOUT LONG-TERM CURRENT USE OF INSULIN (HCC): Primary | ICD-10-CM

## 2020-02-04 LAB — SL AMB POCT HEMOGLOBIN AIC: 6.8 (ref ?–6.5)

## 2020-02-04 PROCEDURE — 3044F HG A1C LEVEL LT 7.0%: CPT | Performed by: FAMILY MEDICINE

## 2020-02-04 PROCEDURE — 3078F DIAST BP <80 MM HG: CPT | Performed by: FAMILY MEDICINE

## 2020-02-04 PROCEDURE — 99214 OFFICE O/P EST MOD 30 MIN: CPT | Performed by: FAMILY MEDICINE

## 2020-02-04 PROCEDURE — 4040F PNEUMOC VAC/ADMIN/RCVD: CPT | Performed by: FAMILY MEDICINE

## 2020-02-04 PROCEDURE — 83036 HEMOGLOBIN GLYCOSYLATED A1C: CPT | Performed by: FAMILY MEDICINE

## 2020-02-04 PROCEDURE — 3075F SYST BP GE 130 - 139MM HG: CPT | Performed by: FAMILY MEDICINE

## 2020-02-04 PROCEDURE — 2022F DILAT RTA XM EVC RTNOPTHY: CPT | Performed by: FAMILY MEDICINE

## 2020-02-04 PROCEDURE — 1160F RVW MEDS BY RX/DR IN RCRD: CPT | Performed by: FAMILY MEDICINE

## 2020-02-04 PROCEDURE — 1036F TOBACCO NON-USER: CPT | Performed by: FAMILY MEDICINE

## 2020-02-04 NOTE — PROGRESS NOTES
Assessment/Plan:      Diagnoses and all orders for this visit:    Type 2 diabetes mellitus without complication, without long-term current use of insulin (HCC)  -     POCT hemoglobin A1c    Change in pigmented skin lesion of face  -     Ambulatory referral to Dermatology; Future    Type 2 diabetes mellitus with both eyes affected by mild nonproliferative retinopathy without macular edema, without long-term current use of insulin (HCC)  -     Comprehensive metabolic panel; Future  -     Hemoglobin A1C; Future  -     Microalbumin / creatinine urine ratio; Future    Acquired hypothyroidism  -     TSH, 3rd generation with Free T4 reflex; Future    Essential hypertension  -     Comprehensive metabolic panel; Future    Dyslipidemia  -     Lipid Panel with Direct LDL reflex; Future    Screening for malignant neoplasm of colon  -     Occult Blood, Fecal Immunochemical; Future          Subjective:     Patient ID: Abena Lantigua is a 70 y o  female  T2DM    A1c 6 8  Doing well with the metformin and dietary compliance  Encouraged weight loss and exercise  Will recheck in six months  HTN    Patient's blood pressure is currently well controlled  She takes Accupril and hydrochlorothiazide  Continue these medications, low-salt diet  Weight loss and exercise  Hyperlipid    Patient currently takes simvastatin  She is due for a lipid profile will order that  Continue low-fat diet, simvastatin    SVT    S/p Ablation, currently no symptoms and no recurrence of SVT  Systolic Murmur    Echocardiogram done in October was basically normal except for some aortic sclerosis  Good ejection fraction  She will continue following with Cardiology    Past medical history, past surgical history, family medical history, social history, and medication list were all reviewed  Review of Systems   Constitutional: Negative for activity change, appetite change, fatigue and fever     HENT: Negative for congestion, ear pain, hearing loss, nosebleeds, postnasal drip, rhinorrhea and trouble swallowing  Eyes: Negative for photophobia, pain, redness and visual disturbance  Respiratory: Negative for cough, chest tightness, shortness of breath and wheezing  Cardiovascular: Negative for chest pain and palpitations  Gastrointestinal: Negative for abdominal pain, blood in stool, constipation, diarrhea, nausea and vomiting  Endocrine: Negative for cold intolerance, heat intolerance, polydipsia, polyphagia and polyuria  Genitourinary: Negative for difficulty urinating, dyspareunia, dysuria, flank pain, frequency, menstrual problem, pelvic pain, urgency, vaginal bleeding and vaginal discharge  Musculoskeletal: Negative for arthralgias, gait problem, joint swelling and myalgias  Skin: Negative for rash and wound  Neurological: Negative for dizziness, tremors, seizures, syncope, speech difficulty, weakness, light-headedness and headaches  Psychiatric/Behavioral: Negative for agitation, decreased concentration, dysphoric mood, sleep disturbance and suicidal ideas  The patient is not nervous/anxious  Objective:    /66 (Cuff Size: Large)   Pulse 80   Resp 16   Ht 5' 4 5" (1 638 m)   Wt 106 kg (233 lb 4 oz)   BMI 39 42 kg/m²      Physical Exam   Constitutional: She is oriented to person, place, and time  Vital signs are normal  She appears well-developed and well-nourished  She is cooperative  HENT:   Head: Normocephalic  Right Ear: Hearing, external ear and ear canal normal    Left Ear: Hearing, tympanic membrane, external ear and ear canal normal    Nose: Nose normal  No mucosal edema or rhinorrhea  Mouth/Throat: Uvula is midline, oropharynx is clear and moist and mucous membranes are normal  No oropharyngeal exudate  Eyes: Pupils are equal, round, and reactive to light  Conjunctivae, EOM and lids are normal    Neck: Carotid bruit is not present  No thyroid mass and no thyromegaly present  Cardiovascular: Normal rate, regular rhythm, S1 normal, S2 normal and normal pulses  Exam reveals no gallop  Murmur (Systolic) heard  Pulmonary/Chest: Effort normal and breath sounds normal  She has no wheezes  She has no rales  Abdominal: Soft  Normal appearance, normal aorta and bowel sounds are normal  There is no hepatosplenomegaly  There is no tenderness  There is no CVA tenderness  No hernia  Musculoskeletal: Normal range of motion  Lymphadenopathy:     She has no cervical adenopathy  Neurological: She is oriented to person, place, and time  She has normal strength  No cranial nerve deficit or sensory deficit  Coordination normal    Skin: Skin is warm, dry and intact  No rash noted  Psychiatric: She has a normal mood and affect  Her speech is normal and behavior is normal  Judgment and thought content normal  Cognition and memory are normal    Nursing note and vitals reviewed        See above for assessment and plan for the patient's individual problems on the diagnosis list

## 2020-02-19 DIAGNOSIS — E03.9 HYPOTHYROIDISM, UNSPECIFIED TYPE: ICD-10-CM

## 2020-02-19 RX ORDER — LEVOTHYROXINE SODIUM 0.05 MG/1
TABLET ORAL
Qty: 90 TABLET | Refills: 1 | Status: SHIPPED | OUTPATIENT
Start: 2020-02-19 | End: 2020-08-17

## 2020-03-24 DIAGNOSIS — I10 ESSENTIAL HYPERTENSION: ICD-10-CM

## 2020-03-24 RX ORDER — HYDROCHLOROTHIAZIDE 25 MG/1
TABLET ORAL
Qty: 90 TABLET | Refills: 1 | Status: SHIPPED | OUTPATIENT
Start: 2020-03-24 | End: 2020-09-23

## 2020-04-03 DIAGNOSIS — E11.9 TYPE 2 DIABETES MELLITUS WITHOUT COMPLICATION, WITHOUT LONG-TERM CURRENT USE OF INSULIN (HCC): ICD-10-CM

## 2020-04-06 DIAGNOSIS — E78.2 MIXED HYPERLIPIDEMIA: ICD-10-CM

## 2020-04-06 RX ORDER — SIMVASTATIN 10 MG
TABLET ORAL
Qty: 90 TABLET | Refills: 1 | Status: SHIPPED | OUTPATIENT
Start: 2020-04-06 | End: 2020-10-01

## 2020-05-13 ENCOUNTER — TELEMEDICINE (OUTPATIENT)
Dept: DERMATOLOGY | Facility: CLINIC | Age: 80
End: 2020-05-13
Payer: MEDICARE

## 2020-05-13 DIAGNOSIS — Z13.89 SCREENING FOR SKIN CONDITION: ICD-10-CM

## 2020-05-13 DIAGNOSIS — L82.1 SEBORRHEIC KERATOSIS: Primary | ICD-10-CM

## 2020-05-13 PROCEDURE — 99201 PR OFFICE OUTPATIENT NEW 10 MINUTES: CPT | Performed by: DERMATOLOGY

## 2020-06-29 DIAGNOSIS — I10 HYPERTENSION, UNSPECIFIED TYPE: ICD-10-CM

## 2020-06-29 RX ORDER — QUINAPRIL 20 MG/1
TABLET ORAL
Qty: 90 TABLET | Refills: 1 | Status: SHIPPED | OUTPATIENT
Start: 2020-06-29 | End: 2020-12-22

## 2020-07-08 LAB
ALBUMIN SERPL-MCNC: 4.1 G/DL (ref 3.6–5.1)
ALBUMIN/CREAT UR: 8 MCG/MG CREAT
ALBUMIN/GLOB SERPL: 1.8 (CALC) (ref 1–2.5)
ALP SERPL-CCNC: 55 U/L (ref 37–153)
ALT SERPL-CCNC: 10 U/L (ref 6–29)
AST SERPL-CCNC: 15 U/L (ref 10–35)
BILIRUB SERPL-MCNC: 0.5 MG/DL (ref 0.2–1.2)
BUN SERPL-MCNC: 21 MG/DL (ref 7–25)
BUN/CREAT SERPL: 22 (CALC) (ref 6–22)
CALCIUM SERPL-MCNC: 9.7 MG/DL (ref 8.6–10.4)
CHLORIDE SERPL-SCNC: 101 MMOL/L (ref 98–110)
CHOLEST SERPL-MCNC: 161 MG/DL
CHOLEST/HDLC SERPL: 2.6 (CALC)
CO2 SERPL-SCNC: 31 MMOL/L (ref 20–32)
CREAT SERPL-MCNC: 0.95 MG/DL (ref 0.6–0.93)
CREAT UR-MCNC: 100 MG/DL (ref 20–275)
GLOBULIN SER CALC-MCNC: 2.3 G/DL (CALC) (ref 1.9–3.7)
GLUCOSE SERPL-MCNC: 133 MG/DL (ref 65–99)
HBA1C MFR BLD: 6.7 % OF TOTAL HGB
HDLC SERPL-MCNC: 62 MG/DL
LDLC SERPL CALC-MCNC: 79 MG/DL (CALC)
MICROALBUMIN UR-MCNC: 0.8 MG/DL
NONHDLC SERPL-MCNC: 99 MG/DL (CALC)
POTASSIUM SERPL-SCNC: 4.1 MMOL/L (ref 3.5–5.3)
PROT SERPL-MCNC: 6.4 G/DL (ref 6.1–8.1)
SL AMB EGFR AFRICAN AMERICAN: 66 ML/MIN/1.73M2
SL AMB EGFR NON AFRICAN AMERICAN: 57 ML/MIN/1.73M2
SODIUM SERPL-SCNC: 140 MMOL/L (ref 135–146)
TRIGL SERPL-MCNC: 118 MG/DL
TSH SERPL-ACNC: 0.97 MIU/L (ref 0.4–4.5)

## 2020-07-30 DIAGNOSIS — E11.9 TYPE 2 DIABETES MELLITUS WITHOUT COMPLICATION, WITHOUT LONG-TERM CURRENT USE OF INSULIN (HCC): ICD-10-CM

## 2020-08-04 ENCOUNTER — OFFICE VISIT (OUTPATIENT)
Dept: FAMILY MEDICINE CLINIC | Facility: MEDICAL CENTER | Age: 80
End: 2020-08-04
Payer: MEDICARE

## 2020-08-04 VITALS
DIASTOLIC BLOOD PRESSURE: 68 MMHG | BODY MASS INDEX: 39.32 KG/M2 | TEMPERATURE: 95.4 F | HEIGHT: 65 IN | WEIGHT: 236 LBS | OXYGEN SATURATION: 96 % | SYSTOLIC BLOOD PRESSURE: 132 MMHG | HEART RATE: 83 BPM

## 2020-08-04 DIAGNOSIS — E78.5 DYSLIPIDEMIA: ICD-10-CM

## 2020-08-04 DIAGNOSIS — E03.9 ACQUIRED HYPOTHYROIDISM: ICD-10-CM

## 2020-08-04 DIAGNOSIS — Z00.00 MEDICARE ANNUAL WELLNESS VISIT, SUBSEQUENT: Primary | ICD-10-CM

## 2020-08-04 DIAGNOSIS — I47.1 PAROXYSMAL SVT (SUPRAVENTRICULAR TACHYCARDIA) (HCC): ICD-10-CM

## 2020-08-04 DIAGNOSIS — N18.30 STAGE 3 CHRONIC KIDNEY DISEASE (HCC): ICD-10-CM

## 2020-08-04 DIAGNOSIS — E11.9 TYPE 2 DIABETES MELLITUS WITHOUT COMPLICATION, WITHOUT LONG-TERM CURRENT USE OF INSULIN (HCC): ICD-10-CM

## 2020-08-04 DIAGNOSIS — I10 ESSENTIAL HYPERTENSION: ICD-10-CM

## 2020-08-04 PROCEDURE — 3078F DIAST BP <80 MM HG: CPT | Performed by: FAMILY MEDICINE

## 2020-08-04 PROCEDURE — 99214 OFFICE O/P EST MOD 30 MIN: CPT | Performed by: FAMILY MEDICINE

## 2020-08-04 PROCEDURE — G0439 PPPS, SUBSEQ VISIT: HCPCS | Performed by: FAMILY MEDICINE

## 2020-08-04 PROCEDURE — 1125F AMNT PAIN NOTED PAIN PRSNT: CPT | Performed by: FAMILY MEDICINE

## 2020-08-04 PROCEDURE — 3044F HG A1C LEVEL LT 7.0%: CPT | Performed by: FAMILY MEDICINE

## 2020-08-04 PROCEDURE — 4040F PNEUMOC VAC/ADMIN/RCVD: CPT | Performed by: FAMILY MEDICINE

## 2020-08-04 PROCEDURE — 1170F FXNL STATUS ASSESSED: CPT | Performed by: FAMILY MEDICINE

## 2020-08-04 PROCEDURE — 1160F RVW MEDS BY RX/DR IN RCRD: CPT | Performed by: FAMILY MEDICINE

## 2020-08-04 PROCEDURE — 1036F TOBACCO NON-USER: CPT | Performed by: FAMILY MEDICINE

## 2020-08-04 PROCEDURE — 3066F NEPHROPATHY DOC TX: CPT | Performed by: FAMILY MEDICINE

## 2020-08-04 PROCEDURE — 3075F SYST BP GE 130 - 139MM HG: CPT | Performed by: FAMILY MEDICINE

## 2020-08-04 PROCEDURE — 1123F ACP DISCUSS/DSCN MKR DOCD: CPT | Performed by: FAMILY MEDICINE

## 2020-08-04 PROCEDURE — 2022F DILAT RTA XM EVC RTNOPTHY: CPT | Performed by: FAMILY MEDICINE

## 2020-08-04 PROCEDURE — 3008F BODY MASS INDEX DOCD: CPT | Performed by: FAMILY MEDICINE

## 2020-08-04 NOTE — PROGRESS NOTES
Assessment and Plan:     Problem List Items Addressed This Visit     None           Preventive health issues were discussed with patient, and age appropriate screening tests were ordered as noted in patient's After Visit Summary  Personalized health advice and appropriate referrals for health education or preventive services given if needed, as noted in patient's After Visit Summary       History of Present Illness:     Patient presents for Medicare Annual Wellness visit    Patient Care Team:  Keven Bradley MD as PCP - MD Paty Ferrer MD     Problem List:     Patient Active Problem List   Diagnosis    Paroxysmal SVT (supraventricular tachycardia) ([facility] )    Essential hypertension    Dyslipidemia    LBBB (left bundle branch block)    Type 2 diabetes mellitus without complication, without long-term current use of insulin ([place] [address] )    Type 2 diabetes mellitus with mild nonproliferative retinopathy of both eyes without macular edema ([place] [address] )    Morbid obesity with BMI of 40 0-44 9, adult ([facility] )    Acquired hypothyroidism      Past Medical and Surgical History:     Past Medical History:   Diagnosis Date    Diabetes mellitus ([place] [address] )     Disease of thyroid gland     Hyperlipidemia     Hypertension     LBBB (left bundle branch block)     Palpitations      Past Surgical History:   Procedure Laterality Date    APPENDECTOMY       SECTION      HERNIA REPAIR      HIP SURGERY      KNEE ARTHROSCOPY Right     therapeutic     KNEE SURGERY Left     TONSILLECTOMY        Family History:     Family History   Problem Relation Age of Onset    Arthritis Mother     Other Mother         CABG    Cancer Mother     Heart disease Mother     Osteoporosis Mother     Lung cancer Father     Pancreatic cancer Father       Social History:        Social History     Socioeconomic History    Marital status: /Civil Union     Spouse name: Not on file    Number of children: Not on file    Years of education: Not on file    Highest education level: Not on file   Occupational History    Not on file   Social Needs    Financial resource strain: Not on file    Food insecurity     Worry: Not on file     Inability: Not on file    Transportation needs     Medical: Not on file     Non-medical: Not on file   Tobacco Use    Smoking status: Former Smoker    Smokeless tobacco: Never Used    Tobacco comment: quit 1976   Substance and Sexual Activity    Alcohol use: Not Currently    Drug use: No    Sexual activity: Not on file   Lifestyle    Physical activity     Days per week: Not on file     Minutes per session: Not on file    Stress: Not on file   Relationships    Social connections     Talks on phone: Not on file     Gets together: Not on file     Attends [employer] service: Not on file     Active member of club or organization: Not on file     Attends meetings of clubs or organizations: Not on file     Relationship status: Not on file    Intimate partner violence     Fear of current or ex partner: Not on file     Emotionally abused: Not on file     Physically abused: Not on file     Forced sexual activity: Not on file   Other Topics Concern    Not on file   Social History Narrative    Caffeine use       Medications and Allergies:     Current Outpatient Medications   Medication Sig Dispense Refill    Aspirin 81 MG EC tablet Take 1 tablet by mouth daily      Cyanocobalamin (B-12 PO) Take 1 capsule by mouth daily      hydrochlorothiazide (HYDRODIURIL) 25 mg tablet TAKE 1 TABLET BY MOUTH EVERY DAY 90 tablet 1    levothyroxine 50 mcg tablet TAKE 1 TABLET BY MOUTH DAILY 90 tablet 1    magnesium Oxide (MAG-OX) 400 mg TABS Take 1 tablet by mouth daily      metFORMIN (GLUCOPHAGE) 850 mg tablet TAKE 1 TABLET BY MOUTH TWICE A DAY WITH BREAKFAST AND DINNER 180 tablet 0    Multiple Vitamins-Minerals (CENTRUM SILVER 50+WOMEN PO) Take 1 tablet by mouth daily       Potassium 99 MG TABS Take 1 tablet by mouth daily      quinapril (ACCUPRIL) 20 mg tablet TAKE 1 TABLET BY MOUTH EVERY DAY 90 tablet 1    simvastatin (ZOCOR) 10 mg tablet TAKE 1 TABLET BY MOUTH EVERY DAY 90 tablet 1    VITAMIN E BLEND PO Take 1 capsule by mouth daily       No current facility-administered medications for this visit  No Known Allergies   Immunizations:     Immunization History   Administered Date(s) Administered    Hep A, adult 01/28/2015, 07/28/2015    INFLUENZA 10/27/2018, 10/11/2019    Influenza TIV (IM) 11/15/2010, 10/16/2015    Pneumococcal Conjugate 13-Valent 07/18/2017    Pneumococcal Polysaccharide PPV23 07/12/2016      Health Maintenance: There are no preventive care reminders to display for this patient  Topic Date Due    DTaP,Tdap,and Td Vaccines (1 - Tdap) [birth year]/1963    Influenza Vaccine  07/01/2020      Medicare Health Risk Assessment:     There were no vitals taken for this visit  April is here for her Subsequent Wellness visit  Health Risk Assessment:   Patient rates overall health as very good  Patient feels that their physical health rating is same  Eyesight was rated as same  Hearing was rated as same  Patient feels that their emotional and mental health rating is same  Pain experienced in the last 7 days has been some  Patient's pain rating has been 3/10  Patient states that she has experienced no weight loss or gain in last 6 months  Depression Screening:   PHQ-2 Score: 0      Fall Risk Screening: In the past year, patient has experienced: no history of falling in past year      Urinary Incontinence Screening:   Patient has not leaked urine accidently in the last six months  Home Safety:  Patient does not have trouble with stairs inside or outside of their home  Patient has working smoke alarms and has working carbon monoxide detector  Home safety hazards include: none  Nutrition:   Current diet is Regular and Diabetic       Medications:   Patient is currently taking over-the-counter supplements  OTC medications include: see medication list  Patient is able to manage medications  Activities of Daily Living (ADLs)/Instrumental Activities of Daily Living (IADLs):   Walk and transfer into and out of bed and chair?: Yes  Dress and groom yourself?: Yes    Bathe or shower yourself?: Yes    Feed yourself?  Yes  Do your laundry/housekeeping?: Yes  Manage your money, pay your bills and track your expenses?: Yes  Make your own meals?: Yes    Do your own shopping?: Yes    Previous Hospitalizations:   Any hospitalizations or ED visits within the last 12 months?: No      Advance Care Planning:   Living will: Yes    Advanced directive: Yes    Advanced directive counseling given: Yes      PREVENTIVE SCREENINGS      Cardiovascular Screening:    General: Screening Not Indicated and History Lipid Disorder      Diabetes Screening:     General: Screening Not Indicated and History Diabetes      Cervical Cancer Screening:    General: Screening Not Indicated      Lung Cancer Screening:     General: Screening Not Indicated      Nando Gillette MD

## 2020-08-04 NOTE — PATIENT INSTRUCTIONS
Medicare Preventive Visit Patient Instructions  Thank you for completing your Welcome to Medicare Visit or Medicare Annual Wellness Visit today  Your next wellness visit will be due in one year (8/4/2021)  The screening/preventive services that you may require over the next 5-10 years are detailed below  Some tests may not apply to you based off risk factors and/or age  Screening tests ordered at today's visit but not completed yet may show as past due  Also, please note that scanned in results may not display below  Preventive Screenings:  Service Recommendations Previous Testing/Comments   Colorectal Cancer Screening  * Colonoscopy    * Fecal Occult Blood Test (FOBT)/Fecal Immunochemical Test (FIT)  * Fecal DNA/Cologuard Test  * Flexible Sigmoidoscopy Age: 54-65 years old   Colonoscopy: every 10 years (may be performed more frequently if at higher risk)  OR  FOBT/FIT: every 1 year  OR  Cologuard: every 3 years  OR  Sigmoidoscopy: every 5 years  Screening may be recommended earlier than age 48 if at higher risk for colorectal cancer  Also, an individualized decision between you and your healthcare provider will decide whether screening between the ages of 74-80 would be appropriate  Colonoscopy: Not on file  FOBT/FIT: 07/08/2019  Cologuard: Not on file  Sigmoidoscopy: Not on file         Breast Cancer Screening Age: 36 years old  Frequency: every 1-2 years  Not required if history of left and right mastectomy Mammogram: Not on file       Cervical Cancer Screening Between the ages of 21-29, pap smear recommended once every 3 years  Between the ages of 33-67, can perform pap smear with HPV co-testing every 5 years     Recommendations may differ for women with a history of total hysterectomy, cervical cancer, or abnormal pap smears in past  Pap Smear: Not on file    Screening Not Indicated   Hepatitis C Screening Once for adults born between 1945 and 1965  More frequently in patients at high risk for Hepatitis C Hep C Antibody: Not on file       Diabetes Screening 1-2 times per year if you're at risk for diabetes or have pre-diabetes Fasting glucose: 162 mg/dL   A1C: 6 7 % of total Hgb    Screening Not Indicated  History Diabetes   Cholesterol Screening Once every 5 years if you don't have a lipid disorder  May order more often based on risk factors  Lipid panel: 07/07/2020    Screening Not Indicated  History Lipid Disorder     Other Preventive Screenings Covered by Medicare:  1  Abdominal Aortic Aneurysm (AAA) Screening: covered once if your at risk  You're considered to be at risk if you have a family history of AAA  2  Lung Cancer Screening: covers low dose CT scan once per year if you meet all of the following conditions: (1) Age 50-69; (2) No signs or symptoms of lung cancer; (3) Current smoker or have quit smoking within the last 15 years; (4) You have a tobacco smoking history of at least 30 pack years (packs per day multiplied by number of years you smoked); (5) You get a written order from a healthcare provider  3  Glaucoma Screening: covered annually if you're considered high risk: (1) You have diabetes OR (2) Family history of glaucoma OR (3)  aged 48 and older OR (3)  American aged 72 and older  3  Osteoporosis Screening: covered every 2 years if you meet one of the following conditions: (1) You're estrogen deficient and at risk for osteoporosis based off medical history and other findings; (2) Have a vertebral abnormality; (3) On glucocorticoid therapy for more than 3 months; (4) Have primary hyperparathyroidism; (5) On osteoporosis medications and need to assess response to drug therapy  · Last bone density test (DXA Scan): Not on file  5  HIV Screening: covered annually if you're between the age of 12-76  Also covered annually if you are younger than 13 and older than 72 with risk factors for HIV infection   For pregnant patients, it is covered up to 3 times per pregnancy  Immunizations:  Immunization Recommendations   Influenza Vaccine Annual influenza vaccination during flu season is recommended for all persons aged >= 6 months who do not have contraindications   Pneumococcal Vaccine (Prevnar and Pneumovax)  * Prevnar = PCV13  * Pneumovax = PPSV23   Adults 25-60 years old: 1-3 doses may be recommended based on certain risk factors  Adults 72 years old: Prevnar (PCV13) vaccine recommended followed by Pneumovax (PPSV23) vaccine  If already received PPSV23 since turning 65, then PCV13 recommended at least one year after PPSV23 dose  Hepatitis B Vaccine 3 dose series if at intermediate or high risk (ex: diabetes, end stage renal disease, liver disease)   Tetanus (Td) Vaccine - COST NOT COVERED BY MEDICARE PART B Following completion of primary series, a booster dose should be given every 10 years to maintain immunity against tetanus  Td may also be given as tetanus wound prophylaxis  Tdap Vaccine - COST NOT COVERED BY MEDICARE PART B Recommended at least once for all adults  For pregnant patients, recommended with each pregnancy  Shingles Vaccine (Shingrix) - COST NOT COVERED BY MEDICARE PART B  2 shot series recommended in those aged 48 and above     Health Maintenance Due:  There are no preventive care reminders to display for this patient  Immunizations Due:      Topic Date Due    DTaP,Tdap,and Td Vaccines (1 - Tdap) 05/07/1961    Influenza Vaccine  07/01/2020     Advance Directives   What are advance directives? Advance directives are legal documents that state your wishes and plans for medical care  These plans are made ahead of time in case you lose your ability to make decisions for yourself  Advance directives can apply to any medical decision, such as the treatments you want, and if you want to donate organs  What are the types of advance directives? There are many types of advance directives, and each state has rules about how to use them   You may choose a combination of any of the following:  · Living will: This is a written record of the treatment you want  You can also choose which treatments you do not want, which to limit, and which to stop at a certain time  This includes surgery, medicine, IV fluid, and tube feedings  · Durable power of  for healthcare [facility]): This is a written record that states who you want to make healthcare choices for you when you are unable to make them for yourself  This person, called a proxy, is usually a family member or a friend  You may choose more than 1 proxy  · Do not resuscitate (DNR) order:  A DNR order is used in case your heart stops beating or you stop breathing  It is a request not to have certain forms of treatment, such as CPR  A DNR order may be included in other types of advance directives  · Medical directive: This covers the care that you want if you are in a coma, near death, or unable to make decisions for yourself  You can list the treatments you want for each condition  Treatment may include pain medicine, surgery, blood transfusions, dialysis, IV or tube feedings, and a ventilator (breathing machine)  · Values history: This document has questions about your views, beliefs, and how you feel and think about life  This information can help others choose the care that you would choose  Why are advance directives important? An advance directive helps you control your care  Although spoken wishes may be used, it is better to have your wishes written down  Spoken wishes can be misunderstood, or not followed  Treatments may be given even if you do not want them  An advance directive may make it easier for your family to make difficult choices about your care  Weight Management   Why it is important to manage your weight:  Being overweight increases your risk of health conditions such as heart disease, high blood pressure, type 2 diabetes, and certain types of cancer   It can also increase your risk for osteoarthritis, sleep apnea, and other respiratory problems  Aim for a slow, steady weight loss  Even a small amount of weight loss can lower your risk of health problems  How to lose weight safely:  A safe and healthy way to lose weight is to eat fewer calories and get regular exercise  You can lose up about 1 pound a week by decreasing the number of calories you eat by 500 calories each day  Healthy meal plan for weight management:  A healthy meal plan includes a variety of foods, contains fewer calories, and helps you stay healthy  A healthy meal plan includes the following:  · Eat whole-grain foods more often  A healthy meal plan should contain fiber  Fiber is the part of grains, fruits, and vegetables that is not broken down by your body  Whole-grain foods are healthy and provide extra fiber in your diet  Some examples of whole-grain foods are whole-wheat breads and pastas, oatmeal, brown rice, and bulgur  · Eat a variety of vegetables every day  Include dark, leafy greens such as spinach, kale, [place] greens, and mustard greens  Eat yellow and orange vegetables such as carrots, sweet potatoes, and winter squash  · Eat a variety of fruits every day  Choose fresh or canned fruit (canned in its own juice or light syrup) instead of juice  Fruit juice has very little or no fiber  · Eat low-fat dairy foods  Drink fat-free (skim) milk or 1% milk  Eat fat-free yogurt and low-fat cottage cheese  Try low-fat cheeses such as mozzarella and other reduced-fat cheeses  · Choose meat and other protein foods that are low in fat  Choose beans or other legumes such as split peas or lentils  Choose fish, skinless poultry (chicken or turkey), or lean cuts of red meat (beef or pork)  Before you cook meat or poultry, cut off any visible fat  · Use less fat and oil  Try baking foods instead of frying them  Add less fat, such as margarine, sour cream, regular salad dressing and mayonnaise to foods   Eat fewer high-fat foods  Some examples of high-fat foods include french fries, doughnuts, ice cream, and cakes  · Eat fewer sweets  Limit foods and drinks that are high in sugar  This includes candy, cookies, regular soda, and sweetened drinks  Exercise:  Exercise at least 30 minutes per day on most days of the week  Some examples of exercise include walking, biking, dancing, and swimming  You can also fit in more physical activity by taking the stairs instead of the elevator or parking farther away from stores  Ask your healthcare provider about the best exercise plan for you  © Copyright [employer] 2018 Information is for End User's use only and may not be sold, redistributed or otherwise used for commercial purposes   All illustrations and images included in CareNotes® are the copyrighted property of A D A M , Inc  or [address] [place] [address]

## 2020-08-04 NOTE — ASSESSMENT & PLAN NOTE
Good control with met and diet compliance       Lab Results   Component Value Date    HGBA1C 6 7 (H) 07/07/2020

## 2020-08-04 NOTE — PROGRESS NOTES
Assessment/Plan:    Stage 3 chronic kidney disease ([facility] )  Could be age related    GFR is stable in mid 46s    Continue good control DM, HTN, avoid NSSAIDs, stay well hydrated  Type 2 diabetes mellitus without complication, without long-term current use of insulin (HCC)  Good control with met and diet compliance  Lab Results   Component Value Date    HGBA1C 6 7 (H) 07/07/2020       Acquired hypothyroidism  Patient's thyroid is well controlled  Her TSH is at goal   She takes levothyroxine 50 mcg  Continue levothyroxine, continue periodically TSH  Recheck at next visit  Essential hypertension  Patient takes Accupril, hydrochlorothiazide and metoprolol  Blood pressure is well controlled  She takes Accupril and HCTZ  Continue meds, low-salt diet  Continue follow-up here and with Cardiology  Paroxysmal SVT (supraventricular tachycardia) ([facility] )  Had ablation and no longer on beta blockers    Dyslipidemia  Last LDL was 79  She is taking simvastatin 10 mg  Continue simvastatin  Continue low-fat diet  Diagnoses and all orders for this visit:    Medicare annual wellness visit, subsequent    Type 2 diabetes mellitus without complication, without long-term current use of insulin ([facility] )    Acquired hypothyroidism    Essential hypertension    Stage 3 chronic kidney disease (HCC)    Paroxysmal SVT (supraventricular tachycardia) (HCC)    Dyslipidemia        Subjective:      Patient ID: Abena Harley is a 68 y o  female  Lives at home with   Both retired  Adult daughter next door  Declines mammo  Does do hemoccult      The following portions of the patient's history were reviewed and updated as appropriate: allergies, current medications, past family history, past medical history, past social history, past surgical history and problem list     Review of Systems   Constitutional: Negative for activity change, appetite change, fatigue and fever     HENT: Negative for congestion, ear pain, hearing loss, nosebleeds, postnasal drip, rhinorrhea and trouble swallowing  Eyes: Negative for photophobia, pain, redness and visual disturbance  Respiratory: Negative for cough, chest tightness, shortness of breath and wheezing  Cardiovascular: Negative for chest pain and palpitations  Gastrointestinal: Negative for abdominal pain, blood in stool, constipation, diarrhea, nausea and vomiting  Endocrine: Negative for cold intolerance, heat intolerance, polydipsia, polyphagia and polyuria  Genitourinary: Negative for difficulty urinating, dyspareunia, dysuria, flank pain, frequency, menstrual problem, pelvic pain, urgency, vaginal bleeding and vaginal discharge  Musculoskeletal: Negative for arthralgias, gait problem, joint swelling and myalgias  Skin: Negative for rash and wound  Neurological: Negative for dizziness, tremors, seizures, syncope, speech difficulty, weakness, light-headedness and headaches  Psychiatric/Behavioral: Negative for agitation, decreased concentration, dysphoric mood, sleep disturbance and suicidal ideas  The patient is not nervous/anxious  Objective:      /68 (BP Location: Left arm, Patient Position: Sitting, Cuff Size: Large)   Pulse 83   Temp (!) 95 4 °F (35 2 °C)   Ht 5' 4 5"   Wt 107 kg (236 lb)   SpO2 96%   BMI 39 88 kg/m²          Physical Exam   Constitutional: She is oriented to person, place, and time  She appears well-developed  No distress  Eyes: Pupils are equal, round, and reactive to light  Neck: Normal range of motion  Cardiovascular: Normal rate and normal pulses  Pulmonary/Chest: Effort normal and breath sounds normal  No respiratory distress  Neurological: She is alert and oriented to person, place, and time  Skin: She is not diaphoretic     Psychiatric: Her behavior is normal  Judgment and thought content normal

## 2020-08-04 NOTE — ASSESSMENT & PLAN NOTE
Patient takes Accupril, hydrochlorothiazide and metoprolol  Blood pressure is well controlled  She takes Accupril and HCTZ  Continue meds, low-salt diet  Continue follow-up here and with Cardiology

## 2020-08-04 NOTE — ASSESSMENT & PLAN NOTE
Could be age related    GFR is stable in mid 46s    Continue good control DM, HTN, avoid NSSAIDs, stay well hydrated

## 2020-08-16 DIAGNOSIS — E03.9 HYPOTHYROIDISM, UNSPECIFIED TYPE: ICD-10-CM

## 2020-08-17 RX ORDER — LEVOTHYROXINE SODIUM 0.05 MG/1
TABLET ORAL
Qty: 90 TABLET | Refills: 1 | Status: SHIPPED | OUTPATIENT
Start: 2020-08-17 | End: 2021-01-26

## 2020-08-19 ENCOUNTER — OFFICE VISIT (OUTPATIENT)
Dept: DERMATOLOGY | Facility: CLINIC | Age: 80
End: 2020-08-19
Payer: MEDICARE

## 2020-08-19 VITALS — TEMPERATURE: 98.3 F

## 2020-08-19 DIAGNOSIS — Z13.89 SCREENING FOR SKIN CONDITION: ICD-10-CM

## 2020-08-19 DIAGNOSIS — L82.1 SEBORRHEIC KERATOSIS: ICD-10-CM

## 2020-08-19 DIAGNOSIS — L98.9 UNKNOWN SKIN LESION: Primary | ICD-10-CM

## 2020-08-19 PROCEDURE — 2022F DILAT RTA XM EVC RTNOPTHY: CPT | Performed by: DERMATOLOGY

## 2020-08-19 PROCEDURE — 11102 TANGNTL BX SKIN SINGLE LES: CPT | Performed by: DERMATOLOGY

## 2020-08-19 PROCEDURE — 3044F HG A1C LEVEL LT 7.0%: CPT | Performed by: DERMATOLOGY

## 2020-08-19 PROCEDURE — 3075F SYST BP GE 130 - 139MM HG: CPT | Performed by: DERMATOLOGY

## 2020-08-19 PROCEDURE — 88305 TISSUE EXAM BY PATHOLOGIST: CPT | Performed by: STUDENT IN AN ORGANIZED HEALTH CARE EDUCATION/TRAINING PROGRAM

## 2020-08-19 PROCEDURE — 99213 OFFICE O/P EST LOW 20 MIN: CPT | Performed by: DERMATOLOGY

## 2020-08-19 PROCEDURE — 1170F FXNL STATUS ASSESSED: CPT | Performed by: DERMATOLOGY

## 2020-08-19 PROCEDURE — 1160F RVW MEDS BY RX/DR IN RCRD: CPT | Performed by: DERMATOLOGY

## 2020-08-19 PROCEDURE — 3078F DIAST BP <80 MM HG: CPT | Performed by: DERMATOLOGY

## 2020-08-19 PROCEDURE — 3066F NEPHROPATHY DOC TX: CPT | Performed by: DERMATOLOGY

## 2020-08-19 PROCEDURE — 11104 PUNCH BX SKIN SINGLE LESION: CPT | Performed by: DERMATOLOGY

## 2020-08-19 PROCEDURE — 4040F PNEUMOC VAC/ADMIN/RCVD: CPT | Performed by: DERMATOLOGY

## 2020-08-19 PROCEDURE — 1036F TOBACCO NON-USER: CPT | Performed by: DERMATOLOGY

## 2020-08-19 NOTE — PROGRESS NOTES
[address] [id number]  [phone]  [phone]     MRN: [id number] : 1940  Encounter: [id number]  Patient Information:   Chief complaint:  Lesions on face   History of present illness: 62-year-old male presents for recheck of lesions we noted during telemedicine session in May  I felt lesion may be a keratosis but advised patient to come in to recheck the site she also did not show me another lesion on her forehead  No other concerns  Past Medical History:   Diagnosis Date    Chronic kidney disease     Diabetes mellitus ([facility] )     Disease of thyroid gland     Hyperlipidemia     Hypertension     LBBB (left bundle branch block)     Palpitations     Seizures (HCC)     Stage 3 chronic kidney disease ([facility] ) 2020     Past Surgical History:   Procedure Laterality Date    APPENDECTOMY       SECTION      HERNIA REPAIR      HIP SURGERY      KNEE ARTHROSCOPY Right     therapeutic     KNEE SURGERY Left     TONSILLECTOMY       Social History   Social History     Substance and Sexual Activity   Alcohol Use Not Currently     Social History     Substance and Sexual Activity   Drug Use Never     Social History     Tobacco Use   Smoking Status Former Smoker   Smokeless Tobacco Never Used   Tobacco Comment    quit 1976     Family History   Problem Relation Age of Onset    Arthritis Mother     Other Mother         CABG    Cancer Mother     Heart disease Mother     Osteoporosis Mother     Lung cancer Father     Pancreatic cancer Father      Meds/Allergies   No Known Allergies    Meds:  Prior to Admission medications    Medication Sig Start Date End Date Taking?  Authorizing Provider   Aspirin 81 MG EC tablet Take 1 tablet by mouth daily   Yes Historical Provider, MD   Cyanocobalamin (B-12 PO) Take 1 capsule by mouth daily   Yes Historical Provider, MD   hydrochlorothiazide (HYDRODIURIL) 25 mg tablet TAKE 1 TABLET BY MOUTH EVERY DAY 3/24/20  Yes Katy Villavicencio MD   levothyroxine 50 mcg tablet TAKE 1 TABLET BY MOUTH EVERY DAY 8/17/20  Yes Katy Villavicencio MD   magnesium Oxide (MAG-OX) 400 mg TABS Take 1 tablet by mouth daily   Yes Historical Provider, MD   metFORMIN (GLUCOPHAGE) 850 mg tablet TAKE 1 TABLET BY MOUTH TWICE A DAY WITH BREAKFAST AND DINNER 7/31/20  Yes Katy Villavicencio MD   Multiple Vitamins-Minerals (CENTRUM SILVER 50+WOMEN PO) Take 1 tablet by mouth daily    Yes Historical Provider, MD   Potassium 99 MG TABS Take 1 tablet by mouth daily   Yes Historical Provider, MD   quinapril (ACCUPRIL) 20 mg tablet TAKE 1 TABLET BY MOUTH EVERY DAY 6/29/20  Yes Katy Villavicencio MD   simvastatin (ZOCOR) 10 mg tablet TAKE 1 TABLET BY MOUTH EVERY DAY 4/6/20  Yes Katy Villavicencio MD   VITAMIN E BLEND PO Take 1 capsule by mouth daily   Yes Historical Provider, MD       Subjective:     Review of Systems:    General: negative for - chills, fatigue, fever,  weight gain or weight loss  Psychological: negative for - anxiety, behavioral disorder, concentration difficulties, decreased libido, depression, irritability, memory difficulties, mood swings, sleep disturbances or suicidal ideation  ENT: negative for - hearing difficulties , nasal congestion, nasal discharge, oral lesions, sinus pain, sneezing, sore throat  Allergy and Immunology: negative for - hives, insect bite sensitivity,  Hematological and Lymphatic: negative for - bleeding problems, blood clots,bruising, swollen lymph nodes  Endocrine: negative for - hair pattern changes, hot flashes, malaise/lethargy, mood swings, palpitations, polydipsia/polyuria, skin changes, temperature intolerance or unexpected weight change  Respiratory: negative for - cough, hemoptysis, orthopnea, shortness of breath, or wheezing  Cardiovascular: negative for - chest pain, dyspnea on exertion, edema,  Gastrointestinal: negative for - abdominal pain, nausea/vomiting  Genito-Urinary: negative for - dysuria, incontinence, irregular/heavy menses or urinary frequency/urgency  Musculoskeletal: negative for - gait disturbance, joint pain, joint stiffness, joint swelling, muscle pain, muscular weakness  Dermatological:  As in HPI  Neurological: negative for confusion, dizziness, headaches, impaired coordination/balance, memory loss, numbness/tingling, seizures, speech problems, tremors or weakness       Objective:   Temp 98 3 °F (36 8 °C)     Physical Exam:    General Appearance:    Alert, cooperative, no distress   Head:    Normocephalic, without obvious abnormality, atraumatic           Skin:   A full skin exam was performed including scalp, head scalp, eyes, ears, nose, lips, neck, chest, axilla, abdomen, back, buttocks, bilateral upper extremities, bilateral lower extremities, hands, feet, fingers, toes, fingernails, and toenails pearly indurated slightly sclerotic 12 mm area noted on the left temple pigmented pearly area noted on the left cheek about 7 mm in size normal keratotic papules greasy stuck appearance elsewhere nothing else abnormal noted          Shave Biopsy Procedure Note    Pre-operative Diagnosis:  Rule out basal cell carcinoma pigmented basal cell    Plan:  1  Instructed to keep the wound dry and covered for 24 and clean thereafter  2  Warning signs of infection were reviewed  3  Recommended that the patient use OTC acetaminophen as needed for pain  4  Return  Pending results of biopsy(ies)    Locations:  Left forehead and left cheek    Indications:  Suspicious lesion    Anesthesia: Lidocaine 1% with epinephrine without added sodium bicarbonate    Procedure Details     Patient informed of the risks (including bleeding and infection) and benefits of the   procedure and Verbal informed consent obtained  The lesion and surrounding area were given a sterile prep using alcohol and draped in the usual sterile fashion  A Blue blade razor was used to obtain a specimen    Hemostasis achieved with aluminum chloride  Petrolatum and a sterile dressing applied  The specimen was sent for pathologic examination  The patient tolerated the procedure(s) well  Complications:  none  Since the area on left cheek appear to have pigment deeper we elected to perform a punch biopsy to better delineate this process  Punch Biopsy Procedure Note    Pre-operative Diagnosis:  Blue nevus rule out pigmented basal cell rule out atypia    Plan:  1  Instructed to keep the wound dry and covered for 24-48h and clean thereafter  2  Warning signs of infection were reviewed  3  Recommended that the patient use acetaminophen as needed for pain  Location(s):  Left cheek    Indications:  Suspicious lesion    Anesthesia: Lidocaine 1% with epinephrine without added sodium bicarbonate    Procedure Details     Patient informed of the risks (including bleeding,scaring and infection) and benefits of the procedure explained  Verbal informed consent obtained  The lesion and surrounding area was given a sterile prep using alcohol  The skin was then stretched perpendicular to the skin tension lines and the specimen obtained  using the 3 mm punch with a forceps and iris scissor  Area was closed with a 6 0 Ethilon suture  along with a pressure bandage  The specimen was sent for pathologic examination  The patient tolerated the procedure well  Wound care instructions given to patient  Condition:  Stable    Complications:  none  Assessment:     1  Unknown skin lesion     2  Seborrheic keratosis     3  Screening for skin condition           Plan:   Skin lesion await results of biopsy before proceeding further therapy may require Mohs  Seborrheic Keratosis  Patient reasurred these are normal growths we acquire with age no treatment needed    Screening for Dermatologic Disorders: Nothing else of concern noted on complete exam follow up in 1 year     Kodi Pinto MD  8/19/2020,3:11 PM    Portions of the record may have been created with voice recognition software   Occasional wrong word or "sound a like" substitutions may have occurred due to the inherent limitations of voice recognition software   Read the chart carefully and recognize, using context, where substitutions have occurred

## 2020-08-24 ENCOUNTER — TELEPHONE (OUTPATIENT)
Dept: DERMATOLOGY | Facility: CLINIC | Age: 80
End: 2020-08-24

## 2020-08-24 NOTE — TELEPHONE ENCOUNTER
Patient called because she is itching since Wednesday 1 we had injected her to take a biopsy of the post central skin cancer patient denies put anything on these areas other than petrolatum and peroxide as we had discussed    At present wonder she has reaction to the tape or secondary contact reaction advised her that Lidocaine would not cause this type reaction patient to try attempt to try  To take a picture of the rash if no improvement by tomorrow patient of will probably call to be seen

## 2020-08-27 ENCOUNTER — OFFICE VISIT (OUTPATIENT)
Dept: DERMATOLOGY | Facility: CLINIC | Age: 80
End: 2020-08-27

## 2020-08-27 DIAGNOSIS — C44.319 BASAL CELL CARCINOMA (BCC) OF LEFT FOREHEAD: Primary | ICD-10-CM

## 2020-08-27 DIAGNOSIS — C44.319 BASAL CELL CARCINOMA OF LEFT CHEEK: ICD-10-CM

## 2020-08-27 PROCEDURE — 4040F PNEUMOC VAC/ADMIN/RCVD: CPT | Performed by: DERMATOLOGY

## 2020-08-27 PROCEDURE — 3066F NEPHROPATHY DOC TX: CPT | Performed by: DERMATOLOGY

## 2020-08-27 PROCEDURE — 99024 POSTOP FOLLOW-UP VISIT: CPT | Performed by: DERMATOLOGY

## 2020-08-27 PROCEDURE — 3078F DIAST BP <80 MM HG: CPT | Performed by: DERMATOLOGY

## 2020-08-27 PROCEDURE — 1170F FXNL STATUS ASSESSED: CPT | Performed by: DERMATOLOGY

## 2020-08-27 PROCEDURE — 2022F DILAT RTA XM EVC RTNOPTHY: CPT | Performed by: DERMATOLOGY

## 2020-08-27 PROCEDURE — 3075F SYST BP GE 130 - 139MM HG: CPT | Performed by: DERMATOLOGY

## 2020-08-27 NOTE — PROGRESS NOTES
[facility] DERMATOLOGY  [address] [place] [address]  [phone]  [phone]     MRN: [id number] : 1940  Encounter: [id number]  Patient Information: April Houston    Subjective:     42-year-old female presents for follow-up for previously biopsied basal cell carcinomas x2 lesion on the temple area appears to be nodule basal cell area on the left cheek is a pigmented basal cell carcinoma     Objective: There were no vitals taken for this visit  Physical Exam:    General Appearance:    Alert, cooperative, no distress   Skin:  Suture was removed from the biopsy site     Assessment:     1  Basal cell carcinoma (BCC) of left forehead     2  Basal cell carcinoma of left cheek           Plan: Will schedule for excisional surgery here in the office      Prior to Admission medications    Medication Sig Start Date End Date Taking?  Authorizing Provider   Aspirin 81 MG EC tablet Take 1 tablet by mouth daily    Historical Provider, MD   Cyanocobalamin (B-12 PO) Take 1 capsule by mouth daily    Historical Provider, MD   hydrochlorothiazide (HYDRODIURIL) 25 mg tablet TAKE 1 TABLET BY MOUTH EVERY DAY 3/24/20   Joaquin Temple MD   levothyroxine 50 mcg tablet TAKE 1 TABLET BY MOUTH EVERY DAY 20   Joaquin Temple MD   magnesium Oxide (MAG-OX) 400 mg TABS Take 1 tablet by mouth daily    Historical Provider, MD   metFORMIN (GLUCOPHAGE) 850 mg tablet TAKE 1 TABLET BY MOUTH TWICE A DAY WITH BREAKFAST AND DINNER 20   Joaquin Temple MD   Multiple Vitamins-Minerals (CENTRUM SILVER 50+WOMEN PO) Take 1 tablet by mouth daily     Historical Provider, MD   Potassium 99 MG TABS Take 1 tablet by mouth daily    Historical Provider, MD   quinapril (ACCUPRIL) 20 mg tablet TAKE 1 TABLET BY MOUTH EVERY DAY 20   Joaquin Temple MD   simvastatin (ZOCOR) 10 mg tablet TAKE 1 TABLET BY MOUTH EVERY DAY 20   Joaquin Temple MD   VITAMIN E BLEND PO Take 1 capsule by mouth daily Historical Provider, MD     No Known Allergies    Petros Elizabeth MD  8/27/2020,10:48 AM    Portions of the record may have been created with voice recognition software   Occasional wrong word or "sound a like" substitutions may have occurred due to the inherent limitations of voice recognition software   Read the chart carefully and recognize, using context, where substitutions have occurred

## 2020-08-31 ENCOUNTER — OFFICE VISIT (OUTPATIENT)
Dept: CARDIOLOGY CLINIC | Facility: MEDICAL CENTER | Age: 80
End: 2020-08-31
Payer: MEDICARE

## 2020-08-31 VITALS
OXYGEN SATURATION: 98 % | HEIGHT: 65 IN | WEIGHT: 236.4 LBS | HEART RATE: 78 BPM | DIASTOLIC BLOOD PRESSURE: 66 MMHG | BODY MASS INDEX: 39.39 KG/M2 | SYSTOLIC BLOOD PRESSURE: 132 MMHG

## 2020-08-31 DIAGNOSIS — I44.7 LBBB (LEFT BUNDLE BRANCH BLOCK): ICD-10-CM

## 2020-08-31 DIAGNOSIS — I47.1 PAROXYSMAL SVT (SUPRAVENTRICULAR TACHYCARDIA) (HCC): Primary | ICD-10-CM

## 2020-08-31 DIAGNOSIS — I10 ESSENTIAL HYPERTENSION: ICD-10-CM

## 2020-08-31 DIAGNOSIS — E78.5 DYSLIPIDEMIA: ICD-10-CM

## 2020-08-31 PROCEDURE — 1160F RVW MEDS BY RX/DR IN RCRD: CPT | Performed by: INTERNAL MEDICINE

## 2020-08-31 PROCEDURE — 4040F PNEUMOC VAC/ADMIN/RCVD: CPT | Performed by: INTERNAL MEDICINE

## 2020-08-31 PROCEDURE — 99214 OFFICE O/P EST MOD 30 MIN: CPT | Performed by: INTERNAL MEDICINE

## 2020-08-31 PROCEDURE — 1170F FXNL STATUS ASSESSED: CPT | Performed by: INTERNAL MEDICINE

## 2020-08-31 PROCEDURE — 3078F DIAST BP <80 MM HG: CPT | Performed by: INTERNAL MEDICINE

## 2020-08-31 PROCEDURE — 2022F DILAT RTA XM EVC RTNOPTHY: CPT | Performed by: INTERNAL MEDICINE

## 2020-08-31 PROCEDURE — 3008F BODY MASS INDEX DOCD: CPT | Performed by: INTERNAL MEDICINE

## 2020-08-31 PROCEDURE — 3044F HG A1C LEVEL LT 7.0%: CPT | Performed by: INTERNAL MEDICINE

## 2020-08-31 PROCEDURE — 1036F TOBACCO NON-USER: CPT | Performed by: INTERNAL MEDICINE

## 2020-08-31 PROCEDURE — 3066F NEPHROPATHY DOC TX: CPT | Performed by: INTERNAL MEDICINE

## 2020-08-31 PROCEDURE — 3075F SYST BP GE 130 - 139MM HG: CPT | Performed by: INTERNAL MEDICINE

## 2020-08-31 NOTE — PROGRESS NOTES
Cardiology   April [place] 68 y o  female MRN: [id number]        Impression:  1  Paroxysmal SVT - s/p ablation    2  Hypertension - controlled  3  Dyslipidemia - on statin  4  Chronic LBBB    Recommendations:  1  Continue current medications  2  Follow up in 1 year  HPI: Scout Zuluaga is a 68y o  year old female with paroxysmal SVT s/p ablation , dyslipidemia, LBBB, and hypertension, who previously saw Dr Hailey Spangler, who returns for follow up  No further episodes of palpitations, and no chest pain or shortness of breath  Currently undergoing treatment for skin cancer  Review of Systems   Constitutional: Negative  HENT: Negative  Eyes: Negative  Respiratory: Negative for chest tightness and shortness of breath  Cardiovascular: Negative for chest pain, palpitations and leg swelling  Gastrointestinal: Negative  Endocrine: Negative  Genitourinary: Negative  Musculoskeletal: Negative  Skin: Negative  Allergic/Immunologic: Negative  Neurological: Negative  Hematological: Negative  Psychiatric/Behavioral: Negative  All other systems reviewed and are negative          Past Medical History:   Diagnosis Date    Chronic kidney disease     Diabetes mellitus (HCC)     Disease of thyroid gland     Hyperlipidemia     Hypertension     LBBB (left bundle branch block)     Palpitations     Seizures (HCC)     Stage 3 chronic kidney disease ([facility] [address] ) 2020     Past Surgical History:   Procedure Laterality Date    APPENDECTOMY       SECTION      HERNIA REPAIR      HIP SURGERY      KNEE ARTHROSCOPY Right     therapeutic     KNEE SURGERY Left     TONSILLECTOMY       Social History     Substance and Sexual Activity   Alcohol Use Not Currently     Social History     Substance and Sexual Activity   Drug Use Never     Social History     Tobacco Use   Smoking Status Former Smoker   Smokeless Tobacco Never Used   Tobacco Comment    quit      Family History Problem Relation Age of Onset    Arthritis Mother     Other Mother         CABG    Cancer Mother     Heart disease Mother     Osteoporosis Mother     Lung cancer Father     Pancreatic cancer Father        Allergies:  No Known Allergies    Medications:     Current Outpatient Medications:     Aspirin 81 MG EC tablet, Take 1 tablet by mouth daily, Disp: , Rfl:     Cyanocobalamin (B-12 PO), Take 1 capsule by mouth daily, Disp: , Rfl:     hydrochlorothiazide (HYDRODIURIL) 25 mg tablet, TAKE 1 TABLET BY MOUTH EVERY DAY, Disp: 90 tablet, Rfl: 1    levothyroxine 50 mcg tablet, TAKE 1 TABLET BY MOUTH EVERY DAY, Disp: 90 tablet, Rfl: 1    magnesium Oxide (MAG-OX) 400 mg TABS, Take 1 tablet by mouth daily, Disp: , Rfl:     metFORMIN (GLUCOPHAGE) 850 mg tablet, TAKE 1 TABLET BY MOUTH TWICE A DAY WITH BREAKFAST AND DINNER, Disp: 180 tablet, Rfl: 0    Multiple Vitamins-Minerals (CENTRUM SILVER 50+WOMEN PO), Take 1 tablet by mouth daily , Disp: , Rfl:     Potassium 99 MG TABS, Take 1 tablet by mouth daily, Disp: , Rfl:     quinapril (ACCUPRIL) 20 mg tablet, TAKE 1 TABLET BY MOUTH EVERY DAY, Disp: 90 tablet, Rfl: 1    simvastatin (ZOCOR) 10 mg tablet, TAKE 1 TABLET BY MOUTH EVERY DAY, Disp: 90 tablet, Rfl: 1    VITAMIN E BLEND PO, Take 1 capsule by mouth daily, Disp: , Rfl:       Wt Readings from Last 3 Encounters:   08/31/20 107 kg (236 lb 6 4 oz)   08/04/20 107 kg (236 lb)   02/04/20 106 kg (233 lb 4 oz)     Temp Readings from Last 3 Encounters:   08/19/20 98 3 °F (36 8 °C)   08/04/20 (!) 95 4 °F (35 2 °C)     BP Readings from Last 3 Encounters:   08/31/20 132/66   08/04/20 132/68   02/04/20 130/66     Pulse Readings from Last 3 Encounters:   08/31/20 78   08/04/20 83   02/04/20 80         Physical Exam  HENT:      Head: Atraumatic  Mouth/Throat:      Mouth: Mucous membranes are moist    Eyes:      Extraocular Movements: Extraocular movements intact  Neck:      Musculoskeletal: Normal range of motion  Cardiovascular:      Rate and Rhythm: Normal rate and regular rhythm  Heart sounds: Normal heart sounds  Pulmonary:      Effort: Pulmonary effort is normal       Breath sounds: Normal breath sounds  Abdominal:      General: Abdomen is flat  Musculoskeletal: Normal range of motion  Skin:     General: Skin is warm  Neurological:      General: No focal deficit present  Mental Status: She is alert and oriented to person, place, and time  Psychiatric:         Mood and Affect: Mood normal          Behavior: Behavior normal            Laboratory Studies:  CMP:  Lab Results   Component Value Date     01/10/2018    K 4 1 2020     2020    CO2 31 2020    ANIONGAP 8 10/14/2015    BUN 21 2020    CREATININE 0 95 (H) 2020    GLUCOSE 172 (H) 10/14/2015    AST 15 2020    ALT 10 2020    BILITOT 0 54 10/14/2015    EGFR 55 2019       Lipid Profile:   Lab Results   Component Value Date    CHOL 153 07/10/2017     Lab Results   Component Value Date    HDL 62 2020     Lab Results   Component Value Date    LDLCALC 79 2020     Lab Results   Component Value Date    TRIG 118 2020       Cardiac testing:     Results for orders placed during the hospital encounter of 10/24/19   Echo complete with contrast if indicated    Narrative [place] [address] [address] [place] [address]  [phone]    Transthoracic Echocardiogram  Limited 2D, M-mode, Doppler, and Color Doppler    Study date:  24-Oct-2019    Patient: Salome Goins  MR number: [id number]  Account number: [de-identified]  : 1940  Age: 70 years  Gender: Female  Status: Outpatient  Location: [facility]  Height: 64 in  Weight: 237 lb  BP: 122/ 78 mmHg    Indications: Murmur      Diagnoses: R01 1 - Cardiac murmur, unspecified    Sonographer:  FRANCA Fleming  Primary Physician:  Desire Ching MD  Referring Physician:  Noe Abraham MD  Group: [facility]  Interpreting Physician:  Cornell Harada, DO    IMPRESSIONS:  Normal left ventricular size and systolic function, ejection fraction 55%  Abnormal septal motion consistent with left bundle branch block  Grade 1 diastolic dysfunction  Normal right ventricular size and systolic function  Normal aortic root  Moderate left atrial dilation  Normal right atrium  Mildly calcified and sclerotic aortic valve with mild insufficiency and no stenosis  No other significant valve abnormalities  Normal IVC size and inspiratory collapse  RA pressure estimated at 3 mmHg  Upper normal pulmonary pressure  PASP estimated at 36 mmHg  Compared to prior echocardiogram, the aortic valve is now mildly calcified and sclerotic with mild aortic insufficiency  SUMMARY    LEFT VENTRICLE:  Systolic function was normal by visual assessment  Ejection fraction was estimated to be 55 %  There were no regional wall motion abnormalities  Doppler parameters were consistent with abnormal left ventricular relaxation (grade 1 diastolic dysfunction)  VENTRICULAR SEPTUM:  There was mild paradoxical motion  These changes are consistent with LBBB  RIGHT VENTRICLE:  Systolic pressure was at the upper limits of normal  Estimated peak pressure was 36 mmHg  LEFT ATRIUM:  The atrium was moderately dilated  AORTIC VALVE:  Leaflets exhibited moderate calcification, lower normal cuspal separation, and sclerosis  There was mild regurgitation  Regurgitation grade was 1+ on a scale of 0 to 4+  COMPARISONS:  Compared to prior echocardiogram, the aortic valve is now mildly calcified and sclerotic with mild aortic insufficiency  HISTORY: PRIOR HISTORY: SVT  LBBB  Risk factors: diabetes, medication-treated hypercholesterolemia, and morbid obesity  PRIOR PROCEDURES: Arrhythmia ablation  PROCEDURE: The study was performed in the [address] [place] [address]  This was a routine study   The transthoracic approach was used  The study included limited 2D imaging, M-mode, complete spectral Doppler, and color Doppler  The  heart rate was 78 bpm, at the start of the study  Images were obtained from the parasternal, apical, subcostal, and suprasternal notch acoustic windows  Intravenous contrast ( 1 2 Definity in NSS  , 6 ml) was administered to opacify the  left ventricle  This was a technically difficult study  LEFT VENTRICLE: Size was normal  Systolic function was normal by visual assessment  Ejection fraction was estimated to be 55 %  There were no regional wall motion abnormalities  Wall thickness was normal  DOPPLER: Doppler parameters were  consistent with abnormal left ventricular relaxation (grade 1 diastolic dysfunction)  VENTRICULAR SEPTUM: There was mild paradoxical motion  These changes are consistent with LBBB  RIGHT VENTRICLE: The size was normal  Systolic function was normal  Wall thickness was normal  DOPPLER: Systolic pressure was at the upper limits of normal  Estimated peak pressure was 36 mmHg  LEFT ATRIUM: The atrium was moderately dilated  RIGHT ATRIUM: Size was normal     MITRAL VALVE: Valve structure was normal  There was normal leaflet separation  DOPPLER: The transmitral velocity was within the normal range  There was no evidence for stenosis  There was no regurgitation  AORTIC VALVE: Leaflets exhibited moderate calcification, lower normal cuspal separation, and sclerosis  DOPPLER: Transaortic velocity was minimally increased  There was no evidence for stenosis  There was mild regurgitation  Regurgitation  grade was 1+ on a scale of 0 to 4+  TRICUSPID VALVE: The valve structure was normal  There was normal leaflet separation  DOPPLER: The transtricuspid velocity was within the normal range  There was no evidence for stenosis  There was no regurgitation  PULMONIC VALVE: Leaflets exhibited normal thickness, no calcification, and normal cuspal separation   DOPPLER: The transpulmonic velocity was within the normal range  There was no regurgitation  PERICARDIUM: There was no pericardial effusion  The pericardium was normal in appearance  AORTA: The root exhibited normal size  SYSTEMIC VEINS: IVC: The inferior vena cava was normal in size and course  Respirophasic changes were normal  RA pressure estimated at 3 mmHg  SYSTEM MEASUREMENT TABLES    2D  %FS: 24 97 %  Ao Diam: 3 02 cm  EDV(Teich): 72 41 ml  EF(Teich): 49 89 %  ESV(Teich): 36 28 ml  IVSd: 1 01 cm  LA Diam: 3 31 cm  LVIDd: 4 06 cm  LVIDs: 3 04 cm  LVOT Diam: 1 83 cm  LVPWd: 0 99 cm  RWT: 0 49  SV(Teich): 36 13 ml    2D mode  LAAs: 20 5 cm2  RAAs: 11 4 cm2    CW  AR Dec [place]: 2 55 m/s2  AR Dec Time: 1422 06 ms  AR PHT: 412 4 ms  AR Vmax: 3 63 m/s  AR maxP 69 mmHg  AV Env  Ti: 342 53 ms  AV MaxP 45 mmHg  AV VTI: 44 79 cm  AV Vmax: 1 83 m/s  AV Vmean: 1 31 m/s  AV meanP 52 mmHg  TR MaxP 55 mmHg  TR Vmax: 2 85 m/s    MM  TAPSE: 2 45 cm    PW  DEMETRIA (VTI): 1 8 cm2  DEMETRIA Vmax: 1 89 cm2  E': 0 06 m/s  E/E': 12 59  LVOT Env  Ti: 338 73 ms  LVOT VTI: 30 58 cm  LVOT Vmax: 1 31 m/s  LVOT Vmean: 0 9 m/s  LVOT maxP 89 mmHg  LVOT meanPG: 3 84 mmHg  LVSV Dopp: 80 72 ml  MV A Jude: 1 12 m/s  MV Dec [place]: 4 68 m/s2  MV DecT: 170 34 ms  MV E Jude: 0 8 m/s  MV E/A Ratio: 0 71  MV PHT: 49 4 ms  MVA By PHT: 4 45 cm2    [employer] Accredited Echocardiography Laboratory    Prepared and electronically signed by    Olivia Butler DO  Signed 24-Oct-2019 14:28:31

## 2020-09-16 ENCOUNTER — PROCEDURE VISIT (OUTPATIENT)
Dept: DERMATOLOGY | Facility: CLINIC | Age: 80
End: 2020-09-16
Payer: MEDICARE

## 2020-09-16 VITALS — TEMPERATURE: 98 F

## 2020-09-16 DIAGNOSIS — C44.319 BASAL CELL CARCINOMA (BCC) OF LEFT FOREHEAD: ICD-10-CM

## 2020-09-16 DIAGNOSIS — C44.319 BASAL CELL CARCINOMA OF LEFT CHEEK: ICD-10-CM

## 2020-09-16 PROCEDURE — 88305 TISSUE EXAM BY PATHOLOGIST: CPT | Performed by: STUDENT IN AN ORGANIZED HEALTH CARE EDUCATION/TRAINING PROGRAM

## 2020-09-16 NOTE — PROGRESS NOTES
[facility] DERMATOLOGY  [address] [place] [id number]  [phone]  [phone]     MRN: [id number] : 1940  Encounter: [id number]  Patient Information:     Subjective:     77-year-old female presents for planned excision of previously biopsied basal cell carcinoma left forehead and left cheek     Objective:   Temp 98 °F (36 7 °C)     Physical Exam:    General Appearance:    Alert, cooperative, no distress   Skin:   Previous sites of biopsy noted    Procedure name: Excision with intermediate layered closure        Location:  Left forehead    Diagnosis: Basal cell carcinoma    Size of lesion: 12 mm    Margins: 4 mm    Size + Margins: 20 mm    I explained the diagnosis to the patient and we recommend an excision of the lesion for diagnosis and/or treatment  Potential complications include, but are not limited to: scarring, bleeding, infection, incomplete removal, recurrence, and nerve damage  The risks, benefits, and alternatives were discussed with the patient in detail  The location of the tumor was identified, circled, and confirmed by the patient  The correct patient, site, and procedure were confirmed  Anesthesia: 1% xylocaine with 1:100,000 epinephrine 6 cc  The patient was brought into the room, prepped and draped sterilely in the usual manner and anesthesia was administered by local infiltration  A fusiform shape was drawn around the lesion, and the margins were incised to the level of the subcutaneous fat with a number 15cc Bard-Alirio blade  The tissue was removed with sharp and blunt dissection  The lateral margins of the resulting defect were undermined with sharp and blunt dissection  Hemostasis was achieved with electrocautery  The deeper layers of the defect, including subcutaneous fat and fascia, had to be approximated to reduce tension on the suture line  Layered wound closure was performed    The wound was cleaned with saline, dried off, petroleum applied, and the wound was covered with a pressure dressing  The patient was given detailed verbal and written instructions on postoperative care  Suture for adipose/fascial/dermal layer closure: 5-0  monocryl 5 sutures interrupted    Suture used to approximate epidermis: 6-0  nylon 9 sutures interrupted    Final wound length: 4 0 cm    Follow-up in: 1  weeks  Patient tolerated procedure well without complications  Procedure name: Excision with intermediate layered closure        Location:  Left cheek    Diagnosis: Basal cell carcinoma    Size of lesion: 7 mm    Margins: 4 mm    Size + Margins: 15 mm    I explained the diagnosis to the patient and we recommend an excision of the lesion for diagnosis and/or treatment  Potential complications include, but are not limited to: scarring, bleeding, infection, incomplete removal, recurrence, and nerve damage  The risks, benefits, and alternatives were discussed with the patient in detail  The location of the tumor was identified, circled, and confirmed by the patient  The correct patient, site, and procedure were confirmed  Anesthesia: 1% xylocaine with 1:100,000 epinephrine 6 cc  The patient was brought into the room, prepped and draped sterilely in the usual manner and anesthesia was administered by local infiltration  A fusiform shape was drawn around the lesion, and the margins were incised to the level of the subcutaneous fat with a number 15cc Bard-Alirio blade  The tissue was removed with sharp and blunt dissection  The lateral margins of the resulting defect were undermined with sharp and blunt dissection  Hemostasis was achieved with electrocautery  The deeper layers of the defect, including subcutaneous fat and fascia, had to be approximated to reduce tension on the suture line  Layered wound closure was performed    The wound was cleaned with saline, dried off, petroleum applied, and the wound was covered with a pressure dressing  The patient was given detailed verbal and written instructions on postoperative care  Suture for adipose/fascial/dermal layer closure: 5-0  monocryl 3 sutures interrupted    Suture used to approximate epidermis: 6-0  nylon  7sutures interrupted    Final wound length: 3 9 cm    Follow-up in: 1 weeks  Patient tolerated procedure well without complications  Assessment:     1  Basal cell carcinoma (BCC) of left forehead     2  Basal cell carcinoma of left cheek           Plan:   Wound care instructions given to patient        Prior to Admission medications    Medication Sig Start Date End Date Taking?  Authorizing Provider   Aspirin 81 MG EC tablet Take 1 tablet by mouth daily    Historical Provider, MD   Cyanocobalamin (B-12 PO) Take 1 capsule by mouth daily    Historical Provider, MD   hydrochlorothiazide (HYDRODIURIL) 25 mg tablet TAKE 1 TABLET BY MOUTH EVERY DAY 3/24/20   Mamadou Munoz MD   levothyroxine 50 mcg tablet TAKE 1 TABLET BY MOUTH EVERY DAY 8/17/20   Mamadou Munoz MD   magnesium Oxide (MAG-OX) 400 mg TABS Take 1 tablet by mouth daily    Historical Provider, MD   metFORMIN (GLUCOPHAGE) 850 mg tablet TAKE 1 TABLET BY MOUTH TWICE A DAY WITH BREAKFAST AND DINNER 7/31/20   Mamadou Munoz MD   Multiple Vitamins-Minerals (CENTRUM SILVER 50+WOMEN PO) Take 1 tablet by mouth daily     Historical Provider, MD   Potassium 99 MG TABS Take 1 tablet by mouth daily    Historical Provider, MD   quinapril (ACCUPRIL) 20 mg tablet TAKE 1 TABLET BY MOUTH EVERY DAY 6/29/20   Mamadou Munoz MD   simvastatin (ZOCOR) 10 mg tablet TAKE 1 TABLET BY MOUTH EVERY DAY 4/6/20   Mamadou Munoz MD   VITAMIN E BLEND PO Take 1 capsule by mouth daily    Historical Provider, MD     No Known Allergies    Anahy Forrest MD  9/16/2020,11:56 AM    Portions of the record may have been created with voice recognition software   Occasional wrong word or "sound a like" substitutions may have occurred due to the inherent limitations of voice recognition software   Read the chart carefully and recognize, using context, where substitutions have occurred

## 2020-09-21 ENCOUNTER — TELEPHONE (OUTPATIENT)
Dept: DERMATOLOGY | Facility: CLINIC | Age: 80
End: 2020-09-21

## 2020-09-21 NOTE — TELEPHONE ENCOUNTER
----- Message from Nayla Baeza MD sent at 9/21/2020  1:59 PM EDT -----  Call patient if not coming in shortly for suture removal to let him know that the margins were clear

## 2020-09-23 ENCOUNTER — OFFICE VISIT (OUTPATIENT)
Dept: DERMATOLOGY | Facility: CLINIC | Age: 80
End: 2020-09-23

## 2020-09-23 VITALS — TEMPERATURE: 97.2 F

## 2020-09-23 DIAGNOSIS — I10 ESSENTIAL HYPERTENSION: ICD-10-CM

## 2020-09-23 DIAGNOSIS — C44.319 BASAL CELL CARCINOMA (BCC) OF LEFT FOREHEAD: Primary | ICD-10-CM

## 2020-09-23 DIAGNOSIS — C44.319 BASAL CELL CARCINOMA OF LEFT CHEEK: ICD-10-CM

## 2020-09-23 PROCEDURE — RECHECK: Performed by: DERMATOLOGY

## 2020-09-23 RX ORDER — HYDROCHLOROTHIAZIDE 25 MG/1
TABLET ORAL
Qty: 90 TABLET | Refills: 1 | Status: SHIPPED | OUTPATIENT
Start: 2020-09-23 | End: 2021-01-26

## 2020-09-23 NOTE — PATIENT INSTRUCTIONS
Areas well-healed discussed the concept of nerve involvement at this point no further intervention necessary until less patient feels anything the area and we will recheck in 6 months

## 2020-09-23 NOTE — PROGRESS NOTES
[address]  [address] [facility] [address]  [phone]  [phone]     MRN: [id number] : 1940  Encounter: [id number]  Patient Information:     Subjective:     22-year-old female presents for follow-up secondary to previously excised basal cell carcinoma left forehead and left cheek     Objective:   Temp (!) 97 2 °F (36 2 °C) (Probe)     Physical Exam:    General Appearance:    Alert, cooperative, no distress   Skin:  Previous site of excision healing well  Procedure:  Suture removal  Site of excision:  Left forehead and left cheek  Wound was cleansed with saline  Wound is intact  Sutures removed  Steri-Strips applied  Biopsy revealed margins clear however lesion on the left forehead showed involvement of a nerve 0 1 mm in size  Assessment:     1  Basal cell carcinoma (BCC) of left forehead     2  Basal cell carcinoma of left cheek           Plan:   Areas well-healed discussed the concept of nerve involvement at this point no further intervention necessary until less patient feels anything the area and we will recheck in 6 months      Prior to Admission medications    Medication Sig Start Date End Date Taking?  Authorizing Provider   Aspirin 81 MG EC tablet Take 1 tablet by mouth daily    Historical Provider, MD   Cyanocobalamin (B-12 PO) Take 1 capsule by mouth daily    Historical Provider, MD   hydrochlorothiazide (HYDRODIURIL) 25 mg tablet TAKE 1 TABLET BY MOUTH EVERY DAY 3/24/20   Lenor Schirmer, MD   levothyroxine 50 mcg tablet TAKE 1 TABLET BY MOUTH EVERY DAY 20   Lenor Schirmer, MD   magnesium Oxide (MAG-OX) 400 mg TABS Take 1 tablet by mouth daily    Historical Provider, MD   metFORMIN (GLUCOPHAGE) 850 mg tablet TAKE 1 TABLET BY MOUTH TWICE A DAY WITH BREAKFAST AND DINNER 20   Lenor Schirmer, MD   Multiple Vitamins-Minerals (CENTRUM SILVER 50+WOMEN PO) Take 1 tablet by mouth daily     Historical Provider, MD Potassium 99 MG TABS Take 1 tablet by mouth daily    Historical Provider, MD   quinapril (ACCUPRIL) 20 mg tablet TAKE 1 TABLET BY MOUTH EVERY DAY 6/29/20   Roland Cruz MD   simvastatin (ZOCOR) 10 mg tablet TAKE 1 TABLET BY MOUTH EVERY DAY 4/6/20   Roland Cruz MD   VITAMIN E BLEND PO Take 1 capsule by mouth daily    Historical Provider, MD     No Known Allergies    Carey Oswald MD  9/23/2020,11:08 AM    Portions of the record may have been created with voice recognition software   Occasional wrong word or "sound a like" substitutions may have occurred due to the inherent limitations of voice recognition software   Read the chart carefully and recognize, using context, where substitutions have occurred

## 2020-09-30 DIAGNOSIS — E78.2 MIXED HYPERLIPIDEMIA: ICD-10-CM

## 2020-10-01 RX ORDER — SIMVASTATIN 10 MG
TABLET ORAL
Qty: 90 TABLET | Refills: 1 | Status: SHIPPED | OUTPATIENT
Start: 2020-10-01 | End: 2021-01-26

## 2020-10-23 DIAGNOSIS — E11.9 TYPE 2 DIABETES MELLITUS WITHOUT COMPLICATION, WITHOUT LONG-TERM CURRENT USE OF INSULIN (HCC): ICD-10-CM

## 2020-12-20 DIAGNOSIS — I10 HYPERTENSION, UNSPECIFIED TYPE: ICD-10-CM

## 2020-12-22 RX ORDER — QUINAPRIL 20 MG/1
TABLET ORAL
Qty: 90 TABLET | Refills: 1 | Status: SHIPPED | OUTPATIENT
Start: 2020-12-22 | End: 2021-05-13

## 2021-01-17 DIAGNOSIS — E11.9 TYPE 2 DIABETES MELLITUS WITHOUT COMPLICATION, WITHOUT LONG-TERM CURRENT USE OF INSULIN (HCC): ICD-10-CM

## 2021-01-20 DIAGNOSIS — Z23 ENCOUNTER FOR IMMUNIZATION: ICD-10-CM

## 2021-01-22 ENCOUNTER — IMMUNIZATIONS (OUTPATIENT)
Dept: FAMILY MEDICINE CLINIC | Facility: HOSPITAL | Age: 81
End: 2021-01-22

## 2021-01-22 DIAGNOSIS — Z23 ENCOUNTER FOR IMMUNIZATION: Primary | ICD-10-CM

## 2021-01-22 PROCEDURE — 91300 SARS-COV-2 / COVID-19 MRNA VACCINE (PFIZER-BIONTECH) 30 MCG: CPT

## 2021-01-22 PROCEDURE — 0001A SARS-COV-2 / COVID-19 MRNA VACCINE (PFIZER-BIONTECH) 30 MCG: CPT

## 2021-01-25 DIAGNOSIS — I10 ESSENTIAL HYPERTENSION: ICD-10-CM

## 2021-01-25 DIAGNOSIS — E03.9 HYPOTHYROIDISM, UNSPECIFIED TYPE: ICD-10-CM

## 2021-01-25 DIAGNOSIS — E78.2 MIXED HYPERLIPIDEMIA: ICD-10-CM

## 2021-01-26 RX ORDER — SIMVASTATIN 10 MG
TABLET ORAL
Qty: 90 TABLET | Refills: 1 | Status: SHIPPED | OUTPATIENT
Start: 2021-01-26 | End: 2021-11-04

## 2021-01-26 RX ORDER — LEVOTHYROXINE SODIUM 0.05 MG/1
TABLET ORAL
Qty: 90 TABLET | Refills: 1 | Status: SHIPPED | OUTPATIENT
Start: 2021-01-26 | End: 2021-05-12

## 2021-01-26 RX ORDER — HYDROCHLOROTHIAZIDE 25 MG/1
TABLET ORAL
Qty: 90 TABLET | Refills: 1 | Status: SHIPPED | OUTPATIENT
Start: 2021-01-26 | End: 2021-09-03

## 2021-02-12 ENCOUNTER — IMMUNIZATIONS (OUTPATIENT)
Dept: FAMILY MEDICINE CLINIC | Facility: HOSPITAL | Age: 81
End: 2021-02-12

## 2021-02-12 DIAGNOSIS — Z23 ENCOUNTER FOR IMMUNIZATION: Primary | ICD-10-CM

## 2021-02-12 PROCEDURE — 0002A SARS-COV-2 / COVID-19 MRNA VACCINE (PFIZER-BIONTECH) 30 MCG: CPT

## 2021-02-12 PROCEDURE — 91300 SARS-COV-2 / COVID-19 MRNA VACCINE (PFIZER-BIONTECH) 30 MCG: CPT

## 2021-02-26 ENCOUNTER — OFFICE VISIT (OUTPATIENT)
Dept: FAMILY MEDICINE CLINIC | Facility: MEDICAL CENTER | Age: 81
End: 2021-02-26
Payer: MEDICARE

## 2021-02-26 VITALS
SYSTOLIC BLOOD PRESSURE: 142 MMHG | HEIGHT: 65 IN | HEART RATE: 72 BPM | BODY MASS INDEX: 38.39 KG/M2 | WEIGHT: 230.4 LBS | TEMPERATURE: 97.6 F | OXYGEN SATURATION: 97 % | DIASTOLIC BLOOD PRESSURE: 70 MMHG

## 2021-02-26 DIAGNOSIS — Z23 ENCOUNTER FOR IMMUNIZATION: ICD-10-CM

## 2021-02-26 DIAGNOSIS — E11.9 TYPE 2 DIABETES MELLITUS WITHOUT COMPLICATION, WITHOUT LONG-TERM CURRENT USE OF INSULIN (HCC): Primary | ICD-10-CM

## 2021-02-26 DIAGNOSIS — I10 ESSENTIAL HYPERTENSION: ICD-10-CM

## 2021-02-26 DIAGNOSIS — E01.0 THYROMEGALY: ICD-10-CM

## 2021-02-26 DIAGNOSIS — E55.9 VITAMIN D DEFICIENCY: ICD-10-CM

## 2021-02-26 DIAGNOSIS — E03.9 HYPOTHYROIDISM, UNSPECIFIED TYPE: ICD-10-CM

## 2021-02-26 LAB — SL AMB POCT HEMOGLOBIN AIC: 6.9 (ref ?–6.5)

## 2021-02-26 PROCEDURE — 99214 OFFICE O/P EST MOD 30 MIN: CPT | Performed by: NURSE PRACTITIONER

## 2021-02-26 PROCEDURE — 83036 HEMOGLOBIN GLYCOSYLATED A1C: CPT | Performed by: NURSE PRACTITIONER

## 2021-02-26 NOTE — PROGRESS NOTES
Assessment/Plan:    Please schedule thyroid ultrasound as ordered  Have fasting blood work drawn after July 2021, follow-up end of July for annual Medicare wellness  Keep an eye on your blood pressure if you notice that is consistently greater than 135/85 please be seen sooner  Problem List Items Addressed This Visit        Endocrine    Type 2 diabetes mellitus without complication, without long-term current use of insulin ([facility] [address] ) - Primary    Relevant Orders    POCT hemoglobin A1c (Completed)    Hemoglobin A1C    UA w Reflex to Microscopic w Reflex to Culture       Cardiovascular and Mediastinum    Essential hypertension    Relevant Orders    CBC and differential    Comprehensive metabolic panel    Lipid panel    TSH, 3rd generation with Free T4 reflex    UA w Reflex to Microscopic w Reflex to Culture      Other Visit Diagnoses     Hypothyroidism, unspecified type        Relevant Orders    TSH, 3rd generation with Free T4 reflex    UA w Reflex to Microscopic w Reflex to Culture    US head neck soft tissue    Vitamin D deficiency        Relevant Orders    Vitamin D 25 hydroxy    Encounter for immunization        Relevant Medications    tetanus-diphtheria-acellular pertussis (ADACEL) 5-2-15 5 LF-mcg/0 5 injection    Thyromegaly        Relevant Orders    US head neck soft tissue            Subjective:      Patient ID: Abena Marina is a 68 y o  female  This 79-year-old female presents today for follow-up on hypertension, hypothyroidism and DM 2  She is compliant with current medication regimen and denies any side effects  Point of care A1c done today 6 9% target  Blood pressure mildly elevated 142/70 she understands that target for is less than 135  She denies any CV symptoms  She denies any endocrine symptoms  She offers no complaints today        The following portions of the patient's history were reviewed and updated as appropriate:   She has a past medical history of Chronic kidney disease, Diabetes mellitus ([facility] [address] ), Disease of thyroid gland, Hyperlipidemia, Hypertension, LBBB (left bundle branch block), Palpitations, Seizures ([facility] [address] ), and Stage 3 chronic kidney disease (2020)  ,  does not have any pertinent problems on file  ,   has a past surgical history that includes Appendectomy;  section; Hip surgery; Knee arthroscopy (Right); Knee surgery (Left); Tonsillectomy; and Hernia repair  ,  family history includes Arthritis in her mother; Cancer in her mother; Heart disease in her mother; Lung cancer in her father; Osteoporosis in her mother; Other in her mother; Pancreatic cancer in her father  ,   reports that she has quit smoking  She has never used smokeless tobacco  She reports previous alcohol use  She reports that she does not use drugs  ,  has No Known Allergies     Current Outpatient Medications   Medication Sig Dispense Refill    Aspirin 81 MG EC tablet Take 1 tablet by mouth daily      Cyanocobalamin (B-12 PO) Take 1 capsule by mouth daily      hydrochlorothiazide (HYDRODIURIL) 25 mg tablet TAKE 1 TABLET BY MOUTH EVERY DAY 90 tablet 1    levothyroxine 50 mcg tablet TAKE 1 TABLET BY MOUTH EVERY DAY 90 tablet 1    magnesium Oxide (MAG-OX) 400 mg TABS Take 1 tablet by mouth daily      metFORMIN (GLUCOPHAGE) 850 mg tablet TAKE 1 TABLET BY MOUTH TWICE A DAY WITH BREAKFAST AND DINNER 180 tablet 0    Multiple Vitamins-Minerals (CENTRUM SILVER 50+WOMEN PO) Take 1 tablet by mouth daily       Potassium 99 MG TABS Take 1 tablet by mouth daily      quinapril (ACCUPRIL) 20 mg tablet TAKE 1 TABLET BY MOUTH EVERY DAY 90 tablet 1    simvastatin (ZOCOR) 10 mg tablet TAKE 1 TABLET BY MOUTH EVERY DAY 90 tablet 1    VITAMIN E BLEND PO Take 1 capsule by mouth daily      tetanus-diphtheria-acellular pertussis (ADACEL) 5--15 5 LF-mcg/0 5 injection Inject 0 5 mL into a muscle once for 1 dose 0 5 mL 0     No current facility-administered medications for this visit          Review of Systems Constitutional: Negative  HENT: Negative  Eyes: Negative  Respiratory: Negative  Cardiovascular: Negative  Gastrointestinal: Negative  Endocrine: Negative  Genitourinary: Negative  Musculoskeletal: Negative  Skin: Negative  Allergic/Immunologic: Negative  Neurological: Negative  Hematological: Negative  Psychiatric/Behavioral: Negative  All other systems reviewed and are negative  Objective:  Vitals:    02/26/21 1024   BP: 142/70   BP Location: Left arm   Patient Position: Sitting   Cuff Size: Large   Pulse: 72   Temp: 97 6 °F (36 4 °C)   SpO2: 97%   Weight: 105 kg (230 lb 6 4 oz)   Height: 5' 4 5" (1 638 m)     Body mass index is 38 94 kg/m²  Physical Exam  Vitals signs and nursing note reviewed  Constitutional:       Appearance: Normal appearance  She is well-developed  She is obese  HENT:      Head: Normocephalic and atraumatic  Right Ear: Tympanic membrane, ear canal and external ear normal       Left Ear: Tympanic membrane, ear canal and external ear normal       Nose:      Comments: Mask in place  Eyes:      Conjunctiva/sclera: Conjunctivae normal    Neck:      Musculoskeletal: Normal range of motion and neck supple  Thyroid: No thyromegaly  Cardiovascular:      Rate and Rhythm: Normal rate and regular rhythm  Heart sounds: Normal heart sounds  No murmur  No friction rub  No gallop  Pulmonary:      Effort: Pulmonary effort is normal       Breath sounds: Normal breath sounds  Abdominal:      General: Bowel sounds are normal  There is no distension  Palpations: Abdomen is soft  There is no mass  Tenderness: There is no abdominal tenderness  There is no guarding  Hernia: No hernia is present  Musculoskeletal: Normal range of motion  Lymphadenopathy:      Cervical: No cervical adenopathy  Skin:     General: Skin is warm and dry  Neurological:      General: No focal deficit present        Mental Status: She is alert and oriented to person, place, and time  Mental status is at baseline  Sensory: No sensory deficit  Motor: No weakness  Coordination: Coordination normal       Gait: Gait normal    Psychiatric:         Mood and Affect: Mood normal          Behavior: Behavior normal          Thought Content: Thought content normal          Judgment: Judgment normal            BMI Counseling: Body mass index is 38 94 kg/m²  The BMI is above normal  Nutrition recommendations include decreasing portion sizes, encouraging healthy choices of fruits and vegetables, decreasing fast food intake, consuming healthier snacks, limiting drinks that contain sugar, moderation in carbohydrate intake, increasing intake of lean protein, reducing intake of saturated and trans fat and reducing intake of cholesterol  Exercise recommendations include moderate physical activity 150 minutes/week and exercising 3-5 times per week

## 2021-03-15 ENCOUNTER — HOSPITAL ENCOUNTER (OUTPATIENT)
Dept: ULTRASOUND IMAGING | Facility: CLINIC | Age: 81
Discharge: HOME | End: 2021-03-15
Payer: MEDICARE

## 2021-03-15 DIAGNOSIS — E01.0 THYROMEGALY: ICD-10-CM

## 2021-03-15 DIAGNOSIS — E03.9 HYPOTHYROIDISM, UNSPECIFIED TYPE: ICD-10-CM

## 2021-03-15 PROCEDURE — 76536 US EXAM OF HEAD AND NECK: CPT

## 2021-03-25 ENCOUNTER — TELEPHONE (OUTPATIENT)
Dept: FAMILY MEDICINE CLINIC | Facility: MEDICAL CENTER | Age: 81
End: 2021-03-25

## 2021-03-25 DIAGNOSIS — R93.89 ABNORMAL THYROID ULTRASOUND: ICD-10-CM

## 2021-03-25 DIAGNOSIS — E01.0 THYROMEGALY: Primary | ICD-10-CM

## 2021-03-25 DIAGNOSIS — E04.1 THYROID NODULE: ICD-10-CM

## 2021-03-25 NOTE — TELEPHONE ENCOUNTER
----- Message from Batsheva Magdaleno MD sent at 3/25/2021  9:13 AM EDT -----  Notify thyroid ultrasound does show  2 nodules which require follow-up and possible biopsy  She needs to see surgeon  Advised Dr Cheyenne Lovett or Dr Maxine Howell   Give referral  Send back to me

## 2021-04-06 ENCOUNTER — TELEPHONE (OUTPATIENT)
Dept: HEMATOLOGY ONCOLOGY | Facility: CLINIC | Age: 81
End: 2021-04-06

## 2021-04-06 NOTE — TELEPHONE ENCOUNTER
New Patient Encounter    New Patient Intake Form   Patient Details:  April Sarah Chua  1940  [id number]    Background Information:  [address] starts by opening a telephone encounter and gathering the following information   Who is calling to schedule? If not self, relationship to patient? Self   Referring Provider Dr Gee Richardson   What is the diagnosis? Thyroid nodule   Is this diagnosis confirmed? Yes   When was the diagnosis? 3/15/21   Is there a confirmed diagnosis from a biopsy/tissue reviewed by pathology? na   Were outside slides requested? NA   Is patient aware of diagnosis? Yes   Is there a personal history and what kind? No   Is there a family history and what kind? No   Reason for visit? New Diagnosis   Have you had any imaging or labs done? If so: when, where? yes  US 3/15 [facility]   Are records in [facility]? yes   If patient has a prior history of breast cancer were old records obtained? NA   Was the patient told to bring a disk? No   Does the patient smoke or Vape? No   If yes, how many packs or cartridges per day? Scheduling Information:   Preferred [place]:  [place]     Are there any dates/time the patient cannot be seen? Miscellaneous:    After completing the above information, please route to Financial Counselor and the appropriate Nurse Navigator for review

## 2021-04-14 PROBLEM — E04.2 MULTIPLE THYROID NODULES: Status: ACTIVE | Noted: 2021-04-14

## 2021-04-15 ENCOUNTER — CONSULT (OUTPATIENT)
Dept: SURGICAL ONCOLOGY | Facility: CLINIC | Age: 81
End: 2021-04-15
Payer: MEDICARE

## 2021-04-15 ENCOUNTER — OFFICE VISIT (OUTPATIENT)
Dept: DERMATOLOGY | Facility: CLINIC | Age: 81
End: 2021-04-15
Payer: MEDICARE

## 2021-04-15 VITALS
SYSTOLIC BLOOD PRESSURE: 132 MMHG | TEMPERATURE: 98.1 F | WEIGHT: 230.5 LBS | RESPIRATION RATE: 14 BRPM | BODY MASS INDEX: 38.4 KG/M2 | HEIGHT: 65 IN | HEART RATE: 80 BPM | DIASTOLIC BLOOD PRESSURE: 66 MMHG

## 2021-04-15 VITALS — TEMPERATURE: 97.8 F

## 2021-04-15 DIAGNOSIS — Z85.828 HISTORY OF SKIN CANCER: ICD-10-CM

## 2021-04-15 DIAGNOSIS — E01.0 THYROMEGALY: ICD-10-CM

## 2021-04-15 DIAGNOSIS — R93.89 ABNORMAL THYROID ULTRASOUND: ICD-10-CM

## 2021-04-15 DIAGNOSIS — L82.1 SEBORRHEIC KERATOSIS: Primary | ICD-10-CM

## 2021-04-15 DIAGNOSIS — E04.1 THYROID NODULE: Primary | ICD-10-CM

## 2021-04-15 DIAGNOSIS — Z13.89 SCREENING FOR SKIN CONDITION: ICD-10-CM

## 2021-04-15 PROCEDURE — 99213 OFFICE O/P EST LOW 20 MIN: CPT | Performed by: DERMATOLOGY

## 2021-04-15 PROCEDURE — 99204 OFFICE O/P NEW MOD 45 MIN: CPT | Performed by: SURGERY

## 2021-04-15 NOTE — PROGRESS NOTES
Surgical Oncology Consult       [facility]  [facility] [address]  [facility] [id number]    [place]  1940  [id number]  [facility]  [facility] [place]  [address] [place] [id number]    Diagnoses and all orders for this visit:    Thyroid nodule  -     Ambulatory referral to Surgical Oncology  -     US guided thyroid biopsy with [facility]; Future    Thyromegaly  -     Ambulatory referral to Surgical Oncology    Abnormal thyroid ultrasound  -     Ambulatory referral to Surgical Oncology        Chief Complaint   Patient presents with    Consult       Return in about 3 weeks (around 5/6/2021) for Office Visit  Oncology History    No history exists  History of Present Illness:   77-year-old female who was recently found to have an enlarged thyroid  Thyroid ultrasound on March 15, 2021 revealed a right mid pole nodule measuring 2 8 x 1 3 x 2 2 cm  There was a 1 4 x 1 7 x 1 4 cm nodule in the right lower pole  There was another 1 4 x 1 2 x 1 5 cm nodule in the right lower pole  On the left there was a 4 9 x 2 5 x 4 cm mass  Biopsy of a large nodule in the right mid thyroid and the left thyroid were recommended  I personally reviewed the films  She denies any dysphagia or hoarseness  No history of radiation to the head or neck  No family history of thyroid cancer  Review of Systems  Complete ROS Surg Onc:   Constitutional: The patient denies new or recent history of general fatigue, no recent weight loss, no change in appetite  Eyes: No complaints of visual problems, no scleral icterus  ENT: no complaints of ear pain, no hoarseness, no difficulty swallowing,  no tinnitus and no new masses in head, oral cavity, or neck  Cardiovascular: No complaints of chest pain, no palpitations, no ankle edema  Respiratory: No complaints of shortness of breath, no cough  Gastrointestinal: No complaints of jaundice, no bloody stools, no pale stools  Genitourinary: No complaints of dysuria, no hematuria, no nocturia, no frequent urination, no urethral discharge  Musculoskeletal: No complaints of weakness, paralysis, joint stiffness or arthralgias  Integumentary: No complaints of rash, no new lesions  Neurological: No complaints of convulsions, no seizures, no dizziness  Hematologic/Lymphatic: No complaints of easy bruising  Endocrine:  No hot or cold intolerance  No polydipsia, polyphagia, or polyuria  Allergy/immunology:  No environmental allergies  No food allergies  Not immunocompromised  Skin:  No pallor or rash  No wound            Patient Active Problem List   Diagnosis    Paroxysmal SVT (supraventricular tachycardia) (HCC)    Essential hypertension    Dyslipidemia    LBBB (left bundle branch block)    Type 2 diabetes mellitus without complication, without long-term current use of insulin (HCC)    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)    Morbid obesity with BMI of 40 0-44 9, adult (HCC)    Acquired hypothyroidism    Stage 3 chronic kidney disease    Multiple thyroid nodules     Past Medical History:   Diagnosis Date    Chronic kidney disease     Diabetes mellitus ([address] )     Disease of thyroid gland     Hyperlipidemia     Hypertension     LBBB (left bundle branch block)     Palpitations     Seizures (HCC)     Stage 3 chronic kidney disease 2020     Past Surgical History:   Procedure Laterality Date    APPENDECTOMY       SECTION      HERNIA REPAIR      HIP SURGERY      KNEE ARTHROSCOPY Right     therapeutic     KNEE SURGERY Left     TONSILLECTOMY       Family History   Problem Relation Age of Onset    Arthritis Mother     Other Mother         CABG    Cancer Mother     Heart disease Mother     Osteoporosis Mother     Lung cancer Father     Pancreatic cancer Father      Social History     Socioeconomic History    Marital status: /Civil Union     Spouse name: Not on file    Number of children: Not on file    Years of education: Not on file    Highest education level: Not on file   Occupational History    Not on file   Social Needs    Financial resource strain: Not on file    Food insecurity     Worry: Not on file     Inability: Not on file   [employer] needs     Medical: Not on file     Non-medical: Not on file   Tobacco Use    Smoking status: Former Smoker    Smokeless tobacco: Never Used    Tobacco comment: quit 1976   Substance and Sexual Activity    Alcohol use: Not Currently    Drug use: Never    Sexual activity: Not on file   Lifestyle    Physical activity     Days per week: Not on file     Minutes per session: Not on file    Stress: Not on file   Relationships    Social connections     Talks on phone: Not on file     Gets together: Not on file     Attends Mandaeism service: Not on file     Active member of club or organization: Not on file     Attends meetings of clubs or organizations: Not on file     Relationship status: Not on file    Intimate partner violence     Fear of current or ex partner: Not on file     Emotionally abused: Not on file     Physically abused: Not on file     Forced sexual activity: Not on file   Other Topics Concern    Not on file   Social History Narrative    Caffeine use        Current Outpatient Medications:     Aspirin 81 MG EC tablet, Take 1 tablet by mouth daily, Disp: , Rfl:     Cyanocobalamin (B-12 PO), Take 1 capsule by mouth daily, Disp: , Rfl:     hydrochlorothiazide (HYDRODIURIL) 25 mg tablet, TAKE 1 TABLET BY MOUTH EVERY DAY, Disp: 90 tablet, Rfl: 1    levothyroxine 50 mcg tablet, TAKE 1 TABLET BY MOUTH EVERY DAY, Disp: 90 tablet, Rfl: 1    magnesium Oxide (MAG-OX) 400 mg TABS, Take 1 tablet by mouth daily, Disp: , Rfl:     metFORMIN (GLUCOPHAGE) 850 mg tablet, TAKE 1 TABLET BY MOUTH TWICE A DAY WITH BREAKFAST AND DINNER, Disp: 180 tablet, Rfl: 0    Multiple Vitamins-Minerals (CENTRUM SILVER 50+WOMEN PO), Take 1 tablet by mouth daily , Disp: , Rfl:     Potassium 99 MG TABS, Take 1 tablet by mouth daily, Disp: , Rfl:     quinapril (ACCUPRIL) 20 mg tablet, TAKE 1 TABLET BY MOUTH EVERY DAY, Disp: 90 tablet, Rfl: 1    simvastatin (ZOCOR) 10 mg tablet, TAKE 1 TABLET BY MOUTH EVERY DAY, Disp: 90 tablet, Rfl: 1    VITAMIN E BLEND PO, Take 1 capsule by mouth daily, Disp: , Rfl:   No Known Allergies  Vitals:    04/15/21 1405   BP: 132/66   Pulse: 80   Resp: 14   Temp: 98 1 °F (36 7 °C)       Physical Exam   Constitutional: General appearance: The Patient is well-developed and well-nourished who appears the stated age in no acute distress  Patient is pleasant and talkative  HEENT:  Normocephalic  Sclerae are anicteric  Mucous membranes are moist  Neck is supple without adenopathy  No JVD  The thyroid is nodular bilaterally, left greater than right  Chest: The lungs are clear to auscultation  Cardiac: Heart is regular rate  Abdomen: Abdomen is soft, non-tender, non-distended and without masses  Extremities: There is no clubbing or cyanosis  There is no edema  Symmetric  Neuro: Grossly nonfocal  Gait is normal      Lymphatic: No evidence of cervical adenopathy bilaterally  Skin: Warm, anicteric  Psych:  Patient is pleasant and talkative  Breasts:      Pathology:  [unfilled]    Labs:      Imaging    Thyroid ultrasound is as above  I personally reviewed the films  I reviewed the above laboratory and imaging data  Discussion/Summary:   47-year-old female with a multinodular goiter  She has a greater than 4 cm nodule on the left  There is also a suspicious nodule in the right  We discussed multiple treatment options including surgical resection as well as biopsy of the suspicious nodules  She is already on thyroid hormone for hypothyroidism for many years    I discussed that since the nodule in the left is greater than 4 cm, it should be removed regardless of the pathology results  We will plan on biopsying both suspicious nodules with Afirma  Further recommendations will be based on those results  If 1 of these nodules is malignant, I would recommend that she undergo total thyroidectomy rather than hemithyroidectomy  I will see her back once we have the results of the pathology  All of her and her 's questions were answered

## 2021-04-15 NOTE — PROGRESS NOTES
[address]  [address] [facility] [address]  [phone]  [phone]     MRN: [id number] : 1940  Encounter: [id number]  Patient Information:   Chief complaint:  6 Month checkup    History of present illness:  72-year-old female presents for repeat check for previous history of skin cancer no problems noted  Past Medical History:   Diagnosis Date    Chronic kidney disease     Diabetes mellitus ([facility] [address] )     Disease of thyroid gland     Hyperlipidemia     Hypertension     LBBB (left bundle branch block)     Palpitations     Seizures ([facility] [address] )     Stage 3 chronic kidney disease 2020     Past Surgical History:   Procedure Laterality Date    APPENDECTOMY       SECTION      HERNIA REPAIR      HIP SURGERY      KNEE ARTHROSCOPY Right     therapeutic     KNEE SURGERY Left     TONSILLECTOMY       Social History   Social History     Substance and Sexual Activity   Alcohol Use Not Currently     Social History     Substance and Sexual Activity   Drug Use Never     Social History     Tobacco Use   Smoking Status Former Smoker   Smokeless Tobacco Never Used   Tobacco Comment    quit 1976     Family History   Problem Relation Age of Onset    Arthritis Mother     Other Mother         CABG    Cancer Mother     Heart disease Mother     Osteoporosis Mother     Lung cancer Father     Pancreatic cancer Father      Meds/Allergies   No Known Allergies    Meds:  Prior to Admission medications    Medication Sig Start Date End Date Taking?  Authorizing Provider   Aspirin 81 MG EC tablet Take 1 tablet by mouth daily   Yes Historical Provider, MD   Cyanocobalamin (B-12 PO) Take 1 capsule by mouth daily   Yes Historical Provider, MD   hydrochlorothiazide (HYDRODIURIL) 25 mg tablet TAKE 1 TABLET BY MOUTH EVERY DAY 21  Yes Lizett Freeman MD   levothyroxine 50 mcg tablet TAKE 1 TABLET BY MOUTH EVERY DAY 21  Yes Lizett Freeman MD magnesium Oxide (MAG-OX) 400 mg TABS Take 1 tablet by mouth daily   Yes Historical Provider, MD   metFORMIN (GLUCOPHAGE) 850 mg tablet TAKE 1 TABLET BY MOUTH TWICE A DAY WITH BREAKFAST AND DINNER 1/20/21  Yes Keith Rush MD   Multiple Vitamins-Minerals (CENTRUM SILVER 50+WOMEN PO) Take 1 tablet by mouth daily    Yes Historical Provider, MD   Potassium 99 MG TABS Take 1 tablet by mouth daily   Yes Historical Provider, MD   quinapril (ACCUPRIL) 20 mg tablet TAKE 1 TABLET BY MOUTH EVERY DAY 12/22/20  Yes Keith Rush MD   simvastatin (ZOCOR) 10 mg tablet TAKE 1 TABLET BY MOUTH EVERY DAY 1/26/21  Yes Theresa Rizzo MD   VITAMIN E BLEND PO Take 1 capsule by mouth daily   Yes Historical Provider, MD       Subjective:     Review of Systems:    General: negative for - chills, fatigue, fever,  weight gain or weight loss  Psychological: negative for - anxiety, behavioral disorder, concentration difficulties, decreased libido, depression, irritability, memory difficulties, mood swings, sleep disturbances or suicidal ideation  ENT: negative for - hearing difficulties , nasal congestion, nasal discharge, oral lesions, sinus pain, sneezing, sore throat  Allergy and Immunology: negative for - hives, insect bite sensitivity,  Hematological and Lymphatic: negative for - bleeding problems, blood clots,bruising, swollen lymph nodes  Endocrine: negative for - hair pattern changes, hot flashes, malaise/lethargy, mood swings, palpitations, polydipsia/polyuria, skin changes, temperature intolerance or unexpected weight change  Respiratory: negative for - cough, hemoptysis, orthopnea, shortness of breath, or wheezing  Cardiovascular: negative for - chest pain, dyspnea on exertion, edema,  Gastrointestinal: negative for - abdominal pain, nausea/vomiting  Genito-Urinary: negative for - dysuria, incontinence, irregular/heavy menses or urinary frequency/urgency  Musculoskeletal: negative for - gait disturbance, joint pain, joint stiffness, joint swelling, muscle pain, muscular weakness  Dermatological:  As in HPI  Neurological: negative for confusion, dizziness, headaches, impaired coordination/balance, memory loss, numbness/tingling, seizures, speech problems, tremors or weakness       Objective:   Temp 97 8 °F (36 6 °C) (Temporal)     Physical Exam:    General Appearance:    Alert, cooperative, no distress   Head:    Normocephalic, without obvious abnormality, atraumatic           Skin:   A full skin exam was performed including scalp, head scalp, eyes, ears, nose, lips, neck, chest, axilla, abdomen, back, buttocks, bilateral upper extremities, bilateral lower extremities, hands, feet, fingers, toes, fingernails, and toenails previous sites skin cancer well healed without recurrence normal keratotic papules greasy stuck on appearance nothing else remarkable noted exam     Assessment:     1  Seborrheic keratosis     2  Screening for skin condition     3  History of skin cancer           Plan:   Seborrheic keratosis patient reassured these are normal growths we acquire with age no treatment needed  History of skin cancer in no recurrence nothing else atypical sunblock recommended follow-up in 6 months  Screening for dermatologic disorders nothing else of concern noted on complete exam follow-up in 6 months    Kodi Pinto MD  4/15/2021,11:26 AM    Portions of the record may have been created with voice recognition software   Occasional wrong word or "sound a like" substitutions may have occurred due to the inherent limitations of voice recognition software   Read the chart carefully and recognize, using context, where substitutions have occurred

## 2021-04-15 NOTE — NURSING NOTE
Call placed to patient to discuss upcoming appointment at [place] [address] radiology department and complete consultation with patient  Patient is having US guided thyroid biopsy  Reviewed patient's allergies , current anticoagulant medication of ASA 81mg present per patient but not required to stop per periprocedural management of coagulation status and hemostasis risk in percutaneous image guided procedure guidelines, also discussed the pre and post procedure expectations  Reminded patient of location and time expected for procedure, Patient expressed understanding by verbalizing and repeating instructions

## 2021-04-22 ENCOUNTER — HOSPITAL ENCOUNTER (OUTPATIENT)
Dept: RADIOLOGY | Facility: HOSPITAL | Age: 81
Discharge: HOME | End: 2021-04-22
Attending: SURGERY | Admitting: RADIOLOGY
Payer: MEDICARE

## 2021-04-22 ENCOUNTER — TRANSCRIBE ORDERS (OUTPATIENT)
Dept: RADIOLOGY | Facility: HOSPITAL | Age: 81
End: 2021-04-22

## 2021-04-22 DIAGNOSIS — E04.1 THYROID NODULE: ICD-10-CM

## 2021-04-22 PROCEDURE — 88172 CYTP DX EVAL FNA 1ST EA SITE: CPT | Performed by: PATHOLOGY

## 2021-04-22 PROCEDURE — 10005 FNA BX W/US GDN 1ST LES: CPT

## 2021-04-22 PROCEDURE — 10006 FNA BX W/US GDN EA ADDL: CPT

## 2021-04-22 PROCEDURE — 88173 CYTOPATH EVAL FNA REPORT: CPT | Performed by: PATHOLOGY

## 2021-04-22 RX ORDER — LIDOCAINE HYDROCHLORIDE 10 MG/ML
4 INJECTION, SOLUTION EPIDURAL; INFILTRATION; INTRACAUDAL; PERINEURAL ONCE
Status: COMPLETED | OUTPATIENT
Start: 2021-04-22 | End: 2021-04-22

## 2021-04-22 RX ADMIN — LIDOCAINE HYDROCHLORIDE 7 ML: 10 INJECTION, SOLUTION EPIDURAL; INFILTRATION; INTRACAUDAL; PERINEURAL at 09:53

## 2021-04-24 DIAGNOSIS — E11.9 TYPE 2 DIABETES MELLITUS WITHOUT COMPLICATION, WITHOUT LONG-TERM CURRENT USE OF INSULIN (HCC): ICD-10-CM

## 2021-05-06 ENCOUNTER — OFFICE VISIT (OUTPATIENT)
Dept: SURGICAL ONCOLOGY | Facility: CLINIC | Age: 81
End: 2021-05-06
Payer: MEDICARE

## 2021-05-06 VITALS
TEMPERATURE: 97.8 F | HEART RATE: 78 BPM | SYSTOLIC BLOOD PRESSURE: 116 MMHG | RESPIRATION RATE: 17 BRPM | BODY MASS INDEX: 38.32 KG/M2 | HEIGHT: 65 IN | WEIGHT: 230 LBS | DIASTOLIC BLOOD PRESSURE: 70 MMHG

## 2021-05-06 DIAGNOSIS — E04.2 MULTIPLE THYROID NODULES: Primary | ICD-10-CM

## 2021-05-06 PROCEDURE — 99214 OFFICE O/P EST MOD 30 MIN: CPT | Performed by: SURGERY

## 2021-05-06 RX ORDER — HYDROCODONE BITARTRATE AND ACETAMINOPHEN 5; 325 MG/1; MG/1
1 TABLET ORAL EVERY 6 HOURS PRN
Qty: 10 TABLET | Refills: 0 | Status: SHIPPED | OUTPATIENT
Start: 2021-05-06 | End: 2022-01-12

## 2021-05-06 NOTE — PROGRESS NOTES
Surgical Oncology Follow Up       [facility]  [facility] [place] Jamesameena GONZALEZ [id number]    [place]  1940  [id number]  [facility]  [facility]  [address] [id number]    Diagnoses and all orders for this visit:    Multiple thyroid nodules  -     Case request operating room: LEFT HEMITHYROIDECTOMY; Standing  -     Comprehensive metabolic panel; Future  -     CBC and differential; Future  -     EKG 12 lead; Future  -     XR chest pa & lateral; Future  -     HYDROcodone-acetaminophen (NORCO) 5-325 mg per tablet; Take 1 tablet by mouth every 6 (six) hours as needed for painMax Daily Amount: 4 tablets  -     TSH, 3rd generation; Future  -     T4, free; Future  -     T3; Future  -     Case request operating room: LEFT HEMITHYROIDECTOMY    Other orders  -     Incentive spirometry; Standing  -     Insert and maintain IV line; Standing  -     Void On-Call to O R ; Standing  -     Place sequential compression device; Standing  -     ceFAZolin (ANCEF) 2,000 mg in dextrose 5 % 100 mL IVPB        Chief Complaint   Patient presents with    Follow-up       No follow-ups on file  Oncology History    No history exists  History of Present Illness:  Patient returns after her thyroid biopsy  The right nodule was benign  The left 4 9 cm nodule was benign as well  She comes in to discuss further therapy  She has no dysphagia or hoarseness  She is feeling well  Review of Systems  Complete ROS Surg Onc:   Complete ROS Surg Onc:   Constitutional: The patient denies new or recent history of general fatigue, no recent weight loss, no change in appetite  Eyes: No complaints of visual problems, no scleral icterus  ENT: no complaints of ear pain, no hoarseness, no difficulty swallowing,  no tinnitus and no new masses in head, oral cavity, or neck  Cardiovascular: No complaints of chest pain, no palpitations, no ankle edema  Respiratory: No complaints of shortness of breath, no cough  Gastrointestinal: No complaints of jaundice, no bloody stools, no pale stools  Genitourinary: No complaints of dysuria, no hematuria, no nocturia, no frequent urination, no urethral discharge  Musculoskeletal: No complaints of weakness, paralysis, joint stiffness or arthralgias  Integumentary: No complaints of rash, no new lesions  Neurological: No complaints of convulsions, no seizures, no dizziness  Hematologic/Lymphatic: No complaints of easy bruising  Endocrine:  No hot or cold intolerance  No polydipsia, polyphagia, or polyuria  Allergy/immunology:  No environmental allergies  No food allergies  Not immunocompromised  Skin:  No pallor or rash  No wound          Patient Active Problem List   Diagnosis    Paroxysmal SVT (supraventricular tachycardia) (HCC)    Essential hypertension    Dyslipidemia    LBBB (left bundle branch block)    Type 2 diabetes mellitus without complication, without long-term current use of insulin (HCC)    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)    Morbid obesity with BMI of 40 0-44 9, adult (HCC)    Acquired hypothyroidism    Stage 3 chronic kidney disease (HCC)    Multiple thyroid nodules     Past Medical History:   Diagnosis Date    Chronic kidney disease     Diabetes mellitus ([facility] [address] )     Disease of thyroid gland     Hyperlipidemia     Hypertension     LBBB (left bundle branch block)     Palpitations     Seizures (HCC)     Stage 3 chronic kidney disease ([address] ) 2020     Past Surgical History:   Procedure Laterality Date    APPENDECTOMY       SECTION      HERNIA REPAIR      HIP SURGERY      KNEE ARTHROSCOPY Right     therapeutic     KNEE SURGERY Left     TONSILLECTOMY      US GUIDED THYROID BIOPSY  2021     Family History Problem Relation Age of Onset    Arthritis Mother     Other Mother         CABG    Cancer Mother     Heart disease Mother     Osteoporosis Mother     Lung cancer Father     Pancreatic cancer Father      Social History     Socioeconomic History    Marital status: /Civil Union     Spouse name: Not on file    Number of children: Not on file    Years of education: Not on file    Highest education level: Not on file   Occupational History    Not on file   Social Needs    Financial resource strain: Not on file    Food insecurity     Worry: Not on file     Inability: Not on file   [employer] needs     Medical: Not on file     Non-medical: Not on file   Tobacco Use    Smoking status: Former Smoker    Smokeless tobacco: Never Used    Tobacco comment: quit 1976   Substance and Sexual Activity    Alcohol use: Not Currently    Drug use: Never    Sexual activity: Not on file   Lifestyle    Physical activity     Days per week: Not on file     Minutes per session: Not on file    Stress: Not on file   Relationships    Social connections     Talks on phone: Not on file     Gets together: Not on file     Attends Denominational service: Not on file     Active member of club or organization: Not on file     Attends meetings of clubs or organizations: Not on file     Relationship status: Not on file    Intimate partner violence     Fear of current or ex partner: Not on file     Emotionally abused: Not on file     Physically abused: Not on file     Forced sexual activity: Not on file   Other Topics Concern    Not on file   Social History Narrative    Caffeine use        Current Outpatient Medications:     Aspirin 81 MG EC tablet, Take 1 tablet by mouth daily, Disp: , Rfl:     Cyanocobalamin (B-12 PO), Take 1 capsule by mouth daily, Disp: , Rfl:     hydrochlorothiazide (HYDRODIURIL) 25 mg tablet, TAKE 1 TABLET BY MOUTH EVERY DAY, Disp: 90 tablet, Rfl: 1    HYDROcodone-acetaminophen (NORCO) 5-325 mg per tablet, Take 1 tablet by mouth every 6 (six) hours as needed for painMax Daily Amount: 4 tablets, Disp: 10 tablet, Rfl: 0    levothyroxine 50 mcg tablet, TAKE 1 TABLET BY MOUTH EVERY DAY, Disp: 90 tablet, Rfl: 1    magnesium Oxide (MAG-OX) 400 mg TABS, Take 1 tablet by mouth daily, Disp: , Rfl:     metFORMIN (GLUCOPHAGE) 850 mg tablet, TAKE 1 TABLET BY MOUTH TWICE A DAY WITH BREAKFAST AND DINNER, Disp: 180 tablet, Rfl: 0    Multiple Vitamins-Minerals (CENTRUM SILVER 50+WOMEN PO), Take 1 tablet by mouth daily , Disp: , Rfl:     Potassium 99 MG TABS, Take 1 tablet by mouth daily, Disp: , Rfl:     quinapril (ACCUPRIL) 20 mg tablet, TAKE 1 TABLET BY MOUTH EVERY DAY, Disp: 90 tablet, Rfl: 1    simvastatin (ZOCOR) 10 mg tablet, TAKE 1 TABLET BY MOUTH EVERY DAY, Disp: 90 tablet, Rfl: 1    VITAMIN E BLEND PO, Take 1 capsule by mouth daily, Disp: , Rfl:   No Known Allergies  Vitals:    05/06/21 1244   BP: 116/70   Pulse: 78   Resp: 17   Temp: 97 8 °F (36 6 °C)       Physical Exam  Constitutional: General appearance: The Patient is well-developed and well-nourished who appears the stated age in no acute distress  Patient is pleasant and talkative  HEENT:  Normocephalic  Sclerae are anicteric  Mucous membranes are moist  Neck is supple without adenopathy  No JVD  Nodular thyroid bilaterally, left greater than right     Chest: The lungs are clear to auscultation  Cardiac: Heart is regular rate  Abdomen: Abdomen is soft, non-tender, non-distended and without masses  Extremities: There is no clubbing or cyanosis  There is no edema  Symmetric  Neuro: Grossly nonfocal  Gait is normal      Lymphatic: No evidence of cervical adenopathy bilaterally  No evidence of axillary adenopathy bilaterally  No evidence of inguinal adenopathy bilaterally  Skin: Warm, anicteric  Psych:  Patient is pleasant and talkative  Breasts:        Pathology:    Final Diagnosis   A-B   Thyroid, Right, Mid (ThinPrep and smear preparations):  Benign ([place] Category II) - See note  Benign follicular cells in monolayered sheets and macrofollicular arrangements, colloid and histiocytes  Findings are consistent with a benign follicular nodule      Satisfactory for evaluation      C-D  Thyroid, Left, Mid (ThinPrep and smear preparations):  Benign ([place] Category II) - See note  Benign follicular cells in monolayered sheets and macrofollicular arrangements, colloid and histiocytes  Findings are consistent with a benign follicular nodule      Satisfactory for evaluation      NOTE A-D: As reported in the [address] [place] [address] for Reporting Thyroid Cytopathology*, the diagnostic category of "benign/negative for malignancy" carries a 0-3% risk of malignancy being found in subsequent resections (in the few studies of patients with benign FNA results that were followed long-term, a false negative rate of 0-3% was reported), with the usual management being clinical follow-up supplemented by ultrasonography (US) as indicated  Repeat FNA may be indicated for nodules showing significant growth or developing US abnormalities  Ultimately, clinical/imaging correlation for this patient is needed in arriving at the actual management plan      *The [place] System for Reporting Thyroid Cytopathology, Delroy Fontaine ), 2018 (2nd ed )           Labs:      Imaging  Us Guided Thyroid Biopsy With Afirma    Result Date: 4/23/2021  Narrative: ULTRASOUND-GUIDED THYROID BIOPSY HISTORY: 68year-old with history of recent ultrasound demonstrating thyroid nodules  Patient presents with prescription for ultrasound-guided thyroid nodule(s) biopsy with Afirma sampling  COMPARISON: Ultrasound thyroid 3/15/2021 FINDINGS: Prior ultrasound images were reviewed  The previous described Nodule #1  Image 10   RIGHT midgland nodule measuring 2 8 x 1 3 x 2 2 cm and was targeted for today's biopsy The procedure was explained to the patient, including risks of hemorrhage, infection and local injury  Informed consent was freely obtained  Final standard "time-out" procedure performed  PROCEDURE: The neck was prepped and draped in normal sterile fashion  Under real-time ultrasound guidance and local anesthesia 5 passes with a 25 gauge needle were made through the right mid gland nodule today measuring 2 8 x 1 3 x 2 3 cm  Cytopathology was present and deemed the specimens adequate for evaluation  Under real-time ultrasound guidance and local anesthesia 5 passes with a 25 gauge needle were made through the left mid gland thyroid nodule today measuring 4 9 x 2 9 x 3 9 cm     Cytopathology was present and deemed the initial specimens inadequate for  evaluation therefore 5 additional passes were obtained  The patient tolerated the procedure well  Postprocedure instructions were provided for the patient  Impression: Status post technically successful ultrasound-guided bilateral thyroid nodule biopsies, awaiting pathology results, with samples obtained and reserved for Afirma testing (if necessary)  Procedure was performed by DIONTE Lombardo PA-C under the direct supervision of Dr Anne Baig  Workstation performed: [id number]     I reviewed the above laboratory and imaging data  Discussion/Summary:  68-year-old female with a multinodular goiter and a greater than 4 cm nodule on the left  Despite this being benign, since this is greater than 4 cm in size I have recommended that she undergo left thyroid lobectomy  Will obtain preoperative TSH and T4 levels  The risks of left thyroid lobectomy were explained and included bleeding, infection, recurrence, need for further surgery, wound complications, hypocalcemia and recurrent laryngeal nerve damage  Informed consent was obtained  We will schedule this at our earliest mutual convenience  She is agreeable to this plan  All her questions were answered

## 2021-05-11 DIAGNOSIS — I10 HYPERTENSION, UNSPECIFIED TYPE: ICD-10-CM

## 2021-05-11 DIAGNOSIS — E11.9 TYPE 2 DIABETES MELLITUS WITHOUT COMPLICATION, WITHOUT LONG-TERM CURRENT USE OF INSULIN (HCC): ICD-10-CM

## 2021-05-11 DIAGNOSIS — E03.9 HYPOTHYROIDISM, UNSPECIFIED TYPE: ICD-10-CM

## 2021-05-12 RX ORDER — LEVOTHYROXINE SODIUM 0.05 MG/1
TABLET ORAL
Qty: 90 TABLET | Refills: 1 | Status: SHIPPED | OUTPATIENT
Start: 2021-05-12 | End: 2022-01-19

## 2021-05-13 RX ORDER — QUINAPRIL 20 MG/1
TABLET ORAL
Qty: 90 TABLET | Refills: 1 | Status: SHIPPED | OUTPATIENT
Start: 2021-05-13 | End: 2021-12-08

## 2021-05-24 ENCOUNTER — OFFICE VISIT (OUTPATIENT)
Dept: LAB | Facility: HOSPITAL | Age: 81
End: 2021-05-24
Attending: SURGERY
Payer: MEDICARE

## 2021-05-24 ENCOUNTER — HOSPITAL ENCOUNTER (OUTPATIENT)
Dept: RADIOLOGY | Facility: HOSPITAL | Age: 81
Discharge: HOME | End: 2021-05-24
Attending: SURGERY
Payer: MEDICARE

## 2021-05-24 ENCOUNTER — APPOINTMENT (OUTPATIENT)
Dept: LAB | Facility: HOSPITAL | Age: 81
End: 2021-05-24
Attending: SURGERY
Payer: MEDICARE

## 2021-05-24 DIAGNOSIS — E04.2 MULTIPLE THYROID NODULES: ICD-10-CM

## 2021-05-24 LAB
ALBUMIN SERPL BCP-MCNC: 3.7 G/DL (ref 3.5–5)
ALP SERPL-CCNC: 69 U/L (ref 46–116)
ALT SERPL W P-5'-P-CCNC: 20 U/L (ref 12–78)
ANION GAP SERPL CALCULATED.3IONS-SCNC: 8 MMOL/L (ref 4–13)
AST SERPL W P-5'-P-CCNC: 14 U/L (ref 5–45)
ATRIAL RATE: 74 BPM
BASOPHILS # BLD AUTO: 0.04 THOUSANDS/ΜL (ref 0–0.1)
BASOPHILS NFR BLD AUTO: 1 % (ref 0–1)
BILIRUB SERPL-MCNC: 0.5 MG/DL (ref 0.2–1)
BUN SERPL-MCNC: 21 MG/DL (ref 5–25)
CALCIUM SERPL-MCNC: 9.2 MG/DL (ref 8.3–10.1)
CHLORIDE SERPL-SCNC: 102 MMOL/L (ref 100–108)
CO2 SERPL-SCNC: 31 MMOL/L (ref 21–32)
CREAT SERPL-MCNC: 0.94 MG/DL (ref 0.6–1.3)
EOSINOPHIL # BLD AUTO: 0.15 THOUSAND/ΜL (ref 0–0.61)
EOSINOPHIL NFR BLD AUTO: 2 % (ref 0–6)
ERYTHROCYTE [DISTWIDTH] IN BLOOD BY AUTOMATED COUNT: 13.3 % (ref 11.6–15.1)
GFR SERPL CREATININE-BSD FRML MDRD: 58 ML/MIN/1.73SQ M
GLUCOSE P FAST SERPL-MCNC: 144 MG/DL (ref 65–99)
HCT VFR BLD AUTO: 40.8 % (ref 34.8–46.1)
HGB BLD-MCNC: 13.2 G/DL (ref 11.5–15.4)
IMM GRANULOCYTES # BLD AUTO: 0.03 THOUSAND/UL (ref 0–0.2)
IMM GRANULOCYTES NFR BLD AUTO: 1 % (ref 0–2)
LYMPHOCYTES # BLD AUTO: 1.57 THOUSANDS/ΜL (ref 0.6–4.47)
LYMPHOCYTES NFR BLD AUTO: 24 % (ref 14–44)
MCH RBC QN AUTO: 29.3 PG (ref 26.8–34.3)
MCHC RBC AUTO-ENTMCNC: 32.4 G/DL (ref 31.4–37.4)
MCV RBC AUTO: 91 FL (ref 82–98)
MONOCYTES # BLD AUTO: 0.44 THOUSAND/ΜL (ref 0.17–1.22)
MONOCYTES NFR BLD AUTO: 7 % (ref 4–12)
NEUTROPHILS # BLD AUTO: 4.23 THOUSANDS/ΜL (ref 1.85–7.62)
NEUTS SEG NFR BLD AUTO: 65 % (ref 43–75)
NRBC BLD AUTO-RTO: 0 /100 WBCS
P AXIS: 51 DEGREES
PLATELET # BLD AUTO: 211 THOUSANDS/UL (ref 149–390)
PMV BLD AUTO: 9.8 FL (ref 8.9–12.7)
POTASSIUM SERPL-SCNC: 4 MMOL/L (ref 3.5–5.3)
PR INTERVAL: 152 MS
PROT SERPL-MCNC: 7.3 G/DL (ref 6.4–8.2)
QRS AXIS: -26 DEGREES
QRSD INTERVAL: 134 MS
QT INTERVAL: 432 MS
QTC INTERVAL: 479 MS
RBC # BLD AUTO: 4.51 MILLION/UL (ref 3.81–5.12)
SODIUM SERPL-SCNC: 141 MMOL/L (ref 136–145)
T WAVE AXIS: 69 DEGREES
T3 SERPL-MCNC: 0.9 NG/ML (ref 0.6–1.8)
T4 FREE SERPL-MCNC: 1.15 NG/DL (ref 0.76–1.46)
TSH SERPL DL<=0.05 MIU/L-ACNC: 0.45 UIU/ML (ref 0.36–3.74)
VENTRICULAR RATE: 74 BPM
WBC # BLD AUTO: 6.46 THOUSAND/UL (ref 4.31–10.16)

## 2021-05-24 PROCEDURE — 84439 ASSAY OF FREE THYROXINE: CPT

## 2021-05-24 PROCEDURE — 84443 ASSAY THYROID STIM HORMONE: CPT

## 2021-05-24 PROCEDURE — 80053 COMPREHEN METABOLIC PANEL: CPT

## 2021-05-24 PROCEDURE — 71046 X-RAY EXAM CHEST 2 VIEWS: CPT

## 2021-05-24 PROCEDURE — 36415 COLL VENOUS BLD VENIPUNCTURE: CPT

## 2021-05-24 PROCEDURE — 93010 ELECTROCARDIOGRAM REPORT: CPT | Performed by: INTERNAL MEDICINE

## 2021-05-24 PROCEDURE — 93005 ELECTROCARDIOGRAM TRACING: CPT

## 2021-05-24 PROCEDURE — 85025 COMPLETE CBC W/AUTO DIFF WBC: CPT

## 2021-05-24 PROCEDURE — 84480 ASSAY TRIIODOTHYRONINE (T3): CPT

## 2021-06-04 NOTE — PRE-PROCEDURE INSTRUCTIONS
Pre-Surgery Instructions:   Medication Instructions    Aspirin 81 MG EC tablet Instructed to stop X1 week prior to surgery by surgeon    Cyanocobalamin (B-12 PO) Instructed patient per Anesthesia Guidelines   hydrochlorothiazide (HYDRODIURIL) 25 mg tablet Instructed to take per normal schedule except DOS    levothyroxine 50 mcg tablet Instructed to take per normal schedule including DOS with sips water    magnesium Oxide (MAG-OX) 400 mg TABS Instructed patient per Anesthesia Guidelines   metFORMIN (GLUCOPHAGE) 850 mg tablet Instructed to take per normal schedule except DOS    Multiple Vitamins-Minerals (CENTRUM SILVER 50+WOMEN PO) Instructed patient per Anesthesia Guidelines   Potassium 99 MG TABS Instructed patient per Anesthesia Guidelines   quinapril (ACCUPRIL) 20 mg tablet Instructed to take per normal schedule @ Bedtime    simvastatin (ZOCOR) 10 mg tablet Instructed to take per normal schedule @ Bedtime    VITAMIN E BLEND PO Instructed patient per Anesthesia Guidelines  Pre op instructions per My Surgical Experience booklet,medications per anesthesia guidelines and showering instructions per Jamin Rust protocol reviewed-Patient has CHG  Pt  Verbalized understanding of current visitor restrictions  Instructed to avoid all ASA/NSAIDs and OTC Vit/Supp from now until after surgery  Tylenol ok prn  Pt  Verbalized an understanding of all instructions reviewed and offers no concerns at this time

## 2021-06-07 ENCOUNTER — ANESTHESIA EVENT (OUTPATIENT)
Dept: PERIOP | Facility: HOSPITAL | Age: 81
End: 2021-06-07
Payer: MEDICARE

## 2021-06-08 NOTE — ANESTHESIA PREPROCEDURE EVALUATION
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)   (+) Type 2 diabetes mellitus without complication, without long-term current use of insulin (HCC)      /RENAL   (+) Stage 3 chronic kidney disease (HCC)        Physical Exam    Airway    Mallampati score: I  TM Distance: >3 FB  Neck ROM: full     Dental   Comment: edentulous,     Cardiovascular      Pulmonary      Other Findings        Anesthesia Plan  ASA Score- 3     Anesthesia Type- general with ASA Monitors  Additional Monitors:   Airway Plan: ETT  Comment: Patient seen and examined  History reviewed  Patient to be done under general anesthesia with ETT and routine monitors  Risks discussed with the patient  Consent obtained          Plan Factors-Exercise tolerance (METS): >4 METS  Chart reviewed  Existing labs reviewed  Patient summary reviewed  Induction- intravenous  Postoperative Plan- Plan for postoperative opioid use  Planned trial extubation    Informed Consent- Anesthetic plan and risks discussed with patient  I personally reviewed this patient with the CRNA  Discussed and agreed on the Anesthesia Plan with the DEIRDRE James

## 2021-06-09 ENCOUNTER — ANESTHESIA (OUTPATIENT)
Dept: PERIOP | Facility: HOSPITAL | Age: 81
End: 2021-06-09
Payer: MEDICARE

## 2021-06-09 ENCOUNTER — HOSPITAL ENCOUNTER (OUTPATIENT)
Facility: HOSPITAL | Age: 81
Setting detail: OUTPATIENT SURGERY
Discharge: HOME | End: 2021-06-09
Attending: SURGERY | Admitting: SURGERY
Payer: MEDICARE

## 2021-06-09 VITALS
DIASTOLIC BLOOD PRESSURE: 63 MMHG | HEART RATE: 64 BPM | OXYGEN SATURATION: 98 % | TEMPERATURE: 97.1 F | SYSTOLIC BLOOD PRESSURE: 148 MMHG | RESPIRATION RATE: 18 BRPM | HEIGHT: 65 IN | WEIGHT: 230 LBS | BODY MASS INDEX: 38.32 KG/M2

## 2021-06-09 DIAGNOSIS — E04.2 MULTIPLE THYROID NODULES: ICD-10-CM

## 2021-06-09 LAB
GLUCOSE SERPL-MCNC: 158 MG/DL (ref 65–140)
GLUCOSE SERPL-MCNC: 158 MG/DL (ref 65–140)

## 2021-06-09 PROCEDURE — 99024 POSTOP FOLLOW-UP VISIT: CPT | Performed by: SURGERY

## 2021-06-09 PROCEDURE — 82948 REAGENT STRIP/BLOOD GLUCOSE: CPT

## 2021-06-09 PROCEDURE — 88307 TISSUE EXAM BY PATHOLOGIST: CPT | Performed by: PATHOLOGY

## 2021-06-09 PROCEDURE — 60220 PARTIAL REMOVAL OF THYROID: CPT | Performed by: SURGERY

## 2021-06-09 RX ORDER — PROMETHAZINE HYDROCHLORIDE 25 MG/ML
12.5 INJECTION, SOLUTION INTRAMUSCULAR; INTRAVENOUS ONCE AS NEEDED
Status: DISCONTINUED | OUTPATIENT
Start: 2021-06-09 | End: 2021-06-09 | Stop reason: HOSPADM

## 2021-06-09 RX ORDER — ROCURONIUM BROMIDE 10 MG/ML
INJECTION, SOLUTION INTRAVENOUS AS NEEDED
Status: DISCONTINUED | OUTPATIENT
Start: 2021-06-09 | End: 2021-06-09

## 2021-06-09 RX ORDER — CEFAZOLIN SODIUM 2 G/50ML
2000 SOLUTION INTRAVENOUS ONCE
Status: COMPLETED | OUTPATIENT
Start: 2021-06-09 | End: 2021-06-09

## 2021-06-09 RX ORDER — MIDAZOLAM HYDROCHLORIDE 2 MG/2ML
INJECTION, SOLUTION INTRAMUSCULAR; INTRAVENOUS AS NEEDED
Status: DISCONTINUED | OUTPATIENT
Start: 2021-06-09 | End: 2021-06-09

## 2021-06-09 RX ORDER — MAGNESIUM HYDROXIDE 1200 MG/15ML
LIQUID ORAL AS NEEDED
Status: DISCONTINUED | OUTPATIENT
Start: 2021-06-09 | End: 2021-06-09 | Stop reason: HOSPADM

## 2021-06-09 RX ORDER — DEXAMETHASONE SODIUM PHOSPHATE 10 MG/ML
INJECTION, SOLUTION INTRAMUSCULAR; INTRAVENOUS AS NEEDED
Status: DISCONTINUED | OUTPATIENT
Start: 2021-06-09 | End: 2021-06-09

## 2021-06-09 RX ORDER — FENTANYL CITRATE/PF 50 MCG/ML
25 SYRINGE (ML) INJECTION
Status: COMPLETED | OUTPATIENT
Start: 2021-06-09 | End: 2021-06-09

## 2021-06-09 RX ORDER — EPHEDRINE SULFATE 50 MG/ML
INJECTION INTRAVENOUS AS NEEDED
Status: DISCONTINUED | OUTPATIENT
Start: 2021-06-09 | End: 2021-06-09

## 2021-06-09 RX ORDER — ALBUTEROL SULFATE 2.5 MG/3ML
2.5 SOLUTION RESPIRATORY (INHALATION) ONCE AS NEEDED
Status: DISCONTINUED | OUTPATIENT
Start: 2021-06-09 | End: 2021-06-09 | Stop reason: HOSPADM

## 2021-06-09 RX ORDER — FENTANYL CITRATE 50 UG/ML
INJECTION, SOLUTION INTRAMUSCULAR; INTRAVENOUS AS NEEDED
Status: DISCONTINUED | OUTPATIENT
Start: 2021-06-09 | End: 2021-06-09

## 2021-06-09 RX ORDER — MEPERIDINE HYDROCHLORIDE 25 MG/ML
12.5 INJECTION INTRAMUSCULAR; INTRAVENOUS; SUBCUTANEOUS ONCE
Status: DISCONTINUED | OUTPATIENT
Start: 2021-06-09 | End: 2021-06-09 | Stop reason: HOSPADM

## 2021-06-09 RX ORDER — SODIUM CHLORIDE, SODIUM LACTATE, POTASSIUM CHLORIDE, CALCIUM CHLORIDE 600; 310; 30; 20 MG/100ML; MG/100ML; MG/100ML; MG/100ML
125 INJECTION, SOLUTION INTRAVENOUS CONTINUOUS
Status: DISCONTINUED | OUTPATIENT
Start: 2021-06-09 | End: 2021-06-09 | Stop reason: HOSPADM

## 2021-06-09 RX ORDER — GLYCOPYRROLATE 0.2 MG/ML
INJECTION INTRAMUSCULAR; INTRAVENOUS AS NEEDED
Status: DISCONTINUED | OUTPATIENT
Start: 2021-06-09 | End: 2021-06-09

## 2021-06-09 RX ORDER — LIDOCAINE HYDROCHLORIDE 10 MG/ML
0.5 INJECTION, SOLUTION EPIDURAL; INFILTRATION; INTRACAUDAL; PERINEURAL ONCE AS NEEDED
Status: DISCONTINUED | OUTPATIENT
Start: 2021-06-09 | End: 2021-06-09 | Stop reason: HOSPADM

## 2021-06-09 RX ORDER — NEOSTIGMINE METHYLSULFATE 1 MG/ML
INJECTION INTRAVENOUS AS NEEDED
Status: DISCONTINUED | OUTPATIENT
Start: 2021-06-09 | End: 2021-06-09

## 2021-06-09 RX ORDER — LIDOCAINE HYDROCHLORIDE 10 MG/ML
INJECTION, SOLUTION EPIDURAL; INFILTRATION; INTRACAUDAL; PERINEURAL AS NEEDED
Status: DISCONTINUED | OUTPATIENT
Start: 2021-06-09 | End: 2021-06-09

## 2021-06-09 RX ORDER — HYDROMORPHONE HCL/PF 1 MG/ML
0.5 SYRINGE (ML) INJECTION
Status: DISCONTINUED | OUTPATIENT
Start: 2021-06-09 | End: 2021-06-09 | Stop reason: HOSPADM

## 2021-06-09 RX ORDER — PROPOFOL 10 MG/ML
INJECTION, EMULSION INTRAVENOUS AS NEEDED
Status: DISCONTINUED | OUTPATIENT
Start: 2021-06-09 | End: 2021-06-09

## 2021-06-09 RX ORDER — ONDANSETRON 2 MG/ML
4 INJECTION INTRAMUSCULAR; INTRAVENOUS ONCE AS NEEDED
Status: DISCONTINUED | OUTPATIENT
Start: 2021-06-09 | End: 2021-06-09 | Stop reason: HOSPADM

## 2021-06-09 RX ORDER — ACETAMINOPHEN 325 MG/1
650 TABLET ORAL EVERY 6 HOURS PRN
Status: DISCONTINUED | OUTPATIENT
Start: 2021-06-09 | End: 2021-06-09 | Stop reason: HOSPADM

## 2021-06-09 RX ORDER — BUPIVACAINE HYDROCHLORIDE 2.5 MG/ML
INJECTION, SOLUTION EPIDURAL; INFILTRATION; INTRACAUDAL AS NEEDED
Status: DISCONTINUED | OUTPATIENT
Start: 2021-06-09 | End: 2021-06-09 | Stop reason: HOSPADM

## 2021-06-09 RX ORDER — LABETALOL 20 MG/4 ML (5 MG/ML) INTRAVENOUS SYRINGE
5
Status: DISCONTINUED | OUTPATIENT
Start: 2021-06-09 | End: 2021-06-09 | Stop reason: HOSPADM

## 2021-06-09 RX ORDER — OXYCODONE HYDROCHLORIDE 5 MG/1
5 TABLET ORAL EVERY 4 HOURS PRN
Status: DISCONTINUED | OUTPATIENT
Start: 2021-06-09 | End: 2021-06-09 | Stop reason: HOSPADM

## 2021-06-09 RX ADMIN — PHENYLEPHRINE HYDROCHLORIDE 100 MCG: 10 INJECTION INTRAVENOUS at 10:07

## 2021-06-09 RX ADMIN — GLYCOPYRROLATE 0.4 MG: 0.2 INJECTION, SOLUTION INTRAMUSCULAR; INTRAVENOUS at 10:28

## 2021-06-09 RX ADMIN — FENTANYL CITRATE 25 MCG: 50 INJECTION INTRAMUSCULAR; INTRAVENOUS at 11:04

## 2021-06-09 RX ADMIN — FENTANYL CITRATE 50 MCG: 50 INJECTION, SOLUTION INTRAMUSCULAR; INTRAVENOUS at 09:20

## 2021-06-09 RX ADMIN — FENTANYL CITRATE 25 MCG: 50 INJECTION INTRAMUSCULAR; INTRAVENOUS at 10:59

## 2021-06-09 RX ADMIN — NEOSTIGMINE METHYLSULFATE 3 MG: 1 INJECTION INTRAVENOUS at 10:28

## 2021-06-09 RX ADMIN — DEXAMETHASONE SODIUM PHOSPHATE 4 MG: 10 INJECTION, SOLUTION INTRAMUSCULAR; INTRAVENOUS at 09:30

## 2021-06-09 RX ADMIN — LIDOCAINE HYDROCHLORIDE 50 MG: 10 INJECTION, SOLUTION EPIDURAL; INFILTRATION; INTRACAUDAL at 09:20

## 2021-06-09 RX ADMIN — FENTANYL CITRATE 50 MCG: 50 INJECTION, SOLUTION INTRAMUSCULAR; INTRAVENOUS at 10:03

## 2021-06-09 RX ADMIN — FENTANYL CITRATE 25 MCG: 50 INJECTION INTRAMUSCULAR; INTRAVENOUS at 11:13

## 2021-06-09 RX ADMIN — EPHEDRINE SULFATE 5 MG: 50 INJECTION, SOLUTION INTRAVENOUS at 10:07

## 2021-06-09 RX ADMIN — FENTANYL CITRATE 25 MCG: 50 INJECTION INTRAMUSCULAR; INTRAVENOUS at 11:09

## 2021-06-09 RX ADMIN — SODIUM CHLORIDE, SODIUM LACTATE, POTASSIUM CHLORIDE, AND CALCIUM CHLORIDE: .6; .31; .03; .02 INJECTION, SOLUTION INTRAVENOUS at 10:28

## 2021-06-09 RX ADMIN — CEFAZOLIN SODIUM 2000 MG: 2 SOLUTION INTRAVENOUS at 09:12

## 2021-06-09 RX ADMIN — OXYCODONE HYDROCHLORIDE 5 MG: 5 TABLET ORAL at 12:23

## 2021-06-09 RX ADMIN — PHENYLEPHRINE HYDROCHLORIDE 50 MCG: 10 INJECTION INTRAVENOUS at 09:32

## 2021-06-09 RX ADMIN — PROPOFOL 150 MG: 10 INJECTION, EMULSION INTRAVENOUS at 09:20

## 2021-06-09 RX ADMIN — PHENYLEPHRINE HYDROCHLORIDE 100 MCG: 10 INJECTION INTRAVENOUS at 09:29

## 2021-06-09 RX ADMIN — SODIUM CHLORIDE, SODIUM LACTATE, POTASSIUM CHLORIDE, AND CALCIUM CHLORIDE: .6; .31; .03; .02 INJECTION, SOLUTION INTRAVENOUS at 08:00

## 2021-06-09 RX ADMIN — MIDAZOLAM HYDROCHLORIDE 1 MG: 1 INJECTION, SOLUTION INTRAMUSCULAR; INTRAVENOUS at 09:08

## 2021-06-09 RX ADMIN — ROCURONIUM BROMIDE 50 MG: 10 SOLUTION INTRAVENOUS at 09:20

## 2021-06-09 NOTE — H&P
April Janette  1940  [id number]  Shima  43  [facility]  [facility] [place] [address] [address]  [address] [address]     Diagnoses and all orders for this visit:     Multiple thyroid nodules  -     Case request operating room: LEFT HEMITHYROIDECTOMY; Standing  -     Comprehensive metabolic panel; Future  -     CBC and differential; Future  -     EKG 12 lead; Future  -     XR chest pa & lateral; Future  -     HYDROcodone-acetaminophen (NORCO) 5-325 mg per tablet; Take 1 tablet by mouth every 6 (six) hours as needed for painMax Daily Amount: 4 tablets  -     TSH, 3rd generation; Future  -     T4, free; Future  -     T3; Future  -     Case request operating room: LEFT HEMITHYROIDECTOMY     Other orders  -     Incentive spirometry; Standing  -     Insert and maintain IV line; Standing  -     Void On-Call to O R ; Standing  -     Place sequential compression device; Standing  -     ceFAZolin (ANCEF) 2,000 mg in dextrose 5 % 100 mL IVPB               Chief Complaint   Patient presents with    Follow-up         No follow-ups on file             Oncology History     No history exists                History of Present Illness:  Patient returns after her thyroid biopsy  The right nodule was benign  The left 4 9 cm nodule was benign as well  She comes in to discuss further therapy  She has no dysphagia or hoarseness  She is feeling well      Review of Systems  Complete ROS Surg Onc:   Complete ROS Surg Onc:   Constitutional: The patient denies new or recent history of general fatigue, no recent weight loss, no change in appetite  Eyes: No complaints of visual problems, no scleral icterus  ENT: no complaints of ear pain, no hoarseness, no difficulty swallowing,  no tinnitus and no new masses in head, oral cavity, or neck  Cardiovascular: No complaints of chest pain, no palpitations, no ankle edema  Respiratory: No complaints of shortness of breath, no cough  Gastrointestinal: No complaints of jaundice, no bloody stools, no pale stools  Genitourinary: No complaints of dysuria, no hematuria, no nocturia, no frequent urination, no urethral discharge  Musculoskeletal: No complaints of weakness, paralysis, joint stiffness or arthralgias  Integumentary: No complaints of rash, no new lesions  Neurological: No complaints of convulsions, no seizures, no dizziness  Hematologic/Lymphatic: No complaints of easy bruising  Endocrine:  No hot or cold intolerance  No polydipsia, polyphagia, or polyuria  Allergy/immunology:  No environmental allergies  No food allergies  Not immunocompromised  Skin:  No pallor or rash    No wound                Patient Active Problem List   Diagnosis    Paroxysmal SVT (supraventricular tachycardia) (HCC)    Essential hypertension    Dyslipidemia    LBBB (left bundle branch block)    Type 2 diabetes mellitus without complication, without long-term current use of insulin (HCC)    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)    Morbid obesity with BMI of 40 0-44 9, adult (HCC)    Acquired hypothyroidism    Stage 3 chronic kidney disease (HCC)    Multiple thyroid nodules      Medical History        Past Medical History:   Diagnosis Date    Chronic kidney disease      Diabetes mellitus (HCC)      Disease of thyroid gland      Hyperlipidemia      Hypertension      LBBB (left bundle branch block)      Palpitations      Seizures (HCC)      Stage 3 chronic kidney disease ([address] ) 2020        Surgical History         Past Surgical History:   Procedure Laterality Date    APPENDECTOMY         SECTION        HERNIA REPAIR        HIP SURGERY        KNEE ARTHROSCOPY Right       therapeutic     KNEE SURGERY Left      TONSILLECTOMY        US GUIDED THYROID BIOPSY   2021              Family History   Problem Relation Age of Onset    Arthritis Mother   Diandra Courser Other Mother           CABG    Cancer Mother      Heart disease Mother      Osteoporosis Mother      Lung cancer Father      Pancreatic cancer Father        Social History               Socioeconomic History    Marital status: /Civil Union       Spouse name: Not on file    Number of children: Not on file    Years of education: Not on file    Highest education level: Not on file   Occupational History    Not on file   Social Needs    Financial resource strain: Not on file    Food insecurity       Worry: Not on file       Inability: Not on file    Transportation needs       Medical: Not on file       Non-medical: Not on file   Tobacco Use    Smoking status: Former Smoker    Smokeless tobacco: Never Used    Tobacco comment: quit 1976   Substance and Sexual Activity    Alcohol use: Not Currently    Drug use: Never    Sexual activity: Not on file   Lifestyle    Physical activity       Days per week: Not on file       Minutes per session: Not on file    Stress: Not on file   Relationships    Social connections       Talks on phone: Not on file       Gets together: Not on file       Attends [employer] service: Not on file       Active member of club or organization: Not on file       Attends meetings of clubs or organizations: Not on file       Relationship status: Not on file    Intimate partner violence       Fear of current or ex partner: Not on file       Emotionally abused: Not on file       Physically abused: Not on file       Forced sexual activity: Not on file   Other Topics Concern    Not on file   Social History Narrative     Caffeine use            Current Outpatient Medications:     Aspirin 81 MG EC tablet, Take 1 tablet by mouth daily, Disp: , Rfl:     Cyanocobalamin (B-12 PO), Take 1 capsule by mouth daily, Disp: , Rfl:     hydrochlorothiazide (HYDRODIURIL) 25 mg tablet, TAKE 1 TABLET BY MOUTH EVERY DAY, Disp: 90 tablet, Rfl: 1    HYDROcodone-acetaminophen ([address]) 5-325 mg per tablet, Take 1 tablet by mouth every 6 (six) hours as needed for painMax Daily Amount: 4 tablets, Disp: 10 tablet, Rfl: 0    levothyroxine 50 mcg tablet, TAKE 1 TABLET BY MOUTH EVERY DAY, Disp: 90 tablet, Rfl: 1    magnesium Oxide (MAG-OX) 400 mg TABS, Take 1 tablet by mouth daily, Disp: , Rfl:     metFORMIN (GLUCOPHAGE) 850 mg tablet, TAKE 1 TABLET BY MOUTH TWICE A DAY WITH BREAKFAST AND DINNER, Disp: 180 tablet, Rfl: 0    Multiple Vitamins-Minerals (CENTRUM SILVER 50+WOMEN PO), Take 1 tablet by mouth daily , Disp: , Rfl:     Potassium 99 MG TABS, Take 1 tablet by mouth daily, Disp: , Rfl:     quinapril (ACCUPRIL) 20 mg tablet, TAKE 1 TABLET BY MOUTH EVERY DAY, Disp: 90 tablet, Rfl: 1    simvastatin (ZOCOR) 10 mg tablet, TAKE 1 TABLET BY MOUTH EVERY DAY, Disp: 90 tablet, Rfl: 1    VITAMIN E BLEND PO, Take 1 capsule by mouth daily, Disp: , Rfl:   No Known Allergies      Vitals:     05/06/21 1244   BP: 116/70   Pulse: 78   Resp: 17   Temp: 97 8 °F (36 6 °C)         Physical Exam  Constitutional: General appearance: The Patient is well-developed and well-nourished who appears the stated age in no acute distress  Patient is pleasant and talkative      HEENT:  Normocephalic  Sclerae are anicteric  Mucous membranes are moist  Neck is supple without adenopathy  No JVD  Nodular thyroid bilaterally, left greater than right     Chest: The lungs are clear to auscultation      Cardiac: Heart is regular rate      Abdomen: Abdomen is soft, non-tender, non-distended and without masses      Extremities: There is no clubbing or cyanosis  There is no edema  Symmetric  Neuro: Grossly nonfocal  Gait is normal      Lymphatic: No evidence of cervical adenopathy bilaterally  No evidence of axillary adenopathy bilaterally  No evidence of inguinal adenopathy bilaterally      Skin: Warm, anicteric     Psych:  Patient is pleasant and talkative  Breasts:           Pathology:     Final Diagnosis   A-B   Thyroid, Right, Mid (ThinPrep and smear preparations):  Benign ([place]incoln Category II) - See note  Benign follicular cells in monolayered sheets and macrofollicular arrangements, colloid and histiocytes  Findings are consistent with a benign follicular nodule      Satisfactory for evaluation      C-D  Thyroid, Left, Mid (ThinPrep and smear preparations):  Benign (Lincoln Category II) - See note  Benign follicular cells in monolayered sheets and macrofollicular arrangements, colloid and histiocytes  Findings are consistent with a benign follicular nodule      Satisfactory for evaluation      NOTE A-D: As reported in the [address] [place] [address] for Reporting Thyroid Cytopathology*, the diagnostic category of "benign/negative for malignancy" carries a 0-3% risk of malignancy being found in subsequent resections (in the few studies of patients with benign FNA results that were followed long-term, a false negative rate of 0-3% was reported), with the usual management being clinical follow-up supplemented by ultrasonography (US) as indicated  Repeat FNA may be indicated for nodules showing significant growth or developing US abnormalities  Ultimately, clinical/imaging correlation for this patient is needed in arriving at the actual management plan      *The [place] System for Reporting Thyroid Cytopathology, Adry Rocha Katie Oh ), 2018 (2nd ed )             Labs:        Imaging  Us Guided Thyroid Biopsy With Afirma     Result Date: 4/23/2021  Narrative: ULTRASOUND-GUIDED THYROID BIOPSY HISTORY: 68year-old with history of recent ultrasound demonstrating thyroid nodules  Patient presents with prescription for ultrasound-guided thyroid nodule(s) biopsy with Afirma sampling  COMPARISON: Ultrasound thyroid 3/15/2021 FINDINGS: Prior ultrasound images were reviewed  The previous described Nodule #1  Image 10   RIGHT midgland nodule measuring 2 8 x 1 3 x 2 2 cm and was targeted for today's biopsy The procedure was explained to the patient, including risks of hemorrhage, infection and local injury  Informed consent was freely obtained  Final standard "time-out" procedure performed  PROCEDURE: The neck was prepped and draped in normal sterile fashion  Under real-time ultrasound guidance and local anesthesia 5 passes with a 25 gauge needle were made through the right mid gland nodule today measuring 2 8 x 1 3 x 2 3 cm  Cytopathology was present and deemed the specimens adequate for evaluation  Under real-time ultrasound guidance and local anesthesia 5 passes with a 25 gauge needle were made through the left mid gland thyroid nodule today measuring 4 9 x 2 9 x 3 9 cm     Cytopathology was present and deemed the initial specimens inadequate for  evaluation therefore 5 additional passes were obtained  The patient tolerated the procedure well  Postprocedure instructions were provided for the patient       Impression: Status post technically successful ultrasound-guided bilateral thyroid nodule biopsies, awaiting pathology results, with samples obtained and reserved for Afirma testing (if necessary)  Procedure was performed by DIONTE Ramirez PA-C under the direct supervision of Dr Alejandro Gatica  Workstation performed: [id number]      I reviewed the above laboratory and imaging data      Discussion/Summary:  81-year-old female with a multinodular goiter and a greater than 4 cm nodule on the left  Despite this being benign, since this is greater than 4 cm in size I have recommended that she undergo left thyroid lobectomy  Will obtain preoperative TSH and T4 levels  The risks of left thyroid lobectomy were explained and included bleeding, infection, recurrence, need for further surgery, wound complications, hypocalcemia and recurrent laryngeal nerve damage  Informed consent was obtained  We will schedule this at our earliest mutual convenience  She is agreeable to this plan  All her questions were answered

## 2021-06-09 NOTE — ANESTHESIA POSTPROCEDURE EVALUATION
Post-Op Assessment Note    CV Status:  Stable  Pain Score: 0    Pain management: adequate     Mental Status:  Alert and awake   Hydration Status:  Euvolemic   PONV Controlled:  Controlled   Airway Patency:  Patent      Post Op Vitals Reviewed: Yes      Staff: CRNA         No complications documented      /68 (06/09/21 1039)    Temp 97 6 °F (36 4 °C) (06/09/21 1039)    Pulse 86 (06/09/21 1039)   Resp 18 (06/09/21 1039)    SpO2 100 % (06/09/21 1039)

## 2021-06-09 NOTE — OP NOTE
OPERATIVE REPORT  PATIENT NAME: Reza Manning    :  1940  MRN: [id number]  Pt Location: AN OR ROOM 04    SURGERY DATE: 2021    Surgeon(s) and Role:     * Law Huitron MD - Primary     * Debara Apley, MD - Assisting    Preop Diagnosis:  Multiple thyroid nodules [E04 2]    Post-Op Diagnosis Codes:     * Multiple thyroid nodules [E04 2]    Procedure(s) (LRB):  HEMITHYROIDECTOMY (Left)    Specimen(s):  ID Type Source Tests Collected by Time Destination   1 : LEFT thyroid lobe  Tissue Thyroid, Left TISSUE EXAM Law Huitron MD 2021 [id number]        Estimated Blood Loss:   Minimal    Drains:  * No LDAs found *    Anesthesia Type:   General    Operative Indications:  Multiple thyroid nodules [E362]  72-year-old female with a large left thyroid nodule was greater than 4 cm  It was recommended that she undergo resection  Risks and benefits were explained  Informed consent was obtained  Patient was brought to the operating room  Operative Findings:  Recurrent laryngeal nerve was preserved along its course  Complications:   None    Procedure and Technique:  After identifying the patient, general anesthesia was achieved  Patient was prepped and draped in the usual sterile fashion  A time-out was performed  A Kocher incision was made 2 cm above the sternal notch  Using sharp dissection this was taken down through the skin, subcutaneous tissue and through the platysma  Subplatysmal flaps were then raised  Gelpi retractors were placed  Strap muscles were divided in the midline  We started with the left thyroid  The strap muscles were dissected away from the thyroid  Middle thyroid vein was divided with Harmonic scalpel  Superior pole vessels were isolated and divided with the Harmonic scalpel  Thyroid was rotated medially  The superior and inferior parathyroids were identified and dissected away from the thyroid  Recurrent laryngeal nerve was identified and preserved along its course   The thyroid continued to be rotated medially until we came to the ligament of Pagan  This was far away from the nerve  This was divided with the Harmonic scalpel  The isthmus was cauterized off the trachea  The thyroid was then divided at the junction of the right lobe and the isthmus  There was excellent hemostasis  Specimen was oriented and handed off the table  Wound was copiously irrigated  There was excellent hemostasis  The recurrent laryngeal nerve were stimulated and found to be functioning  Both parathyroids were identified and were viable  The wound was filled with saline  A Valsalva maneuver was performed and there was no evidence of bleeding or air bubbles  The irrigant was removed  Surgicel was placed in the resection bed  Strap muscles were approximated with a running 2-0 Vicryl suture  Gelpi retractors were removed  There was excellent hemostasis  Platysma was approximated with a running 3-0 Vicryl suture  Skin was approximated with a running 4-0 Monocryl suture in a subcuticular fashion  Steri-Strips and sterile dressings were applied  Patient was extubated having tolerated the procedure well  I was present and participated in all aspects of this procedure           I was present for the entire procedure    Patient Disposition:  PACU     SIGNATURE: Law Huitron MD  DATE: June 9, 2021  TIME: 10:23 AM

## 2021-06-09 NOTE — DISCHARGE INSTRUCTIONS
POST-OPERATIVE WOUND CARE INSTRUCTIONS    Your wound is closed with:   Sterile strips-white pieces of tape that hold your incision together      Wound care: You may remove your dressing after 24 hours   You may shower using soap and water to clean your wound  Gently pat it dry  You may redress your wound for comfort as needed  Activity:   Did not perform any heavy lifting or strenuous physical activity for 7 days  Your activity restrictions will be reevaluated at your postoperative visit  Medications: You may resume all your preoperative medications and diet  Pain medication as directed on the prescription given in the office  Other:   May use ice on the wound for 24-48 hours as needed for comfort  May place warm compresses to the neck after 48 hours for comfort  Call the office at [phone] if you have any of the followin  Redness, swelling, heat, unusual drainage or heavy bleeding from the wound     2  Fever greater than 101°F    3  Pain not relieved by the prescribed pain medication

## 2021-06-11 ENCOUNTER — TELEPHONE (OUTPATIENT)
Dept: HEMATOLOGY ONCOLOGY | Facility: CLINIC | Age: 81
End: 2021-06-11

## 2021-06-11 NOTE — TELEPHONE ENCOUNTER
The patient phoned and was returning a call regarding rescheduling the time of her follow up visit with Dr Sonny Peraza on 6/18  I did not see an obvious time to move her to  Please call the patient back to reschedule the time of her appointment  The best call back number is [phone]

## 2021-06-14 ENCOUNTER — TELEPHONE (OUTPATIENT)
Dept: SURGICAL ONCOLOGY | Facility: CLINIC | Age: 81
End: 2021-06-14

## 2021-06-14 NOTE — TELEPHONE ENCOUNTER
Called patient and discussed pathology results from recent surgery with Dr Katie Franco  Patient appreciative of phone call

## 2021-06-17 PROBLEM — E89.0 S/P PARTIAL THYROIDECTOMY: Status: ACTIVE | Noted: 2021-06-17

## 2021-06-18 ENCOUNTER — OFFICE VISIT (OUTPATIENT)
Dept: SURGICAL ONCOLOGY | Facility: CLINIC | Age: 81
End: 2021-06-18

## 2021-06-18 VITALS
WEIGHT: 229.6 LBS | HEART RATE: 78 BPM | RESPIRATION RATE: 17 BRPM | SYSTOLIC BLOOD PRESSURE: 150 MMHG | BODY MASS INDEX: 38.25 KG/M2 | DIASTOLIC BLOOD PRESSURE: 79 MMHG | TEMPERATURE: 97.5 F | OXYGEN SATURATION: 97 % | HEIGHT: 65 IN

## 2021-06-18 DIAGNOSIS — E04.2 MULTIPLE THYROID NODULES: Primary | ICD-10-CM

## 2021-06-18 DIAGNOSIS — E89.0 S/P PARTIAL THYROIDECTOMY: ICD-10-CM

## 2021-06-18 PROCEDURE — 99024 POSTOP FOLLOW-UP VISIT: CPT | Performed by: SURGERY

## 2021-06-18 NOTE — PROGRESS NOTES
Surgical Oncology Follow Up       [address] [facility] [address] [address] [phone]    April Janette  1940  [id number]  [address] [facility] SURGICAL ONCOLOGY Nancymarian Pratt  [address] [facility] [address]  [facility] [place] [address]  [phone]    Diagnoses and all orders for this visit:    Multiple thyroid nodules  -     US thyroid; Future    S/P partial thyroidectomy        Chief Complaint   Patient presents with    Post-op       Return in about 1 year (around 6/18/2022) for Office Visit, Imaging - See orders, with Shellie Perez  Oncology History    No history exists  Staging:    Treatment history:  Left thyroid lobectomy, June 2021  Current treatment:  Observation  Disease status: KERI    History of Present Illness:  Patient returns in follow-up of her thyroid lobectomy  She is doing well with no complaints  No dysphagia or hoarseness  She states occasionally it feels as if something get stuck in her throat  No voice complaints  Review of Systems  Complete ROS Surg Onc:   Complete ROS Surg Onc:   Constitutional: The patient denies new or recent history of general fatigue, no recent weight loss, no change in appetite  Eyes: No complaints of visual problems, no scleral icterus  ENT: no complaints of ear pain, no hoarseness, no difficulty swallowing,  no tinnitus and no new masses in head, oral cavity, or neck  Cardiovascular: No complaints of chest pain, no palpitations, no ankle edema  Respiratory: No complaints of shortness of breath, no cough  Gastrointestinal: No complaints of jaundice, no bloody stools, no pale stools  Genitourinary: No complaints of dysuria, no hematuria, no nocturia, no frequent urination, no urethral discharge  Musculoskeletal: No complaints of weakness, paralysis, joint stiffness or arthralgias  Integumentary: No complaints of rash, no new lesions  Neurological: No complaints of convulsions, no seizures, no dizziness  Hematologic/Lymphatic: No complaints of easy bruising  Endocrine:  No hot or cold intolerance  No polydipsia, polyphagia, or polyuria  Allergy/immunology:  No environmental allergies  No food allergies  Not immunocompromised  Skin:  No pallor or rash  No wound          Patient Active Problem List   Diagnosis    Paroxysmal SVT (supraventricular tachycardia) (HCC)    Essential hypertension    Dyslipidemia    LBBB (left bundle branch block)    Type 2 diabetes mellitus without complication, without long-term current use of insulin (HCC)    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)    Morbid obesity with BMI of 40 0-44 9, adult (HCC)    Acquired hypothyroidism    Stage 3 chronic kidney disease ([facility])    Multiple thyroid nodules    S/P partial thyroidectomy     Past Medical History:   Diagnosis Date    Chronic kidney disease     Stage III    Diabetes mellitus ([address] )     Disease of thyroid gland     Hyperlipidemia     Hypertension     Irregular heart beat     LBBB (left bundle branch block)     Palpitations     SVT (supraventricular tachycardia) ([address] ) 2019    Resolved Post Ablation     Past Surgical History:   Procedure Laterality Date    APPENDECTOMY      CARDIAC ELECTROPHYSIOLOGY MAPPING AND ABLATION      CARPAL TUNNEL RELEASE Right      SECTION      HERNIA REPAIR      JOINT REPLACEMENT Right     Knee    JOINT REPLACEMENT Right     Hip    KNEE ARTHROSCOPY Right     therapeutic     KNEE SURGERY Left     Cartilage repair    THYROID LOBECTOMY Left 2021    Procedure: HEMITHYROIDECTOMY;  Surgeon: Collette Chandra MD;  [facility];  Service: Surgical Oncology    TONSILLECTOMY      US GUIDED THYROID BIOPSY  2021     Family History   Problem Relation Age of Onset    Arthritis Mother     Other Mother         CABG    Cancer Mother     Heart disease Mother     Osteoporosis Mother     Lung cancer Father     Pancreatic cancer Father      Social History     Socioeconomic History    Marital status: /Civil Union     Spouse name: Not on file    Number of children: Not on file    Years of education: Not on file    Highest education level: Not on file   Occupational History    Not on file   Tobacco Use    Smoking status: Former Smoker     Quit date:      Years since quittin 4    Smokeless tobacco: Never Used   Vaping Use    Vaping Use: Never used   Substance and Sexual Activity    Alcohol use: Not Currently    Drug use: Never    Sexual activity: Not on file   Other Topics Concern    Not on file   Social History Narrative    Caffeine use      Social Determinants of Health     Financial Resource Strain:     Difficulty of Paying Living Expenses:    Food Insecurity:     Worried About Running Out of Food in the Last Year:     [address] in the Last Year:    Transportation Needs:     Lack of Transportation (Medical):      Lack of Transportation (Non-Medical):    Physical Activity:     Days of Exercise per Week:     Minutes of Exercise per Session:    Stress:     Feeling of Stress :    Social Connections:     Frequency of Communication with Friends and Family:     Frequency of Social Gatherings with Friends and Family:     Attends [employer]ist Services:     Active Member of Clubs or Organizations:     Attends Club or Organization Meetings:     Marital Status:    Intimate Partner Violence:     Fear of Current or Ex-Partner:     Emotionally Abused:     Physically Abused:     Sexually Abused:        Current Outpatient Medications:     Aspirin 81 MG EC tablet, Take 1 tablet by mouth daily, Disp: , Rfl:     Cyanocobalamin (B-12 PO), Take 1 capsule by mouth daily, Disp: , Rfl:     hydrochlorothiazide (HYDRODIURIL) 25 mg tablet, TAKE 1 TABLET BY MOUTH EVERY DAY (Patient taking differently: Take 25 mg by mouth daily in the early morning ), Disp: 90 tablet, Rfl: 1    HYDROcodone-acetaminophen (NORCO) 5-325 mg per tablet, Take 1 tablet by mouth every 6 (six) hours as needed for painMax Daily Amount: 4 tablets (Patient taking differently: Take 1 tablet by mouth every 6 (six) hours as needed for pain POST OP), Disp: 10 tablet, Rfl: 0    levothyroxine 50 mcg tablet, TAKE 1 TABLET BY MOUTH EVERY DAY (Patient taking differently: Take 50 mcg by mouth daily in the early morning ), Disp: 90 tablet, Rfl: 1    magnesium Oxide (MAG-OX) 400 mg TABS, Take 1 tablet by mouth daily, Disp: , Rfl:     metFORMIN (GLUCOPHAGE) 850 mg tablet, TAKE 1 TABLET BY MOUTH TWICE A DAY WITH BREAKFAST AND DINNER (Patient taking differently: Take 850 mg by mouth 2 (two) times a day with meals ), Disp: 180 tablet, Rfl: 0    Multiple Vitamins-Minerals (CENTRUM SILVER 50+WOMEN PO), Take 1 tablet by mouth daily , Disp: , Rfl:     Potassium 99 MG TABS, Take 1 tablet by mouth daily, Disp: , Rfl:     quinapril (ACCUPRIL) 20 mg tablet, TAKE 1 TABLET BY MOUTH EVERY DAY (Patient taking differently: Take 20 mg by mouth daily at bedtime ), Disp: 90 tablet, Rfl: 1    simvastatin (ZOCOR) 10 mg tablet, TAKE 1 TABLET BY MOUTH EVERY DAY (Patient taking differently: Take 10 mg by mouth daily at bedtime ), Disp: 90 tablet, Rfl: 1    VITAMIN E BLEND PO, Take 1 capsule by mouth daily, Disp: , Rfl:   No Known Allergies  Vitals:    06/18/21 1545   BP: 150/79   Pulse: 78   Resp: 17   Temp: 97 5 °F (36 4 °C)   SpO2: 97%       Physical Exam  Constitutional: General appearance: The Patient is well-developed and well-nourished who appears the stated age in no acute distress  Patient is pleasant and talkative  HEENT:  Normocephalic  Sclerae are anicteric  Mucous membranes are moist  Neck is supple without adenopathy  No JVD  Incision is C/D/I     Extremities: There is no clubbing or cyanosis  There is no edema  Symmetric    Neuro: Grossly nonfocal  Gait is normal      Skin: Warm, anicteric  Psych:  Patient is pleasant and talkative  Breasts:        Pathology:  Final Diagnosis   A  Left thyroid lobe (hemithyroidectomy):     - Benign follicular nodules / nodular hyperplasia (ranging from 0 4 cm to 3 9 cm)  - Negative for malignancy  Electronically signed by Hill Hess MD on 6/11/2021 at  4:19 PM         Labs:      Imaging  XR chest pa & lateral    Result Date: 5/28/2021  Narrative: CHEST INDICATION:   E04 2: Nontoxic multinodular goiter  COMPARISON:  Chest radiograph from 10/14/2015  EXAM PERFORMED/VIEWS:  XR CHEST PA & LATERAL  DUAL ENERGY SUBTRACTION  FINDINGS: Cardiomediastinal silhouette normal  Lungs clear  No effusion or pneumothorax  Osseous structures normal for age  Impression: No acute cardiopulmonary disease  Workstation performed: [id number]     I reviewed the above laboratory and imaging data  Discussion/Summary:  29-year-old female status post left thyroid lobectomy for a multinodular goiter  This was benign  She still has nodules on the right that will need to be followed  I will obtain a thyroid ultrasound in 1 year and see her again at that time for another clinical exam   She should continue to follow-up with TSH and T4 levels with her primary care physician  I discussed that I would obtain one 6 weeks after surgery to make sure thyroid hormone dose does not need to be adjusted  She will discuss this with her primary care physician  I will see her again in 1 year for another clinical exam with her thyroid ultrasound  All of her questions were answered

## 2021-08-11 ENCOUNTER — OFFICE VISIT (OUTPATIENT)
Dept: FAMILY MEDICINE CLINIC | Facility: MEDICAL CENTER | Age: 81
End: 2021-08-11
Payer: MEDICARE

## 2021-08-11 ENCOUNTER — APPOINTMENT (OUTPATIENT)
Dept: LAB | Facility: MEDICAL CENTER | Age: 81
End: 2021-08-11
Payer: MEDICARE

## 2021-08-11 VITALS
WEIGHT: 229 LBS | RESPIRATION RATE: 18 BRPM | SYSTOLIC BLOOD PRESSURE: 122 MMHG | DIASTOLIC BLOOD PRESSURE: 70 MMHG | TEMPERATURE: 97.9 F | HEIGHT: 65 IN | BODY MASS INDEX: 38.15 KG/M2

## 2021-08-11 DIAGNOSIS — I47.1 PAROXYSMAL SVT (SUPRAVENTRICULAR TACHYCARDIA) (HCC): ICD-10-CM

## 2021-08-11 DIAGNOSIS — N18.31 STAGE 3A CHRONIC KIDNEY DISEASE (HCC): ICD-10-CM

## 2021-08-11 DIAGNOSIS — E11.9 TYPE 2 DIABETES MELLITUS WITHOUT COMPLICATION, WITHOUT LONG-TERM CURRENT USE OF INSULIN (HCC): Primary | ICD-10-CM

## 2021-08-11 DIAGNOSIS — I10 ESSENTIAL HYPERTENSION: ICD-10-CM

## 2021-08-11 DIAGNOSIS — E03.9 HYPOTHYROIDISM, UNSPECIFIED TYPE: ICD-10-CM

## 2021-08-11 DIAGNOSIS — E55.9 VITAMIN D DEFICIENCY: ICD-10-CM

## 2021-08-11 DIAGNOSIS — E04.2 MULTIPLE THYROID NODULES: ICD-10-CM

## 2021-08-11 DIAGNOSIS — E11.9 TYPE 2 DIABETES MELLITUS WITHOUT COMPLICATION, WITHOUT LONG-TERM CURRENT USE OF INSULIN (HCC): ICD-10-CM

## 2021-08-11 DIAGNOSIS — E89.0 S/P PARTIAL THYROIDECTOMY: ICD-10-CM

## 2021-08-11 DIAGNOSIS — Z12.11 SCREENING FOR MALIGNANT NEOPLASM OF COLON: ICD-10-CM

## 2021-08-11 LAB
25(OH)D3 SERPL-MCNC: 34.8 NG/ML (ref 30–100)
ALBUMIN SERPL BCP-MCNC: 4.1 G/DL (ref 3.5–5)
ALP SERPL-CCNC: 71 U/L (ref 46–116)
ALT SERPL W P-5'-P-CCNC: 22 U/L (ref 12–78)
ANION GAP SERPL CALCULATED.3IONS-SCNC: 5 MMOL/L (ref 4–13)
AST SERPL W P-5'-P-CCNC: 22 U/L (ref 5–45)
BACTERIA UR QL AUTO: NORMAL /HPF
BASOPHILS # BLD AUTO: 0.06 THOUSANDS/ΜL (ref 0–0.1)
BASOPHILS NFR BLD AUTO: 1 % (ref 0–1)
BILIRUB SERPL-MCNC: 0.41 MG/DL (ref 0.2–1)
BILIRUB UR QL STRIP: NEGATIVE
BUN SERPL-MCNC: 19 MG/DL (ref 5–25)
CALCIUM SERPL-MCNC: 9.6 MG/DL (ref 8.3–10.1)
CHLORIDE SERPL-SCNC: 103 MMOL/L (ref 100–108)
CLARITY UR: CLEAR
CO2 SERPL-SCNC: 29 MMOL/L (ref 21–32)
COLOR UR: YELLOW
CREAT SERPL-MCNC: 0.9 MG/DL (ref 0.6–1.3)
EOSINOPHIL # BLD AUTO: 0.14 THOUSAND/ΜL (ref 0–0.61)
EOSINOPHIL NFR BLD AUTO: 2 % (ref 0–6)
ERYTHROCYTE [DISTWIDTH] IN BLOOD BY AUTOMATED COUNT: 13.4 % (ref 11.6–15.1)
GFR SERPL CREATININE-BSD FRML MDRD: 61 ML/MIN/1.73SQ M
GLUCOSE SERPL-MCNC: 108 MG/DL (ref 65–140)
GLUCOSE UR STRIP-MCNC: NEGATIVE MG/DL
HCT VFR BLD AUTO: 42.8 % (ref 34.8–46.1)
HGB BLD-MCNC: 13.8 G/DL (ref 11.5–15.4)
HGB UR QL STRIP.AUTO: NEGATIVE
HYALINE CASTS #/AREA URNS LPF: NORMAL /LPF
IMM GRANULOCYTES # BLD AUTO: 0.02 THOUSAND/UL (ref 0–0.2)
IMM GRANULOCYTES NFR BLD AUTO: 0 % (ref 0–2)
KETONES UR STRIP-MCNC: NEGATIVE MG/DL
LEUKOCYTE ESTERASE UR QL STRIP: ABNORMAL
LYMPHOCYTES # BLD AUTO: 2.56 THOUSANDS/ΜL (ref 0.6–4.47)
LYMPHOCYTES NFR BLD AUTO: 35 % (ref 14–44)
MCH RBC QN AUTO: 29.7 PG (ref 26.8–34.3)
MCHC RBC AUTO-ENTMCNC: 32.2 G/DL (ref 31.4–37.4)
MCV RBC AUTO: 92 FL (ref 82–98)
MONOCYTES # BLD AUTO: 0.42 THOUSAND/ΜL (ref 0.17–1.22)
MONOCYTES NFR BLD AUTO: 6 % (ref 4–12)
NEUTROPHILS # BLD AUTO: 4.12 THOUSANDS/ΜL (ref 1.85–7.62)
NEUTS SEG NFR BLD AUTO: 56 % (ref 43–75)
NITRITE UR QL STRIP: NEGATIVE
NON-SQ EPI CELLS URNS QL MICRO: NORMAL /HPF
NRBC BLD AUTO-RTO: 0 /100 WBCS
PH UR STRIP.AUTO: 6.5 [PH]
PLATELET # BLD AUTO: 250 THOUSANDS/UL (ref 149–390)
PMV BLD AUTO: 10.6 FL (ref 8.9–12.7)
POTASSIUM SERPL-SCNC: 3.7 MMOL/L (ref 3.5–5.3)
PROT SERPL-MCNC: 7.9 G/DL (ref 6.4–8.2)
PROT UR STRIP-MCNC: NEGATIVE MG/DL
RBC # BLD AUTO: 4.65 MILLION/UL (ref 3.81–5.12)
RBC #/AREA URNS AUTO: NORMAL /HPF
SL AMB POCT HEMOGLOBIN AIC: 6.6 (ref ?–6.5)
SODIUM SERPL-SCNC: 137 MMOL/L (ref 136–145)
SP GR UR STRIP.AUTO: 1.02 (ref 1–1.03)
TSH SERPL DL<=0.05 MIU/L-ACNC: 1.55 UIU/ML (ref 0.36–3.74)
UROBILINOGEN UR QL STRIP.AUTO: 0.2 E.U./DL
WBC # BLD AUTO: 7.32 THOUSAND/UL (ref 4.31–10.16)
WBC #/AREA URNS AUTO: NORMAL /HPF

## 2021-08-11 PROCEDURE — 80053 COMPREHEN METABOLIC PANEL: CPT

## 2021-08-11 PROCEDURE — 81001 URINALYSIS AUTO W/SCOPE: CPT

## 2021-08-11 PROCEDURE — 1123F ACP DISCUSS/DSCN MKR DOCD: CPT | Performed by: FAMILY MEDICINE

## 2021-08-11 PROCEDURE — 85025 COMPLETE CBC W/AUTO DIFF WBC: CPT

## 2021-08-11 PROCEDURE — 83036 HEMOGLOBIN GLYCOSYLATED A1C: CPT | Performed by: FAMILY MEDICINE

## 2021-08-11 PROCEDURE — 99214 OFFICE O/P EST MOD 30 MIN: CPT | Performed by: FAMILY MEDICINE

## 2021-08-11 PROCEDURE — G0439 PPPS, SUBSEQ VISIT: HCPCS | Performed by: FAMILY MEDICINE

## 2021-08-11 PROCEDURE — 36415 COLL VENOUS BLD VENIPUNCTURE: CPT

## 2021-08-11 PROCEDURE — 82306 VITAMIN D 25 HYDROXY: CPT

## 2021-08-11 PROCEDURE — 84443 ASSAY THYROID STIM HORMONE: CPT

## 2021-08-11 NOTE — ASSESSMENT & PLAN NOTE
Could be age related    GFR is stable in mid 46s    Continue good control DM, HTN, avoid NSAIDs, stay well hydrated

## 2021-08-11 NOTE — PROGRESS NOTES
Assessment and Plan:     Problem List Items Addressed This Visit        Endocrine    Type 2 diabetes mellitus without complication, without long-term current use of insulin ([facility] ) - Primary    Relevant Orders    POCT hemoglobin A1c           Preventive health issues were discussed with patient, and age appropriate screening tests were ordered as noted in patient's After Visit Summary  Personalized health advice and appropriate referrals for health education or preventive services given if needed, as noted in patient's After Visit Summary       History of Present Illness:     Patient presents for Medicare Annual Wellness visit    Patient Care Team:  Ted Khan MD as PCP - MD Deisy Vasques MD     Problem List:     Patient Active Problem List   Diagnosis    Paroxysmal [facility]raventricular tachycardia) [facility] )    Essential hypertension    Dyslipidemia    LBBB (left bundle branch block)    Type 2 diabetes mellitus without complication, without long-term current use of insulin ([facility])    Type 2 diabetes mellitus with mild nonproliferative retinopathy of both eyes without macular edema ([facility] )    Morbid obesity with BMI of 40 0-44 9, adult ([facility] )    Acquired hypothyroidism    Stage 3 [facility] disease [facility] )    Multiple thyroid nodules    S/P partial thyroidectomy      Past Medical and Surgical History:     Past Medical History:   Diagnosis Date    Chronic kidney disease     Stage III    Diabetes mellitus ([facility] )     Disease of thyroid gland     Hyperlipidemia     Hypertension     Irregular heart beat     LBBB (left bundle branch block)     Palpitations     SVT (supraventricular tachycardia) ([facility] ) 2019    Resolved Post Ablation     Past Surgical History:   Procedure Laterality Date    APPENDECTOMY      CARDIAC ELECTROPHYSIOLOGY MAPPING AND ABLATION  2019    CARPAL TUNNEL RELEASE Right      SECTION      HERNIA REPAIR      JOINT REPLACEMENT Right     Knee    JOINT REPLACEMENT Right     Hip    KNEE ARTHROSCOPY Right     therapeutic     KNEE SURGERY Left     Cartilage repair    THYROID LOBECTOMY Left 2021    Procedure: HEMITHYROIDECTOMY;  Surgeon: Sydnie Huitron MD;  [facility];  Service: Surgical Oncology    TONSILLECTOMY      US GUIDED THYROID BIOPSY  2021      Family History:     Family History   Problem Relation Age of Onset    Arthritis Mother     Other Mother         CABG    Cancer Mother     Heart disease Mother     Osteoporosis Mother     Lung cancer Father     Pancreatic cancer Father       Social History:     Social History     Socioeconomic History    Marital status: /Civil Union     Spouse name: None    Number of children: None    Years of education: None    Highest education level: None   Occupational History    None   Tobacco Use    Smoking status: Former Smoker     Quit date:      Years since quittin 6    Smokeless tobacco: Never Used   Vaping Use    Vaping Use: Never used   Substance and Sexual Activity    Alcohol use: Not Currently    Drug use: Never    Sexual activity: None   Other Topics Concern    None   Social History Narrative    Caffeine use      Social Determinants of Health     Financial Resource Strain:     Difficulty of Paying Living Expenses:    Food Insecurity:     Worried About Running Out of Food in the Last Year:     Ran Out of Food in the Last Year:    Transportation Needs:     Lack of Transportation (Medical):      Lack of Transportation (Non-Medical):    Physical Activity:     Days of Exercise per Week:     Minutes of Exercise per Session:    Stress:     Feeling of Stress :    Social Connections:     Frequency of Communication with Friends and Family:     Frequency of Social Gatherings with Friends and Family:     Attends Zoroastrianism Services:     Active Member of Clubs or Organizations:     Attends Club or Organization Meetings:     Marital Status:    Intimate Partner Violence:     Fear of Current or Ex-Partner:     Emotionally Abused:     Physically Abused:     Sexually Abused:       Medications and Allergies:     Current Outpatient Medications   Medication Sig Dispense Refill    Aspirin 81 MG EC tablet Take 1 tablet by mouth daily      hydrochlorothiazide (HYDRODIURIL) 25 mg tablet TAKE 1 TABLET BY MOUTH EVERY DAY (Patient taking differently: Take 25 mg by mouth daily in the early morning ) 90 tablet 1    levothyroxine 50 mcg tablet TAKE 1 TABLET BY MOUTH EVERY DAY (Patient taking differently: Take 50 mcg by mouth daily in the early morning ) 90 tablet 1    magnesium Oxide (MAG-OX) 400 mg TABS Take 1 tablet by mouth daily      metFORMIN (GLUCOPHAGE) 850 mg tablet TAKE 1 TABLET BY MOUTH TWICE A DAY WITH BREAKFAST AND DINNER (Patient taking differently: Take 850 mg by mouth 2 (two) times a day with meals ) 180 tablet 0    Multiple Vitamins-Minerals (CENTRUM SILVER 50+WOMEN PO) Take 1 tablet by mouth daily       Potassium 99 MG TABS Take 1 tablet by mouth daily      quinapril (ACCUPRIL) 20 mg tablet TAKE 1 TABLET BY MOUTH EVERY DAY (Patient taking differently: Take 20 mg by mouth daily at bedtime ) 90 tablet 1    simvastatin (ZOCOR) 10 mg tablet TAKE 1 TABLET BY MOUTH EVERY DAY (Patient taking differently: Take 10 mg by mouth daily at bedtime ) 90 tablet 1    Cyanocobalamin (B-12 PO) Take 1 capsule by mouth daily (Patient not taking: Reported on 8/11/2021)      HYDROcodone-acetaminophen (NORCO) 5-325 mg per tablet Take 1 tablet by mouth every 6 (six) hours as needed for painMax Daily Amount: 4 tablets (Patient not taking: Reported on 8/11/2021) 10 tablet 0    VITAMIN E BLEND PO Take 1 capsule by mouth daily (Patient not taking: Reported on 8/11/2021)       No current facility-administered medications for this visit       No Known Allergies   Immunizations:     Immunization History   Administered Date(s) Administered    Hep A, adult 01/28/2015, 07/28/2015    INFLUENZA 10/27/2018, 10/11/2019, 09/05/2020    Influenza, seasonal, injectable 11/15/2010, 10/16/2015    Pneumococcal Conjugate 13-Valent 07/18/2017    Pneumococcal Polysaccharide PPV23 07/12/2016    SARS-CoV-2 / COVID-19 mRNA IM (Pfizer-BioNTech) 01/22/2021, 02/12/2021    Zoster Vaccine Recombinant 09/08/2020      Health Maintenance:         Topic Date Due    Hepatitis C Screening  Never done         Topic Date Due    DTaP,Tdap,and Td Vaccines (1 - Tdap) Never done    Influenza Vaccine (1) 09/01/2021      Medicare Health Risk Assessment:     Temp 97 9 °F (36 6 °C)   Resp 18   Ht 5' 4 5" (1 638 m)   Wt 104 kg (229 lb)   BMI 38 70 kg/m²      April is here for her Subsequent Wellness visit  Health Risk Assessment:   Patient rates overall health as good  Patient feels that their physical health rating is same  Patient is satisfied with their life  Eyesight was rated as slightly worse  Hearing was rated as slightly worse  Patient feels that their emotional and mental health rating is same  Patients states they are never, rarely angry  Patient states they are never, rarely unusually tired/fatigued  Pain experienced in the last 7 days has been some  Patient states that she has experienced no weight loss or gain in last 6 months  Depression Screening:   PHQ-2 Score: 0      Fall Risk Screening: In the past year, patient has experienced: no history of falling in past year      Urinary Incontinence Screening:   Patient has not leaked urine accidently in the last six months  Home Safety:  Patient does not have trouble with stairs inside or outside of their home  Patient has working smoke alarms and has working carbon monoxide detector  Home safety hazards include: none  Nutrition:   Current diet is Regular, Diabetic and No Added Salt  Medications:   Patient is currently taking over-the-counter supplements  OTC medications include: see medication list  Patient is able to manage medications  Activities of Daily Living (ADLs)/Instrumental Activities of Daily Living (IADLs):   Walk and transfer into and out of bed and chair?: Yes  Dress and groom yourself?: Yes    Bathe or shower yourself?: Yes    Feed yourself? Yes  Do your laundry/housekeeping?: Yes  Manage your money, pay your bills and track your expenses?: Yes  Make your own meals?: Yes    Do your own shopping?: Yes    Previous Hospitalizations:   Any hospitalizations or ED visits within the last 12 months?: Yes    How many hospitalizations have you had in the last year?: 1-2    Advance Care Planning:   Living will: Yes    Durable POA for healthcare: Yes    Advanced directive: Yes      Cognitive Screening:   Provider or family/friend/caregiver concerned regarding cognition?: No    PREVENTIVE SCREENINGS      Cardiovascular Screening:    General: Screening Not Indicated and History Lipid Disorder      Diabetes Screening:     General: Screening Not Indicated and History Diabetes      Cervical Cancer Screening:    General: Screening Not Indicated      Lung Cancer Screening:     General: Screening Not Indicated    Screening, Brief Intervention, and Referral to Treatment (SBIRT)    Screening      AUDIT-C Screenin) How often did you have a drink containing alcohol in the past year? monthly or less  2) How many drinks did you have on a typical day when you were drinking in the past year?  1 to 2  3) How often did you have 6 or more drinks on one occasion in the past year? never    AUDIT-C Score: 1  Interpretation: Score 0-2 (female): Negative screen for alcohol misuse    Single Item Drug Screening:  How often have you used an illegal drug (including marijuana) or a prescription medication for non-medical reasons in the past year? never    Single Item Drug Screen Score: 0  Interpretation: Negative screen for possible drug use disorder      Duy Whitman MD

## 2021-08-11 NOTE — ASSESSMENT & PLAN NOTE
She had a partial thyroidectomy in June  She is due for thyroid function tests  She had a very large left-sided nodule which was negative for cancer

## 2021-08-11 NOTE — ASSESSMENT & PLAN NOTE
Good control with met and diet compliance       Lab Results   Component Value Date    HGBA1C 6 6 (A) 08/11/2021

## 2021-08-12 NOTE — PROGRESS NOTES
Assessment/Plan:    Essential hypertension  Patient takes Accupril, hydrochlorothiazide and metoprolol  Blood pressure is well controlled  She takes Accupril and HCTZ  Continue meds, low-salt diet  Continue follow-up here and with Cardiology  Type 2 diabetes mellitus without complication, without long-term current use of insulin ([facility])  Good control with met and diet compliance  Lab Results   Component Value Date    HGBA1C 6 6 (A) 08/11/2021       Stage 3 chronic kidney disease ([facility] )  Could be age related    GFR is stable in mid 46s    Continue good control DM, HTN, avoid NSAIDs, stay well hydrated  Paroxysmal SVT (supraventricular tachycardia) ([facility] )  Had ablation and no longer on beta blockers    S/P partial thyroidectomy  She had a partial thyroidectomy in June  She is due for thyroid function tests  She had a very large left-sided nodule which was negative for cancer  Problem List Items Addressed This Visit        Endocrine    Type 2 diabetes mellitus without complication, without long-term current use of insulin ([facility] ) - Primary     Good control with met and diet compliance  Lab Results   Component Value Date    HGBA1C 6 6 (A) 08/11/2021            Relevant Orders    POCT hemoglobin A1c (Completed)    Multiple thyroid nodules    Relevant Orders    TSH, 3rd generation with Free T4 reflex (Completed)       Cardiovascular and Mediastinum    Paroxysmal SVT (supraventricular tachycardia) ([facility] )     Had ablation and no longer on beta blockers         Essential hypertension     Patient takes Accupril, hydrochlorothiazide and metoprolol  Blood pressure is well controlled  She takes Accupril and HCTZ  Continue meds, low-salt diet  Continue follow-up here and with Cardiology  Genitourinary    Stage 3 chronic kidney disease ([facility] )     Could be age related    GFR is stable in mid 46s    Continue good control DM, HTN, avoid NSAIDs, stay well hydrated               Other    S/P partial thyroidectomy     She had a partial thyroidectomy in June  She is due for thyroid function tests  She had a very large left-sided nodule which was negative for cancer  Other Visit Diagnoses     Screening for malignant neoplasm of colon        Relevant Orders    Cologuard            Subjective:      Patient ID: Abena Talbot is a 80 y o  female  HPI    The following portions of the patient's history were reviewed and updated as appropriate: allergies, current medications, past family history, past medical history, past social history, past surgical history and problem list     Review of Systems   Constitutional: Negative for activity change, appetite change, fatigue and fever  HENT: Negative for congestion, ear pain, hearing loss, nosebleeds, postnasal drip, rhinorrhea and trouble swallowing  Eyes: Negative for photophobia, pain, redness and visual disturbance  Respiratory: Negative for cough, chest tightness, shortness of breath and wheezing  Cardiovascular: Negative for chest pain and palpitations  Gastrointestinal: Negative for abdominal pain, blood in stool, constipation, diarrhea, nausea and vomiting  Endocrine: Negative for cold intolerance, heat intolerance, polydipsia, polyphagia and polyuria  Genitourinary: Negative for difficulty urinating, dyspareunia, dysuria, flank pain, frequency, menstrual problem, pelvic pain, urgency, vaginal bleeding and vaginal discharge  Musculoskeletal: Negative for arthralgias, gait problem, joint swelling and myalgias  Skin: Negative for rash and wound  Neurological: Negative for dizziness, tremors, seizures, syncope, speech difficulty, weakness, light-headedness and headaches  Psychiatric/Behavioral: Negative for agitation, decreased concentration, dysphoric mood, sleep disturbance and suicidal ideas  The patient is not nervous/anxious            Objective:      /70   Temp 97 9 °F (36 6 °C)   Resp 18   Ht 5' 4 5" (1 638 m) Wt 104 kg (229 lb)   BMI 38 70 kg/m²          Physical Exam  Vitals and nursing note reviewed  Constitutional:       Appearance: Normal appearance  She is well-developed  She is obese  HENT:      Head: Normocephalic  Right Ear: Hearing, ear canal and external ear normal       Left Ear: Hearing, tympanic membrane, ear canal and external ear normal       Nose: Nose normal  No mucosal edema or rhinorrhea  Mouth/Throat:      Pharynx: Uvula midline  No oropharyngeal exudate  Eyes:      General: Lids are normal       Conjunctiva/sclera: Conjunctivae normal       Pupils: Pupils are equal, round, and reactive to light  Neck:      Thyroid: No thyroid mass or thyromegaly  Vascular: No carotid bruit  Cardiovascular:      Rate and Rhythm: Normal rate and regular rhythm  Pulses: Normal pulses  Heart sounds: Normal heart sounds, S1 normal and S2 normal  No murmur heard  No gallop  Pulmonary:      Effort: Pulmonary effort is normal       Breath sounds: Normal breath sounds  No wheezing or rales  Abdominal:      General: Bowel sounds are normal       Palpations: Abdomen is soft  Tenderness: There is no abdominal tenderness  Hernia: No hernia is present  Musculoskeletal:         General: Normal range of motion  Lymphadenopathy:      Cervical: No cervical adenopathy  Skin:     General: Skin is warm and dry  Findings: No rash  Neurological:      Mental Status: She is oriented to person, place, and time  Cranial Nerves: No cranial nerve deficit  Sensory: No sensory deficit  Coordination: Coordination normal    Psychiatric:         Speech: Speech normal          Behavior: Behavior normal  Behavior is cooperative  Thought Content:  Thought content normal          Judgment: Judgment normal

## 2021-09-02 DIAGNOSIS — I10 ESSENTIAL HYPERTENSION: ICD-10-CM

## 2021-09-03 RX ORDER — HYDROCHLOROTHIAZIDE 25 MG/1
TABLET ORAL
Qty: 90 TABLET | Refills: 1 | Status: SHIPPED | OUTPATIENT
Start: 2021-09-03 | End: 2022-03-08

## 2021-10-21 ENCOUNTER — OFFICE VISIT (OUTPATIENT)
Dept: DERMATOLOGY | Facility: CLINIC | Age: 81
End: 2021-10-21
Payer: MEDICARE

## 2021-10-21 DIAGNOSIS — L82.1 SEBORRHEIC KERATOSIS: Primary | ICD-10-CM

## 2021-10-21 DIAGNOSIS — Z85.828 HISTORY OF SKIN CANCER: ICD-10-CM

## 2021-10-21 DIAGNOSIS — Z13.89 SCREENING FOR SKIN CONDITION: ICD-10-CM

## 2021-10-21 PROCEDURE — 99213 OFFICE O/P EST LOW 20 MIN: CPT | Performed by: DERMATOLOGY

## 2021-10-27 DIAGNOSIS — E11.9 TYPE 2 DIABETES MELLITUS WITHOUT COMPLICATION, WITHOUT LONG-TERM CURRENT USE OF INSULIN (HCC): ICD-10-CM

## 2021-11-04 DIAGNOSIS — E78.2 MIXED HYPERLIPIDEMIA: ICD-10-CM

## 2021-11-04 RX ORDER — SIMVASTATIN 10 MG
TABLET ORAL
Qty: 90 TABLET | Refills: 1 | Status: SHIPPED | OUTPATIENT
Start: 2021-11-04 | End: 2022-04-26

## 2021-12-06 DIAGNOSIS — I10 HYPERTENSION, UNSPECIFIED TYPE: ICD-10-CM

## 2021-12-08 RX ORDER — QUINAPRIL 20 MG/1
TABLET ORAL
Qty: 90 TABLET | Refills: 1 | Status: SHIPPED | OUTPATIENT
Start: 2021-12-08 | End: 2022-04-26

## 2022-01-11 ENCOUNTER — TELEPHONE (OUTPATIENT)
Dept: FAMILY MEDICINE CLINIC | Facility: MEDICAL CENTER | Age: 82
End: 2022-01-11

## 2022-01-11 DIAGNOSIS — Z20.822 EXPOSURE TO COVID-19 VIRUS: Primary | ICD-10-CM

## 2022-01-11 PROCEDURE — U0005 INFEC AGEN DETEC AMPLI PROBE: HCPCS | Performed by: FAMILY MEDICINE

## 2022-01-11 PROCEDURE — U0003 INFECTIOUS AGENT DETECTION BY NUCLEIC ACID (DNA OR RNA); SEVERE ACUTE RESPIRATORY SYNDROME CORONAVIRUS 2 (SARS-COV-2) (CORONAVIRUS DISEASE [COVID-19]), AMPLIFIED PROBE TECHNIQUE, MAKING USE OF HIGH THROUGHPUT TECHNOLOGIES AS DESCRIBED BY CMS-2020-01-R: HCPCS | Performed by: FAMILY MEDICINE

## 2022-01-11 NOTE — TELEPHONE ENCOUNTER
Pt called requesting appt with Dr Svetlana Garcia for congestion and cough for 18 days  She is vaccinated, but didn't think she needed a covid test due to no fever or any other sx  Please triage

## 2022-01-11 NOTE — TELEPHONE ENCOUNTER
Covid order placed  Patient aware of testing protocols and current recommendations for quarantine  Wants to come in to see Dr Rubi Geiger if it is negative

## 2022-01-12 ENCOUNTER — OFFICE VISIT (OUTPATIENT)
Dept: FAMILY MEDICINE CLINIC | Facility: MEDICAL CENTER | Age: 82
End: 2022-01-12
Payer: MEDICARE

## 2022-01-12 VITALS
TEMPERATURE: 97.4 F | HEIGHT: 65 IN | SYSTOLIC BLOOD PRESSURE: 122 MMHG | DIASTOLIC BLOOD PRESSURE: 84 MMHG | OXYGEN SATURATION: 100 % | HEART RATE: 66 BPM | WEIGHT: 234 LBS | BODY MASS INDEX: 38.99 KG/M2

## 2022-01-12 DIAGNOSIS — J32.9 RHINOSINUSITIS: Primary | ICD-10-CM

## 2022-01-12 DIAGNOSIS — J31.0 RHINOSINUSITIS: Primary | ICD-10-CM

## 2022-01-12 PROCEDURE — 99213 OFFICE O/P EST LOW 20 MIN: CPT | Performed by: FAMILY MEDICINE

## 2022-01-12 RX ORDER — AMOXICILLIN 500 MG/1
500 CAPSULE ORAL EVERY 8 HOURS SCHEDULED
Qty: 30 CAPSULE | Refills: 0 | Status: SHIPPED | OUTPATIENT
Start: 2022-01-12 | End: 2022-01-22

## 2022-01-12 RX ORDER — GUAIFENESIN AND CODEINE PHOSPHATE 100; 10 MG/5ML; MG/5ML
5-10 SOLUTION ORAL 4 TIMES DAILY PRN
Qty: 120 ML | Refills: 0 | Status: SHIPPED | OUTPATIENT
Start: 2022-01-12 | End: 2022-08-04

## 2022-01-13 DIAGNOSIS — J32.9 RHINOSINUSITIS: ICD-10-CM

## 2022-01-13 DIAGNOSIS — J31.0 RHINOSINUSITIS: ICD-10-CM

## 2022-01-13 NOTE — PROGRESS NOTES
She has had a cough and congestion since Christmas  Her cough keeps her up at night  She has some postnasal drip, but no shortness of breath, sore throat, etc  She had a COVID test yesterday that was negative  Review of systems is otherwise negative     /84 (BP Location: Left arm, Patient Position: Sitting, Cuff Size: Large)   Pulse 66   Temp (!) 97 4 °F (36 3 °C)   Ht 5' 4 5" (1 638 m)   Wt 106 kg (234 lb)   SpO2 100%   BMI 39 55 kg/m²     Physical Exam  Constitutional:       Appearance: She is well-developed  HENT:      Head: Normocephalic  Right Ear: Hearing, tympanic membrane, ear canal and external ear normal       Left Ear: Hearing, tympanic membrane, ear canal and external ear normal       Nose: Mucosal edema and rhinorrhea present  Mouth/Throat:      Pharynx: Posterior oropharyngeal erythema present  No oropharyngeal exudate  Eyes:      Conjunctiva/sclera: Conjunctivae normal       Pupils: Pupils are equal, round, and reactive to light  Cardiovascular:      Rate and Rhythm: Normal rate and regular rhythm  Pulses: Normal pulses  Heart sounds: Normal heart sounds  No murmur heard  Pulmonary:      Effort: No tachypnea or respiratory distress  Breath sounds: Normal breath sounds  No wheezing  Musculoskeletal:      Cervical back: Normal range of motion  Rhinosinusitis    Will prescribe some amoxicillin, and codeine cough syrup at HS for his cough  Call if she is not improving

## 2022-01-14 ENCOUNTER — IMMUNIZATIONS (OUTPATIENT)
Dept: FAMILY MEDICINE CLINIC | Facility: HOSPITAL | Age: 82
End: 2022-01-14

## 2022-01-14 DIAGNOSIS — Z23 ENCOUNTER FOR IMMUNIZATION: Primary | ICD-10-CM

## 2022-01-14 PROCEDURE — 91300 COVID-19 PFIZER VACC 0.3 ML: CPT

## 2022-01-14 PROCEDURE — 0001A COVID-19 PFIZER VACC 0.3 ML: CPT

## 2022-01-19 DIAGNOSIS — E03.9 HYPOTHYROIDISM, UNSPECIFIED TYPE: ICD-10-CM

## 2022-01-19 RX ORDER — LEVOTHYROXINE SODIUM 0.05 MG/1
TABLET ORAL
Qty: 90 TABLET | Refills: 1 | Status: SHIPPED | OUTPATIENT
Start: 2022-01-19 | End: 2022-04-26

## 2022-01-27 DIAGNOSIS — E11.9 TYPE 2 DIABETES MELLITUS WITHOUT COMPLICATION, WITHOUT LONG-TERM CURRENT USE OF INSULIN (HCC): ICD-10-CM

## 2022-02-09 ENCOUNTER — RA CDI HCC (OUTPATIENT)
Dept: OTHER | Facility: HOSPITAL | Age: 82
End: 2022-02-09

## 2022-02-09 NOTE — PROGRESS NOTES
[facility]  coding opportunities        E11 36 and E11 22     Chart Reviewed * (Number of) Inbasket suggestions sent to Provider: 2                  Patients insurance company: [employer]

## 2022-02-15 ENCOUNTER — OFFICE VISIT (OUTPATIENT)
Dept: FAMILY MEDICINE CLINIC | Facility: MEDICAL CENTER | Age: 82
End: 2022-02-15
Payer: MEDICARE

## 2022-02-15 VITALS
BODY MASS INDEX: 37.12 KG/M2 | HEART RATE: 74 BPM | WEIGHT: 222.8 LBS | DIASTOLIC BLOOD PRESSURE: 68 MMHG | OXYGEN SATURATION: 96 % | HEIGHT: 65 IN | SYSTOLIC BLOOD PRESSURE: 150 MMHG | TEMPERATURE: 97.4 F

## 2022-02-15 DIAGNOSIS — Z13.0 SCREENING FOR IRON DEFICIENCY ANEMIA: ICD-10-CM

## 2022-02-15 DIAGNOSIS — I47.1 PAROXYSMAL SVT (SUPRAVENTRICULAR TACHYCARDIA) (HCC): ICD-10-CM

## 2022-02-15 DIAGNOSIS — E78.5 DYSLIPIDEMIA: ICD-10-CM

## 2022-02-15 DIAGNOSIS — N18.31 STAGE 3A CHRONIC KIDNEY DISEASE (HCC): ICD-10-CM

## 2022-02-15 DIAGNOSIS — E03.9 ACQUIRED HYPOTHYROIDISM: ICD-10-CM

## 2022-02-15 DIAGNOSIS — E11.9 TYPE 2 DIABETES MELLITUS WITHOUT COMPLICATION, WITHOUT LONG-TERM CURRENT USE OF INSULIN (HCC): Primary | ICD-10-CM

## 2022-02-15 DIAGNOSIS — I10 ESSENTIAL HYPERTENSION: ICD-10-CM

## 2022-02-15 LAB — SL AMB POCT HEMOGLOBIN AIC: 6.7 (ref ?–6.5)

## 2022-02-15 PROCEDURE — 99214 OFFICE O/P EST MOD 30 MIN: CPT | Performed by: FAMILY MEDICINE

## 2022-02-15 PROCEDURE — 83036 HEMOGLOBIN GLYCOSYLATED A1C: CPT | Performed by: FAMILY MEDICINE

## 2022-02-15 NOTE — ASSESSMENT & PLAN NOTE
Good control with met and diet compliance       Lab Results   Component Value Date    HGBA1C 6 7 (A) 02/15/2022

## 2022-02-15 NOTE — PROGRESS NOTES
Assessment/Plan:    Type 2 diabetes mellitus without complication, without long-term current use of insulin ([facility])  Good control with met and diet compliance  Lab Results   Component Value Date    HGBA1C 6 7 (A) 02/15/2022       Acquired hypothyroidism  Patient's thyroid is well controlled  Her TSH is at goal   She takes levothyroxine 50 mcg  Continue levothyroxine, continue periodically TSH  Recheck at next visit  Paroxysmal SVT (supraventricular tachycardia) ([facility] )  She had an ablation and she is now not taking any beta-blockers  She feels much better  Essential hypertension  Patient takes Accupril, hydrochlorothiazide  Blood pressure is well controlled  She takes Accupril and HCTZ  Continue meds, low-salt diet  Continue follow-up here and with Cardiology  Stage 3 chronic kidney disease ([facility] )  Could be age related    GFR is stable in mid 50s and low 60s  Continue good control DM, HTN, avoid NSAIDs, stay well hydrated  Dyslipidemia  Last LDL was 79  She is taking simvastatin 10 mg  Continue simvastatin  Continue low-fat diet  She is due for another lipid profile  Problem List Items Addressed This Visit        Endocrine    Type 2 diabetes mellitus without complication, without long-term current use of insulin ([facility] ) - Primary     Good control with met and diet compliance  Lab Results   Component Value Date    HGBA1C 6 7 (A) 02/15/2022            Relevant Orders    POCT hemoglobin A1c (Completed)    UA (URINE) with reflex to Scope    Microalbumin / creatinine urine ratio    Acquired hypothyroidism     Patient's thyroid is well controlled  Her TSH is at goal   She takes levothyroxine 50 mcg  Continue levothyroxine, continue periodically TSH  Recheck at next visit           Relevant Orders    TSH, 3rd generation with Free T4 reflex       Cardiovascular and Mediastinum    Paroxysmal SVT (supraventricular tachycardia) ([facility] )     She had an ablation and she is now not taking any beta-blockers  She feels much better  Essential hypertension     Patient takes Accupril, hydrochlorothiazide  Blood pressure is well controlled  She takes Accupril and HCTZ  Continue meds, low-salt diet  Continue follow-up here and with Cardiology  Relevant Orders    Comprehensive metabolic panel       Genitourinary    Stage 3 chronic kidney disease ([facility] )     Could be age related    GFR is stable in mid 50s and low 60s  Continue good control DM, HTN, avoid NSAIDs, stay well hydrated  Relevant Orders    Comprehensive metabolic panel       Other    Dyslipidemia     Last LDL was 79  She is taking simvastatin 10 mg  Continue simvastatin  Continue low-fat diet  She is due for another lipid profile  Relevant Orders    Lipid Panel with Direct LDL reflex      Other Visit Diagnoses     Screening for iron deficiency anemia        Relevant Orders    CBC and differential            Subjective:      Patient ID: April Reyes Buhl is a 80 y o  female  The following portions of the patient's history were reviewed and updated as appropriate: allergies, current medications, past family history, past medical history, past social history, past surgical history and problem list     Review of Systems   Constitutional: Negative  HENT: Negative  Eyes: Negative  Respiratory: Negative  Cardiovascular: Negative  Gastrointestinal: Negative  Genitourinary: Negative  Musculoskeletal: Negative  Neurological: Negative  Objective:      /68 (BP Location: Left arm, Patient Position: Sitting, Cuff Size: Large)   Pulse 74   Temp (!) 97 4 °F (36 3 °C)   Ht 5' 4 5" (1 638 m)   Wt 101 kg (222 lb 12 8 oz)   SpO2 96%   BMI 37 65 kg/m²          Physical Exam  Vitals and nursing note reviewed  Constitutional:       Appearance: Normal appearance  She is well-developed  HENT:      Head: Normocephalic        Right Ear: Hearing, ear canal and external ear normal       Left Ear: Hearing, tympanic membrane, ear canal and external ear normal       Nose: Nose normal  No mucosal edema or rhinorrhea  Mouth/Throat:      Pharynx: Uvula midline  No oropharyngeal exudate  Eyes:      General: Lids are normal       Conjunctiva/sclera: Conjunctivae normal       Pupils: Pupils are equal, round, and reactive to light  Neck:      Thyroid: No thyroid mass or thyromegaly  Vascular: No carotid bruit  Cardiovascular:      Rate and Rhythm: Normal rate and regular rhythm  Pulses: Normal pulses  Heart sounds: Normal heart sounds, S1 normal and S2 normal  No murmur heard  No gallop  Pulmonary:      Effort: Pulmonary effort is normal       Breath sounds: Normal breath sounds  No wheezing or rales  Abdominal:      General: Bowel sounds are normal       Palpations: Abdomen is soft  Tenderness: There is no abdominal tenderness  Hernia: No hernia is present  Musculoskeletal:         General: Normal range of motion  Lymphadenopathy:      Cervical: No cervical adenopathy  Skin:     General: Skin is warm and dry  Findings: No rash  Neurological:      Mental Status: She is oriented to person, place, and time  Cranial Nerves: No cranial nerve deficit  Sensory: No sensory deficit  Coordination: Coordination normal    Psychiatric:         Speech: Speech normal          Behavior: Behavior normal  Behavior is cooperative  Thought Content:  Thought content normal          Judgment: Judgment normal

## 2022-02-15 NOTE — ASSESSMENT & PLAN NOTE
Last LDL was 79  She is taking simvastatin 10 mg  Continue simvastatin  Continue low-fat diet  She is due for another lipid profile

## 2022-02-15 NOTE — ASSESSMENT & PLAN NOTE
Patient takes Accupril, hydrochlorothiazide  Blood pressure is well controlled  She takes Accupril and HCTZ  Continue meds, low-salt diet  Continue follow-up here and with Cardiology

## 2022-02-15 NOTE — ASSESSMENT & PLAN NOTE
Could be age related    GFR is stable in mid 50s and low 60s  Continue good control DM, HTN, avoid NSAIDs, stay well hydrated

## 2022-03-07 DIAGNOSIS — I10 ESSENTIAL HYPERTENSION: ICD-10-CM

## 2022-03-08 RX ORDER — HYDROCHLOROTHIAZIDE 25 MG/1
TABLET ORAL
Qty: 90 TABLET | Refills: 1 | Status: SHIPPED | OUTPATIENT
Start: 2022-03-08

## 2022-03-31 ENCOUNTER — TELEPHONE (OUTPATIENT)
Dept: SURGICAL ONCOLOGY | Facility: CLINIC | Age: 82
End: 2022-03-31

## 2022-04-04 ENCOUNTER — TELEPHONE (OUTPATIENT)
Dept: HEMATOLOGY ONCOLOGY | Facility: CLINIC | Age: 82
End: 2022-04-04

## 2022-04-04 NOTE — TELEPHONE ENCOUNTER
Reschedule Appointment     Who is calling in Patient   Doctor Appointment Scheduled with Christina   Original date and time 6-23-22 @ 10:30am   New date and time 6-23-22 @ 11:00am    Location [facility]    Patient verbalized understanding

## 2022-04-06 NOTE — PATIENT INSTRUCTIONS
Pharmacist called from [place] [employer]. She states that she researched a head to head study of Januvia. She said that Glipizide is equally has efficacious as Januvia. Wonders if OK to switch Januvia to Glipizide. She states that Arnulfo Valenzuela would not be cost effective. Skin lesion await results of biopsy before proceeding further therapy may require Mohs  Seborrheic Keratosis  Patient reasurred these are normal growths we acquire with age no treatment needed    Screening for Dermatologic Disorders: Nothing else of concern noted on complete exam follow up in 1 year   Wound care instructions given to patient

## 2022-04-26 DIAGNOSIS — E03.9 HYPOTHYROIDISM, UNSPECIFIED TYPE: ICD-10-CM

## 2022-04-26 RX ORDER — LEVOTHYROXINE SODIUM 0.05 MG/1
TABLET ORAL
Qty: 90 TABLET | Refills: 1 | Status: SHIPPED | OUTPATIENT
Start: 2022-04-26

## 2022-06-14 ENCOUNTER — OFFICE VISIT (OUTPATIENT)
Dept: CARDIOLOGY CLINIC | Facility: MEDICAL CENTER | Age: 82
End: 2022-06-14
Payer: MEDICARE

## 2022-06-14 VITALS
DIASTOLIC BLOOD PRESSURE: 70 MMHG | OXYGEN SATURATION: 96 % | HEIGHT: 65 IN | SYSTOLIC BLOOD PRESSURE: 140 MMHG | BODY MASS INDEX: 37.49 KG/M2 | HEART RATE: 89 BPM | WEIGHT: 225 LBS

## 2022-06-14 DIAGNOSIS — I44.7 LBBB (LEFT BUNDLE BRANCH BLOCK): ICD-10-CM

## 2022-06-14 DIAGNOSIS — E78.5 DYSLIPIDEMIA: ICD-10-CM

## 2022-06-14 DIAGNOSIS — I47.1 PAROXYSMAL SVT (SUPRAVENTRICULAR TACHYCARDIA) (HCC): Primary | ICD-10-CM

## 2022-06-14 DIAGNOSIS — I10 ESSENTIAL HYPERTENSION: ICD-10-CM

## 2022-06-14 PROCEDURE — 99214 OFFICE O/P EST MOD 30 MIN: CPT | Performed by: INTERNAL MEDICINE

## 2022-06-14 RX ORDER — METOPROLOL SUCCINATE 25 MG/1
25 TABLET, EXTENDED RELEASE ORAL DAILY
Qty: 90 TABLET | Refills: 3 | Status: SHIPPED | OUTPATIENT
Start: 2022-06-14

## 2022-06-14 NOTE — PROGRESS NOTES
Cardiology   Abena Savage 82 y o  female MRN: [id number]        Impression:  1  Paroxysmal SVT - s/p ablation   Occasional palpitations  2  Hypertension - elevated  3  Dyslipidemia - on statin  4  Chronic LBBB     Recommendations:  1  Start metoprolol succinate 25mg daily  2  Continue remainder of medications  3  Follow up in 1 year  HPI: Abena Gonsalves is a [de-identified]y o  year old female with paroxysmal SVT s/p ablation , dyslipidemia, LBBB, and hypertension, who returns for follow up after almost 2 years  Has palpitation lasting several seconds once every several weeks  Review of Systems   Constitutional: Negative  HENT: Negative  Eyes: Negative  Respiratory: Negative for chest tightness and shortness of breath  Cardiovascular: Positive for palpitations  Negative for chest pain and leg swelling  Gastrointestinal: Negative  Endocrine: Negative  Genitourinary: Negative  Musculoskeletal: Negative  Skin: Negative  Allergic/Immunologic: Negative  Neurological: Negative  Hematological: Negative  Psychiatric/Behavioral: Negative  All other systems reviewed and are negative          Past Medical History:   Diagnosis Date    Chronic kidney disease     Stage III    Diabetes mellitus ([facility] )     Disease of thyroid gland     Hyperlipidemia     Hypertension     Irregular heart beat     LBBB (left bundle branch block)     Palpitations     SVT (supraventricular tachycardia) ([facility] ) 2019    Resolved Post Ablation     Past Surgical History:   Procedure Laterality Date    APPENDECTOMY      CARDIAC ELECTROPHYSIOLOGY MAPPING AND ABLATION  2019    CARPAL TUNNEL RELEASE Right      SECTION      HERNIA REPAIR      JOINT REPLACEMENT Right     Knee    JOINT REPLACEMENT Right     Hip    KNEE ARTHROSCOPY Right     therapeutic     KNEE SURGERY Left     Cartilage repair    THYROID LOBECTOMY Left 2021    Procedure: HEMITHYROIDECTOMY;  Surgeon: Felipa Ozuna Cheyenne Lovett MD;  [facility];  Service: Surgical Oncology    TONSILLECTOMY      US GUIDED THYROID BIOPSY  2021     Social History     Substance and Sexual Activity   Alcohol Use Not Currently    Alcohol/week: 0 0 standard drinks     Social History     Substance and Sexual Activity   Drug Use Never     Social History     Tobacco Use   Smoking Status Former Smoker    Packs/day: 0 00    Years: 0 00    Pack years: 0 00    Quit date: 12    Years since quittin 4   Smokeless Tobacco Never Used     Family History   Problem Relation Age of Onset    Arthritis Mother     Other Mother         CABG    Cancer Mother     Heart disease Mother     Osteoporosis Mother     Lung cancer Father     Pancreatic cancer Father        Allergies:  No Known Allergies    Medications:     Current Outpatient Medications:     Aspirin 81 MG EC tablet, Take 1 tablet by mouth daily, Disp: , Rfl:     hydrochlorothiazide (HYDRODIURIL) 25 mg tablet, TAKE 1 TABLET BY MOUTH EVERY DAY, Disp: 90 tablet, Rfl: 1    levothyroxine 50 mcg tablet, TAKE 1 TABLET BY MOUTH EVERY DAY, Disp: 90 tablet, Rfl: 1    magnesium Oxide (MAG-OX) 400 mg TABS, Take 1 tablet by mouth daily, Disp: , Rfl:     metFORMIN (GLUCOPHAGE) 850 mg tablet, TAKE 1 TABLET BY MOUTH TWICE A DAY WITH BREAKFAST AND DINNER, Disp: 180 tablet, Rfl: 1    Multiple Vitamins-Minerals (CENTRUM SILVER 50+WOMEN PO), Take 1 tablet by mouth daily , Disp: , Rfl:     Potassium 99 MG TABS, Take 1 tablet by mouth daily, Disp: , Rfl:     quinapril (ACCUPRIL) 20 mg tablet, TAKE 1 TABLET BY MOUTH EVERY DAY, Disp: 90 tablet, Rfl: 1    simvastatin (ZOCOR) 10 mg tablet, TAKE 1 TABLET BY MOUTH EVERY DAY, Disp: 90 tablet, Rfl: 1    guaifenesin-codeine (GUAIFENESIN AC) 100-10 MG/5ML liquid, Take 5-10 mL by mouth 4 (four) times a day as needed for cough (Patient not taking: Reported on 2022), Disp: 120 mL, Rfl: 0      Wt Readings from Last 3 Encounters:   22 102 kg (225 lb)   02/15/22 101 kg (222 lb 12 8 oz)   01/12/22 106 kg (234 lb)     Temp Readings from Last 3 Encounters:   02/15/22 (!) 97 4 °F (36 3 °C)   01/12/22 (!) 97 4 °F (36 3 °C)   08/11/21 97 9 °F (36 6 °C)     BP Readings from Last 3 Encounters:   06/14/22 140/70   02/15/22 150/68   01/12/22 122/84     Pulse Readings from Last 3 Encounters:   06/14/22 89   02/15/22 74   01/12/22 66         Physical Exam  HENT:      Head: Atraumatic  Mouth/Throat:      Mouth: Mucous membranes are moist    Eyes:      Extraocular Movements: Extraocular movements intact  Cardiovascular:      Rate and Rhythm: Normal rate and regular rhythm  Heart sounds: Normal heart sounds  Pulmonary:      Effort: Pulmonary effort is normal       Breath sounds: Normal breath sounds  Abdominal:      General: Abdomen is flat  Musculoskeletal:         General: Normal range of motion  Cervical back: Normal range of motion  Skin:     General: Skin is warm  Neurological:      General: No focal deficit present  Mental Status: She is alert and oriented to person, place, and time     Psychiatric:         Mood and Affect: Mood normal          Behavior: Behavior normal            Laboratory Studies:  CMP:  Lab Results   Component Value Date     01/10/2018    K 3 7 08/11/2021     08/11/2021    CO2 29 08/11/2021    ANIONGAP 8 10/14/2015    BUN 19 08/11/2021    CREATININE 0 90 08/11/2021    GLUCOSE 172 (H) 10/14/2015    AST 22 08/11/2021    ALT 22 08/11/2021    BILITOT 0 54 10/14/2015    EGFR 61 08/11/2021       Lipid Profile:   Lab Results   Component Value Date    CHOL 153 07/10/2017     Lab Results   Component Value Date    HDL 62 07/07/2020     Lab Results   Component Value Date    LDLCALC 79 07/07/2020     Lab Results   Component Value Date    TRIG 118 07/07/2020       Cardiac testing:   EKG reviewed personally:   Results for orders placed during the hospital encounter of 10/24/19    Echo complete with contrast if indicated    Narrative  [facility] [address] [facility]  [phone]    Transthoracic Echocardiogram  Limited 2D, M-mode, Doppler, and Color Doppler    Study date:  24-Oct-2019    Patient: Mirella Tracy  MR number: [id number]  Account number: [de-identified]  : 1940  Age: 70 years  Gender: Female  Status: Outpatient  Location: [facility]  Height: 64 in  Weight: 237 lb  BP: 122/ 78 mmHg    Indications: Murmur  Diagnoses: R01 1 - Cardiac murmur, unspecified    Sonographer:  FRANCA Dennis  Primary Physician:  Socorro Mckeon MD  Referring Physician:  Ray Muniz MD  Group:  [facility]  Interpreting Physician:  Jessica Chavez DO    IMPRESSIONS:  Normal left ventricular size and systolic function, ejection fraction 55%  Abnormal septal motion consistent with left bundle branch block  Grade 1 diastolic dysfunction  Normal right ventricular size and systolic function  Normal aortic root  Moderate left atrial dilation  Normal right atrium  Mildly calcified and sclerotic aortic valve with mild insufficiency and no stenosis  No other significant valve abnormalities  Normal IVC size and inspiratory collapse  RA pressure estimated at 3 mmHg  Upper normal pulmonary pressure  PASP estimated at 36 mmHg  Compared to prior echocardiogram, the aortic valve is now mildly calcified and sclerotic with mild aortic insufficiency  SUMMARY    LEFT VENTRICLE:  Systolic function was normal by visual assessment  Ejection fraction was estimated to be 55 %  There were no regional wall motion abnormalities  Doppler parameters were consistent with abnormal left ventricular relaxation (grade 1 diastolic dysfunction)  VENTRICULAR SEPTUM:  There was mild paradoxical motion  These changes are consistent with LBBB  RIGHT VENTRICLE:  Systolic pressure was at the upper limits of normal  Estimated peak pressure was 36 mmHg      LEFT ATRIUM:  The atrium was moderately dilated  AORTIC VALVE:  Leaflets exhibited moderate calcification, lower normal cuspal separation, and sclerosis  There was mild regurgitation  Regurgitation grade was 1+ on a scale of 0 to 4+  COMPARISONS:  Compared to prior echocardiogram, the aortic valve is now mildly calcified and sclerotic with mild aortic insufficiency  HISTORY: PRIOR HISTORY: SVT  LBBB  Risk factors: diabetes, medication-treated hypercholesterolemia, and morbid obesity  PRIOR PROCEDURES: Arrhythmia ablation  PROCEDURE: The study was performed in the [address] [place] [address]  This was a routine study  The transthoracic approach was used  The study included limited 2D imaging, M-mode, complete spectral Doppler, and color Doppler  The  heart rate was 78 bpm, at the start of the study  Images were obtained from the parasternal, apical, subcostal, and suprasternal notch acoustic windows  Intravenous contrast ( 1 2 Definity in NSS  , 6 ml) was administered to opacify the  left ventricle  This was a technically difficult study  LEFT VENTRICLE: Size was normal  Systolic function was normal by visual assessment  Ejection fraction was estimated to be 55 %  There were no regional wall motion abnormalities  Wall thickness was normal  DOPPLER: Doppler parameters were  consistent with abnormal left ventricular relaxation (grade 1 diastolic dysfunction)  VENTRICULAR SEPTUM: There was mild paradoxical motion  These changes are consistent with LBBB  RIGHT VENTRICLE: The size was normal  Systolic function was normal  Wall thickness was normal  DOPPLER: Systolic pressure was at the upper limits of normal  Estimated peak pressure was 36 mmHg  LEFT ATRIUM: The atrium was moderately dilated  RIGHT ATRIUM: Size was normal     MITRAL VALVE: Valve structure was normal  There was normal leaflet separation  DOPPLER: The transmitral velocity was within the normal range   There was no evidence for stenosis  There was no regurgitation  AORTIC VALVE: Leaflets exhibited moderate calcification, lower normal cuspal separation, and sclerosis  DOPPLER: Transaortic velocity was minimally increased  There was no evidence for stenosis  There was mild regurgitation  Regurgitation  grade was 1+ on a scale of 0 to 4+  TRICUSPID VALVE: The valve structure was normal  There was normal leaflet separation  DOPPLER: The transtricuspid velocity was within the normal range  There was no evidence for stenosis  There was no regurgitation  PULMONIC VALVE: Leaflets exhibited normal thickness, no calcification, and normal cuspal separation  DOPPLER: The transpulmonic velocity was within the normal range  There was no regurgitation  PERICARDIUM: There was no pericardial effusion  The pericardium was normal in appearance  AORTA: The root exhibited normal size  SYSTEMIC VEINS: IVC: The inferior vena cava was normal in size and course  Respirophasic changes were normal  RA pressure estimated at 3 mmHg  SYSTEM MEASUREMENT TABLES    2D  %FS: 24 97 %  Ao Diam: 3 02 cm  EDV(Teich): 72 41 ml  EF(Teich): 49 89 %  ESV(Teich): 36 28 ml  IVSd: 1 01 cm  LA Diam: 3 31 cm  LVIDd: 4 06 cm  LVIDs: 3 04 cm  LVOT Diam: 1 83 cm  LVPWd: 0 99 cm  RWT: 0 49  SV(Teich): 36 13 ml    2D mode  LAAs: 20 5 cm2  RAAs: 11 4 cm2    CW  AR Dec [place]: 2 55 m/s2  AR Dec Time: 1422 06 ms  AR PHT: 412 4 ms  AR Vmax: 3 63 m/s  AR maxP 69 mmHg  AV Env  Ti: 342 53 ms  AV MaxP 45 mmHg  AV VTI: 44 79 cm  AV Vmax: 1 83 m/s  AV Vmean: 1 31 m/s  AV meanP 52 mmHg  TR MaxP 55 mmHg  TR Vmax: 2 85 m/s    MM  TAPSE: 2 45 cm    PW  DEMETRIA (VTI): 1 8 cm2  DEMETRIA Vmax: 1 89 cm2  E': 0 06 m/s  E/E': 12 59  LVOT Env  Ti: 338 73 ms  LVOT VTI: 30 58 cm  LVOT Vmax: 1 31 m/s  LVOT Vmean: 0 9 m/s  LVOT maxP 89 mmHg  LVOT meanPG: 3 84 mmHg  LVSV Dopp: 80 72 ml  MV A Jude: 1 12 m/s  MV Dec [place]: 4 68 m/s2  MV DecT: 170 34 ms  MV E Jude: 0 8 m/s  MV E/A Ratio: 0 71  MV PHT: 49 4 ms  MVA By PHT: 4 45 cm2    [employer] Accredited Echocardiography Laboratory    Prepared and electronically signed by    Idania Lerma DO  Signed 24-Oct-2019 14:28:31    No results found for this or any previous visit  No results found for this or any previous visit  No results found for this or any previous visit

## 2022-06-14 NOTE — PATIENT INSTRUCTIONS
Recommendations:  1  Start metoprolol succinate 25mg daily  2  Continue remainder of medications  3  Follow up in 1 year

## 2022-06-20 ENCOUNTER — HOSPITAL ENCOUNTER (OUTPATIENT)
Dept: ULTRASOUND IMAGING | Facility: CLINIC | Age: 82
Discharge: HOME | End: 2022-06-20
Payer: MEDICARE

## 2022-06-20 DIAGNOSIS — E04.2 MULTIPLE THYROID NODULES: ICD-10-CM

## 2022-06-20 PROCEDURE — 76536 US EXAM OF HEAD AND NECK: CPT

## 2022-06-23 ENCOUNTER — OFFICE VISIT (OUTPATIENT)
Dept: SURGICAL ONCOLOGY | Facility: CLINIC | Age: 82
End: 2022-06-23
Payer: MEDICARE

## 2022-06-23 VITALS
SYSTOLIC BLOOD PRESSURE: 138 MMHG | TEMPERATURE: 97.6 F | HEART RATE: 85 BPM | WEIGHT: 227 LBS | HEIGHT: 65 IN | DIASTOLIC BLOOD PRESSURE: 70 MMHG | BODY MASS INDEX: 37.82 KG/M2 | RESPIRATION RATE: 16 BRPM | OXYGEN SATURATION: 97 %

## 2022-06-23 DIAGNOSIS — E04.2 MULTIPLE THYROID NODULES: Primary | ICD-10-CM

## 2022-06-23 PROCEDURE — 99213 OFFICE O/P EST LOW 20 MIN: CPT

## 2022-06-23 NOTE — PROGRESS NOTES
Surgical Oncology Follow Up       [address] [place] [address],[address] [place] [address]  [facility] [place]  [address] [id number]    April Janette  1940  [id number]  [address] [place] [address],[address] [place] [address]  [facility] [place]  [address] [id number]    Diagnoses and all orders for this visit:    Multiple thyroid nodules  -     US thyroid; Future        Chief Complaint   Patient presents with   Tyrel Ayers in about 1 year (around 2023) for Office Visit, Imaging - See orders  Stagin 9cm left midgland and 2 8cm right midgland nodules biopsied 2021, both Lucinda II (benign)  Treatment history:  Left thyroid lobectomy, 2021  Current treatment:  Right lobe observation  Disease status: KERI    History of Present Illness: The patient returns today for surveillance for benign thyroid nodules  She is 1 year status-post left hemithyroidectomy for a nodule that was almost 5 cm in size, which was found to be benign on final pathology  She has several right-sided nodules that we continue to follow with ultrasound  The patient reports she is not having any symptoms, and she denies any difficulty swallowing, voice changes or pressure in her throat  She does report developing palpitations recently  Her cardiologist started her on a beta-blocker, and she has not had any episodes since  Thyroid function studies were performed last August, and these were all within normal range  Most recent ultrasound was performed on 2022  I have reviewed this and discussed it with the patient and her  today  Review of Systems   Constitutional: Negative for activity change, appetite change, fatigue and unexpected weight change  HENT: Negative for sore throat, trouble swallowing and voice change  Eyes: Negative  Respiratory: Negative for cough, choking, chest tightness and shortness of breath      Cardiovascular: Positive for palpitations  Gastrointestinal: Negative  Endocrine: Negative  Genitourinary: Negative  Musculoskeletal: Negative  Skin: Negative  Allergic/Immunologic: Negative  Neurological: Negative  Hematological: Negative  Psychiatric/Behavioral: Negative            Patient Active Problem List   Diagnosis    Paroxysmal SVT (supraventricular tachycardia) (HCC)    Essential hypertension    Dyslipidemia    LBBB (left bundle branch block)    Type 2 diabetes mellitus without complication, without long-term current use of insulin (HCC)    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)    Morbid obesity with BMI of 40 0-44 9, adult (HCC)    Acquired hypothyroidism    Stage 3 [facility] ([facility])    Multiple thyroid nodules    S/P partial thyroidectomy     Past Medical History:   Diagnosis Date    Chronic kidney disease     Stage III    [facility] [address] [facility]     Disease of thyroid gland     Hyperlipidemia     Hypertension     Irregular heart beat     LBBB (left bundle branch block)     Palpitations     SVT (supraventricular tachycardia) ([facility] [address] [facility] ) 2019    Resolved Post Ablation     Past Surgical History:   Procedure Laterality Date    APPENDECTOMY      CARDIAC ELECTROPHYSIOLOGY MAPPING AND ABLATION  2019    CARPAL TUNNEL RELEASE Right      SECTION      HERNIA REPAIR      JOINT REPLACEMENT Right     Knee    JOINT REPLACEMENT Right     Hip    KNEE ARTHROSCOPY Right     therapeutic     KNEE SURGERY Left     Cartilage repair    THYROID LOBECTOMY Left 2021    Procedure: HEMITHYROIDECTOMY;  Surgeon: Waqar Wu MD;  [facility];  Service: Surgical Oncology    TONSILLECTOMY      US GUIDED THYROID BIOPSY  2021     Family History   Problem Relation Age of Onset    Arthritis Mother     Other Mother         CABG    Cancer Mother     Heart disease Mother     Osteoporosis Mother  Lung cancer Father     Pancreatic cancer Father      Social History     Socioeconomic History    Marital status: /Civil Union     Spouse name: Not on file    Number of children: Not on file    Years of education: Not on file    Highest education level: Not on file   Occupational History    Not on file   Tobacco Use    Smoking status: Former Smoker     Packs/day: 0 00     Years: 0 00     Pack years: 0 00     Quit date:      Years since quittin 11    Smokeless tobacco: Never Used   Vaping Use    Vaping Use: Never used   Substance and Sexual Activity    Alcohol use: Not Currently     Alcohol/week: 0 0 standard drinks    Drug use: Never    Sexual activity: Not on file   Other Topics Concern    Not on file   Social History Narrative    Caffeine use      Social Determinants of Health     Financial Resource Strain: Not on file   Food Insecurity: Not on file   Transportation Needs: Not on file   Physical Activity: Not on file   Stress: Not on file   Social Connections: Not on file   Intimate Partner Violence: Not on file   Housing Stability: Not on file       Current Outpatient Medications:     Aspirin 81 MG EC tablet, Take 1 tablet by mouth daily, Disp: , Rfl:     hydrochlorothiazide (HYDRODIURIL) 25 mg tablet, TAKE 1 TABLET BY MOUTH EVERY DAY, Disp: 90 tablet, Rfl: 1    levothyroxine 50 mcg tablet, TAKE 1 TABLET BY MOUTH EVERY DAY, Disp: 90 tablet, Rfl: 1    magnesium Oxide (MAG-OX) 400 mg TABS, Take 1 tablet by mouth daily, Disp: , Rfl:     metFORMIN (GLUCOPHAGE) 850 mg tablet, TAKE 1 TABLET BY MOUTH TWICE A DAY WITH BREAKFAST AND DINNER, Disp: 180 tablet, Rfl: 1    metoprolol succinate (TOPROL-XL) 25 mg 24 hr tablet, Take 1 tablet (25 mg total) by mouth daily, Disp: 90 tablet, Rfl: 3    Multiple Vitamins-Minerals (CENTRUM SILVER 50+WOMEN PO), Take 1 tablet by mouth daily , Disp: , Rfl:     Potassium 99 MG TABS, Take 1 tablet by mouth daily, Disp: , Rfl:     quinapril (ACCUPRIL) 20 mg tablet, TAKE 1 TABLET BY MOUTH EVERY DAY, Disp: 90 tablet, Rfl: 1    simvastatin (ZOCOR) 10 mg tablet, TAKE 1 TABLET BY MOUTH EVERY DAY, Disp: 90 tablet, Rfl: 1    guaifenesin-codeine (GUAIFENESIN AC) 100-10 MG/5ML liquid, Take 5-10 mL by mouth 4 (four) times a day as needed for cough (Patient not taking: No sig reported), Disp: 120 mL, Rfl: 0  No Known Allergies  Vitals:    06/23/22 1109   BP: 138/70   Pulse: 85   Resp: 16   Temp: 97 6 °F (36 4 °C)   SpO2: 97%       Physical Exam  Vitals reviewed  Constitutional:       General: She is not in acute distress  Appearance: Normal appearance  She is not ill-appearing or toxic-appearing  HENT:      Head: Normocephalic and atraumatic  Nose: Nose normal       Mouth/Throat:      Mouth: Mucous membranes are moist    Eyes:      General: No scleral icterus  Extraocular Movements: Extraocular movements intact  Conjunctiva/sclera: Conjunctivae normal       Pupils: Pupils are equal, round, and reactive to light  Neck:      Thyroid: Thyromegaly (right-sided thyroid prominence) present  Cardiovascular:      Rate and Rhythm: Normal rate  Pulmonary:      Effort: Pulmonary effort is normal    Musculoskeletal:         General: Normal range of motion  Cervical back: Normal range of motion and neck supple  No tenderness  Lymphadenopathy:      Cervical: No cervical adenopathy  Skin:     General: Skin is warm and dry  Neurological:      General: No focal deficit present  Mental Status: She is alert and oriented to person, place, and time  Psychiatric:         Mood and Affect: Mood normal          Behavior: Behavior normal          Thought Content: Thought content normal          Judgment: Judgment normal          Imaging  US thyroid    Result Date: 6/20/2022  Narrative: THYROID ULTRASOUND INDICATION:  E04 2: Nontoxic multinodular goiter  Patient underwent left thyroid lobectomy 6/9/2021    COMPARISON:  Thyroid ultrasound 3/15/2021, thyroid biopsy 4/22/2021 TECHNIQUE:   Ultrasound of the thyroid was performed with a high frequency linear transducer in transverse and sagittal planes including volumetric imaging sweeps as well as traditional still imaging technique  FINDINGS:  Thyroid parenchyma is diffusely heterogeneous in echotexture  Right lobe: 7 2 x 2 6 x 3 7 cm  Volume 32 5 mL Left lobe:  Surgically absent  Isthmus: 0 3  cm  Nodule #1  Image 16 RIGHT midgland nodule measuring 3 1 x 1 6 x 2 5 cm, previously 2 8 x 1 3 x 2 2 cm  COMPOSITION:  2 points, solid or almost completely solid  ECHOGENICITY:  1 point, hyperechoic or isoechoic  SHAPE:  0 points, wider-than-tall  MARGIN: 0 points, smooth  ECHOGENIC FOCI:  0 points, none or large comet-tail artifacts  TI-RADS Classification: TR 3 (3 points)  This nodule was biopsied in April 2021 with benign result  Nodule #2  Image 19 RIGHT lower pole posterior nodule measuring 1 8 x 1 5 x 1 5 cm, previously 1 4 x 1 7 x 1 4 cm  COMPOSITION:  2 points, solid or almost completely solid  ECHOGENICITY:  1 point, hyperechoic or isoechoic  SHAPE:  0 points, wider-than-tall  MARGIN: 0 points, ill-defined  ECHOGENIC FOCI:  0 points, none or large comet-tail artifacts  TI-RADS Classification: TR 3 (3 points), FNA if >2 5 cm  Follow if >1 5 cm  Nodule #3  Image 22 RIGHT lower pole anterior nodule measuring 2 2 x 1 3 x 2 0 cm, previously measured about 2 1 x 1 1 x 1 5 cm when measured in a similar fashion  COMPOSITION:  2 points, solid or almost completely solid  ECHOGENICITY:  1 point, hyperechoic or isoechoic  SHAPE:  0 points, wider-than-tall  MARGIN: 0 points, ill-defined  ECHOGENIC FOCI:  0 points, none or large comet-tail artifacts  TI-RADS Classification: TR 3 (3 points), FNA if >2 5 cm  Follow if >1 5 cm  There are additional right-sided nodules of lesser size and/or TI-RADS score    At the time of follow-up for the above described dominant nodules, these will be reevaluated in detail at that time if needed  The left thyroid bed is grossly unremarkable  Impression: Stable to minimally larger right thyroid nodules as above  Continued ultrasound follow-up in 12 months recommended  The study was marked in [facility] for significant notification and follow-up  Reference: ACR Thyroid Imaging, Reporting and Data System (TI-RADS): White Paper of the [employer]  J AM Sophia Radiol 7364;83:[id number]  (additional recommendations based on American Thyroid Association 2015 guidelines ) Workstation performed: [id number]     I reviewed the above imaging data  Discussion/Summary: This is an 56-year-old female who presents to the office today for continued surveillance of benign thyroid nodules  She is status post left hemithyroidectomy  While she is asymptomatic, her ultrasound does show interval enlargement of several right-sided nodules, the largest of which is now 3 1 cm in maximal dimension  Biopsy performed last year came back as [place] 2  I did explain that, as nodules enlarge, biopsies becomes less reliable  I have suggested we repeat ultrasound in 1 year, and she should see Dr Jonathan Kemp afterward in the event the nodules continue to enlarge  Regarding her recent palpitations, I am not sure if this is related to thyroid function  She denies any other symptoms of hyperthyroidism  I have offered to repeat her thyroid function studies now, but she declined  I have advised her to call the office if she develops any difficulty swallowing or changes in her voice, and we can order ultrasound sooner  Patient is agreeable to plan, all questions have been answered

## 2022-07-07 ENCOUNTER — OFFICE VISIT (OUTPATIENT)
Dept: DERMATOLOGY | Facility: CLINIC | Age: 82
End: 2022-07-07
Payer: MEDICARE

## 2022-07-07 VITALS — BODY MASS INDEX: 37.82 KG/M2 | WEIGHT: 227 LBS | HEIGHT: 65 IN

## 2022-07-07 DIAGNOSIS — Z13.89 SCREENING FOR SKIN CONDITION: ICD-10-CM

## 2022-07-07 DIAGNOSIS — Z85.828 HISTORY OF SKIN CANCER: ICD-10-CM

## 2022-07-07 DIAGNOSIS — L82.1 SEBORRHEIC KERATOSIS: Primary | ICD-10-CM

## 2022-07-07 PROCEDURE — 99213 OFFICE O/P EST LOW 20 MIN: CPT | Performed by: DERMATOLOGY

## 2022-07-07 NOTE — PROGRESS NOTES
[address]  [address] [facility] [address]  [phone]  [phone]     MRN: [id number] : 1940  Encounter: [id number]  Patient Information:   Chief complaint:  Six-month checkup    History of present illness:  47-year-old female presents for six-month checkup with previous history of nonmelanoma skin cancer last skin cancer was almost 2 years ago    No specific concerns noted  Past Medical History:   Diagnosis Date    Chronic kidney disease     Stage III    Diabetes mellitus ([facility] )     Disease of thyroid gland     Hyperlipidemia     Hypertension     Irregular heart beat     LBBB (left bundle branch block)     Palpitations     SVT (supraventricular tachycardia) ([facility] [facility] ) 2019    Resolved Post Ablation     Past Surgical History:   Procedure Laterality Date    APPENDECTOMY      CARDIAC ELECTROPHYSIOLOGY MAPPING AND ABLATION  2019    CARPAL TUNNEL RELEASE Right      SECTION      HERNIA REPAIR      JOINT REPLACEMENT Right     Knee    JOINT REPLACEMENT Right     Hip    KNEE ARTHROSCOPY Right     therapeutic     KNEE SURGERY Left     Cartilage repair    THYROID LOBECTOMY Left 2021    Procedure: HEMITHYROIDECTOMY;  Surgeon: Burt Disla MD;  [facility];  Service: Surgical Oncology    TONSILLECTOMY     [place] [id number] THYROID BIOPSY  2021     Social History   Social History     Substance and Sexual Activity   Alcohol Use Not Currently    Alcohol/week: 0 0 standard drinks     Social History     Substance and Sexual Activity   Drug Use Never     Social History     Tobacco Use   Smoking Status Former Smoker    Packs/day: 0 00    Years: 0 00    Pack years: 0 00    Quit date: 12    Years since quittin 5   Smokeless Tobacco Never Used     Family History   Problem Relation Age of Onset    Arthritis Mother     Other Mother         CABG    Cancer Mother     Heart disease Mother    Wisam Cronin Osteoporosis Mother     Lung cancer Father     Pancreatic cancer Father      Meds/Allergies   No Known Allergies    Meds:  Prior to Admission medications    Medication Sig Start Date End Date Taking?  Authorizing Provider   Aspirin 81 MG EC tablet Take 1 tablet by mouth daily    Historical Provider, MD   guaifenesin-codeine (GUAIFENESIN AC) 100-10 MG/5ML liquid Take 5-10 mL by mouth 4 (four) times a day as needed for cough  Patient not taking: No sig reported 1/12/22   Raisa Guillaume MD   hydrochlorothiazide (HYDRODIURIL) 25 mg tablet TAKE 1 TABLET BY MOUTH EVERY DAY 3/8/22   Raisa Guillaume MD   levothyroxine 50 mcg tablet TAKE 1 TABLET BY MOUTH EVERY DAY 4/26/22   Raisa Guillaume MD   magnesium Oxide (MAG-OX) 400 mg TABS Take 1 tablet by mouth daily    Historical Provider, MD   metFORMIN (GLUCOPHAGE) 850 mg tablet TAKE 1 TABLET BY MOUTH TWICE A DAY WITH BREAKFAST AND DINNER 4/26/22   Raisa Guillaume MD   metoprolol succinate (TOPROL-XL) 25 mg 24 hr tablet Take 1 tablet (25 mg total) by mouth daily 6/14/22   Margot Muñiz MD   Multiple Vitamins-Minerals (CENTRUM SILVER 50+WOMEN PO) Take 1 tablet by mouth daily     Historical Provider, MD   Potassium 99 MG TABS Take 1 tablet by mouth daily    Historical Provider, MD   quinapril (ACCUPRIL) 20 mg tablet TAKE 1 TABLET BY MOUTH EVERY DAY 4/26/22   Raisa Guillaume MD   simvastatin (ZOCOR) 10 mg tablet TAKE 1 TABLET BY MOUTH EVERY DAY 4/26/22   Raisa Guillaume MD       Subjective:     Review of Systems:    General: negative for - chills, fatigue, fever,  weight gain or weight loss  Psychological: negative for - anxiety, behavioral disorder, concentration difficulties, decreased libido, depression, irritability, memory difficulties, mood swings, sleep disturbances or suicidal ideation  ENT: negative for - hearing difficulties , nasal congestion, nasal discharge, oral lesions, sinus pain, sneezing, sore throat  Allergy and Immunology: negative for - hives, insect bite sensitivity,  Hematological and Lymphatic: negative for - bleeding problems, blood clots,bruising, swollen lymph nodes  Endocrine: negative for - hair pattern changes, hot flashes, malaise/lethargy, mood swings, palpitations, polydipsia/polyuria, skin changes, temperature intolerance or unexpected weight change  Respiratory: negative for - cough, hemoptysis, orthopnea, shortness of breath, or wheezing  Cardiovascular: negative for - chest pain, dyspnea on exertion, edema,  Gastrointestinal: negative for - abdominal pain, nausea/vomiting  Genito-Urinary: negative for - dysuria, incontinence, irregular/heavy menses or urinary frequency/urgency  Musculoskeletal: negative for - gait disturbance, joint pain, joint stiffness, joint swelling, muscle pain, muscular weakness  Dermatological:  As in HPI  Neurological: negative for confusion, dizziness, headaches, impaired coordination/balance, memory loss, numbness/tingling, seizures, speech problems, tremors or weakness       Objective:   Ht 5' 4 5" (1 638 m)   Wt 103 kg (227 lb)   BMI 38 36 kg/m²     Physical Exam:    General Appearance:    Alert, cooperative, no distress   Head:    Normocephalic, without obvious abnormality, atraumatic           Skin:   A full skin exam was performed including scalp, head scalp, eyes, ears, nose, lips, neck, chest, axilla, abdomen, back, buttocks, bilateral upper extremities, bilateral lower extremities, hands, feet, fingers, toes, fingernails, and toenails previous sites skin cancers well healed without recurrence normal keratotic papules greasy stuck on appearance nothing else remarkable noted on complete exam     Assessment:     1  Seborrheic keratosis     2  Screening for skin condition     3   History of skin cancer           Plan:   Seborrheic keratosis patient reassured these are normal growths we acquire with age no treatment needed  History of skin cancer in no recurrence nothing else atypical sunblock recommended follow-up in 6 months  Screening for dermatologic disorders nothing else of concern noted on complete exam follow-up in 6 months    Kodi Pinto MD  7/7/2022,3:08 PM    Portions of the record may have been created with voice recognition software   Occasional wrong word or "sound a like" substitutions may have occurred due to the inherent limitations of voice recognition software   Read the chart carefully and recognize, using context, where substitutions have occurred

## 2022-08-12 ENCOUNTER — RA CDI HCC (OUTPATIENT)
Dept: OTHER | Facility: HOSPITAL | Age: 82
End: 2022-08-12

## 2022-08-12 ENCOUNTER — APPOINTMENT (OUTPATIENT)
Dept: LAB | Facility: HOSPITAL | Age: 82
End: 2022-08-12
Payer: MEDICARE

## 2022-08-12 DIAGNOSIS — E11.9 TYPE 2 DIABETES MELLITUS WITHOUT COMPLICATION, WITHOUT LONG-TERM CURRENT USE OF INSULIN (HCC): ICD-10-CM

## 2022-08-12 DIAGNOSIS — E78.5 DYSLIPIDEMIA: ICD-10-CM

## 2022-08-12 DIAGNOSIS — Z13.0 SCREENING FOR IRON DEFICIENCY ANEMIA: ICD-10-CM

## 2022-08-12 DIAGNOSIS — E03.9 ACQUIRED HYPOTHYROIDISM: ICD-10-CM

## 2022-08-12 DIAGNOSIS — N18.31 STAGE 3A CHRONIC KIDNEY DISEASE (HCC): ICD-10-CM

## 2022-08-12 DIAGNOSIS — I10 ESSENTIAL HYPERTENSION: ICD-10-CM

## 2022-08-12 LAB
ALBUMIN SERPL BCP-MCNC: 3.9 G/DL (ref 3.5–5)
ALP SERPL-CCNC: 65 U/L (ref 46–116)
ALT SERPL W P-5'-P-CCNC: 18 U/L (ref 12–78)
ANION GAP SERPL CALCULATED.3IONS-SCNC: 9 MMOL/L (ref 4–13)
AST SERPL W P-5'-P-CCNC: 16 U/L (ref 5–45)
BASOPHILS # BLD AUTO: 0.04 THOUSANDS/ΜL (ref 0–0.1)
BASOPHILS NFR BLD AUTO: 1 % (ref 0–1)
BILIRUB SERPL-MCNC: 0.49 MG/DL (ref 0.2–1)
BILIRUB UR QL STRIP: NEGATIVE
BUN SERPL-MCNC: 19 MG/DL (ref 5–25)
CALCIUM SERPL-MCNC: 9.4 MG/DL (ref 8.3–10.1)
CHLORIDE SERPL-SCNC: 102 MMOL/L (ref 96–108)
CHOLEST SERPL-MCNC: 160 MG/DL
CLARITY UR: CLEAR
CO2 SERPL-SCNC: 31 MMOL/L (ref 21–32)
COLOR UR: YELLOW
CREAT SERPL-MCNC: 1.05 MG/DL (ref 0.6–1.3)
CREAT UR-MCNC: 63.9 MG/DL
EOSINOPHIL # BLD AUTO: 0.16 THOUSAND/ΜL (ref 0–0.61)
EOSINOPHIL NFR BLD AUTO: 2 % (ref 0–6)
ERYTHROCYTE [DISTWIDTH] IN BLOOD BY AUTOMATED COUNT: 13.1 % (ref 11.6–15.1)
GFR SERPL CREATININE-BSD FRML MDRD: 50 ML/MIN/1.73SQ M
GLUCOSE P FAST SERPL-MCNC: 136 MG/DL (ref 65–99)
GLUCOSE UR STRIP-MCNC: NEGATIVE MG/DL
HCT VFR BLD AUTO: 41.9 % (ref 34.8–46.1)
HDLC SERPL-MCNC: 60 MG/DL
HGB BLD-MCNC: 13.6 G/DL (ref 11.5–15.4)
HGB UR QL STRIP.AUTO: NEGATIVE
IMM GRANULOCYTES # BLD AUTO: 0.03 THOUSAND/UL (ref 0–0.2)
IMM GRANULOCYTES NFR BLD AUTO: 1 % (ref 0–2)
KETONES UR STRIP-MCNC: NEGATIVE MG/DL
LDLC SERPL CALC-MCNC: 77 MG/DL (ref 0–100)
LEUKOCYTE ESTERASE UR QL STRIP: NEGATIVE
LYMPHOCYTES # BLD AUTO: 1.77 THOUSANDS/ΜL (ref 0.6–4.47)
LYMPHOCYTES NFR BLD AUTO: 27 % (ref 14–44)
MCH RBC QN AUTO: 29.7 PG (ref 26.8–34.3)
MCHC RBC AUTO-ENTMCNC: 32.5 G/DL (ref 31.4–37.4)
MCV RBC AUTO: 92 FL (ref 82–98)
MICROALBUMIN UR-MCNC: 5.7 MG/L (ref 0–20)
MICROALBUMIN/CREAT 24H UR: 9 MG/G CREATININE (ref 0–30)
MONOCYTES # BLD AUTO: 0.44 THOUSAND/ΜL (ref 0.17–1.22)
MONOCYTES NFR BLD AUTO: 7 % (ref 4–12)
NEUTROPHILS # BLD AUTO: 4.13 THOUSANDS/ΜL (ref 1.85–7.62)
NEUTS SEG NFR BLD AUTO: 62 % (ref 43–75)
NITRITE UR QL STRIP: NEGATIVE
NRBC BLD AUTO-RTO: 0 /100 WBCS
PH UR STRIP.AUTO: 6 [PH]
PLATELET # BLD AUTO: 210 THOUSANDS/UL (ref 149–390)
PMV BLD AUTO: 10 FL (ref 8.9–12.7)
POTASSIUM SERPL-SCNC: 4 MMOL/L (ref 3.5–5.3)
PROT SERPL-MCNC: 7.4 G/DL (ref 6.4–8.4)
PROT UR STRIP-MCNC: NEGATIVE MG/DL
RBC # BLD AUTO: 4.58 MILLION/UL (ref 3.81–5.12)
SODIUM SERPL-SCNC: 142 MMOL/L (ref 135–147)
SP GR UR STRIP.AUTO: <=1.005 (ref 1–1.03)
TRIGL SERPL-MCNC: 114 MG/DL
TSH SERPL DL<=0.05 MIU/L-ACNC: 1.27 UIU/ML (ref 0.45–4.5)
UROBILINOGEN UR QL STRIP.AUTO: 0.2 E.U./DL
WBC # BLD AUTO: 6.57 THOUSAND/UL (ref 4.31–10.16)

## 2022-08-12 PROCEDURE — 85025 COMPLETE CBC W/AUTO DIFF WBC: CPT

## 2022-08-12 PROCEDURE — 80061 LIPID PANEL: CPT

## 2022-08-12 PROCEDURE — 82570 ASSAY OF URINE CREATININE: CPT

## 2022-08-12 PROCEDURE — 82043 UR ALBUMIN QUANTITATIVE: CPT

## 2022-08-12 PROCEDURE — 84443 ASSAY THYROID STIM HORMONE: CPT

## 2022-08-12 PROCEDURE — 81003 URINALYSIS AUTO W/O SCOPE: CPT

## 2022-08-12 PROCEDURE — 80053 COMPREHEN METABOLIC PANEL: CPT

## 2022-08-12 PROCEDURE — 36415 COLL VENOUS BLD VENIPUNCTURE: CPT

## 2022-08-12 NOTE — PROGRESS NOTES
[facility]  coding opportunities     E11 22 and E11 36     Chart Reviewed number of suggestions sent to Provider: 2     Patients Insurance     Medicare Insurance: Medicare

## 2022-08-17 NOTE — PROGRESS NOTES
Assessment and Plan:     Problem List Items Addressed This Visit        Endocrine    Type 2 diabetes mellitus with mild nonproliferative retinopathy of both eyes without macular edema ([facility])     A1c is 6 6  She is taking metformin  Continue metformin, continue attempts at losing weight, try to get some exercise  Continue dietary compliance  Lab Results   Component Value Date    HGBA1C 6 6 (A) 08/18/2022            Acquired hypothyroidism     She is status post partial thyroidectomy  Her TSH is at goal   She is taking levothyroxine 50 mcg  We will continue that dose  RESOLVED: Type 2 diabetes mellitus without complication, without long-term current use of insulin ([facility])    Relevant Orders    POCT hemoglobin A1c (Completed)       Cardiovascular and Mediastinum    SVT (supraventricular tachycardia) ([facility])     She is taking metoprolol and it is controlling her SVT    Continue metoprolol, continue follow-up with Cardiology  Essential hypertension     Her blood pressure today was 132/70  She is taking hydrochlorothiazide and quinapril  She also takes metformin for SVT  Continue those medications, low-salt diet, attempts at losing weight and getting some more exercise  Genitourinary    Stage 3 chronic kidney disease ([facility] [address] )     Lab Results   Component Value Date    EGFR 50 08/12/2022    EGFR 61 08/11/2021    EGFR 58 05/24/2021    CREATININE 1 05 08/12/2022    CREATININE 0 90 08/11/2021    CREATININE 0 94 05/24/2021     She admits to not drinking much water  I have encouraged her to do so  Also she should avoid NSAIDs  Also, continue excellent control of diabetes and hypertension  Other    Dyslipidemia     She is taking simvastatin mg  Her last LDL two weeks ago was 77  Continue simvastatin, low-fat diet, attempts at losing weight and getting some exercise  Morbid obesity with BMI of 40 0-44 9, adult ([facility])     BMI Counseling: Body mass index is 38 02 kg/m²   The BMI is above normal  Nutrition recommendations include reducing portion sizes, decreasing overall calorie intake and 3-5 servings of fruits/vegetables daily  Exercise recommendations include exercising 3-5 times per week  S/P partial thyroidectomy      Other Visit Diagnoses     Preventative health care    -  Primary    Asymptomatic postmenopausal state              Depression Screening and Follow-up Plan: Patient was screened for depression during today's encounter  They screened negative with a PHQ-2 score of 0  Preventive health issues were discussed with patient, and age appropriate screening tests were ordered as noted in patient's After Visit Summary  Personalized health advice and appropriate referrals for health education or preventive services given if needed, as noted in patient's After Visit Summary  History of Present Illness:     Patient presents for a Medicare Wellness Visit    This is an 43-year-old woman who lives with her   Her daughter is living next door  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, mostly at [employer]  She declines DEXA scan  She is aged out of cancer screening  Patient Care Team:  Keith Rush MD as PCP - MD Cammie Marques MD Jordis Pert, MD (Surgical Oncology)     Review of Systems:     Review of Systems   Constitutional: Negative for chills and fever  HENT: Negative for ear pain, postnasal drip, rhinorrhea, sore throat and trouble swallowing  Eyes: Negative for pain, redness and visual disturbance  Respiratory: Negative for cough and shortness of breath  Cardiovascular: Negative for chest pain and palpitations  Gastrointestinal: Negative for abdominal pain, constipation, diarrhea and vomiting  Genitourinary: Negative for dysuria, frequency, hematuria and urgency  Musculoskeletal: Negative for arthralgias, back pain, joint swelling and myalgias     Skin: Negative for color change and rash  Neurological: Negative for seizures and syncope  Hematological: Negative for adenopathy  Psychiatric/Behavioral: Negative for decreased concentration, dysphoric mood and sleep disturbance  The patient is not nervous/anxious  All other systems reviewed and are negative         Problem List:     Patient Active Problem List   Diagnosis    SVT (supraventricular tachycardia) (HCC)    Essential hypertension    Dyslipidemia    LBBB (left bundle branch block)    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)    Morbid obesity with BMI of 40 0-44 9, adult ([facility] )    Acquired hypothyroidism    Stage 3 chronic kidney disease ([facility])    Multiple thyroid nodules    S/P partial thyroidectomy      Past Medical and Surgical History:     Past Medical History:   Diagnosis Date    Chronic kidney disease     Stage III    Diabetes mellitus ([facility] )     Disease of thyroid gland     Hyperlipidemia     Hypertension     Irregular heart beat     LBBB (left bundle branch block)     Palpitations     SVT (supraventricular tachycardia) ([facility] ) 2019    Resolved Post Ablation     Past Surgical History:   Procedure Laterality Date    APPENDECTOMY      CARDIAC ELECTROPHYSIOLOGY MAPPING AND ABLATION  2019    CARPAL TUNNEL RELEASE Right      SECTION      HERNIA REPAIR      JOINT REPLACEMENT Right     Knee    JOINT REPLACEMENT Right     Hip    KNEE ARTHROSCOPY Right     therapeutic     KNEE SURGERY Left     Cartilage repair    THYROID LOBECTOMY Left 2021    Procedure: HEMITHYROIDECTOMY;  Surgeon: Gerardo Teixeira MD;  [facility];  Service: Surgical Oncology    TONSILLECTOMY      US GUIDED THYROID BIOPSY  2021      Family History:     Family History   Problem Relation Age of Onset    Arthritis Mother     Other Mother         CABG    Cancer Mother     Heart disease Mother     Osteoporosis Mother     Lung cancer Father  Pancreatic cancer Father       Social History:     Social History     Socioeconomic History    Marital status: /Civil Union     Spouse name: None    Number of children: None    Years of education: None    Highest education level: None   Occupational History    None   Tobacco Use    Smoking status: Former Smoker     Packs/day: 0 00     Years: 0 00     Pack years: 0 00     Quit date:      Years since quittin 6    Smokeless tobacco: Never Used   Vaping Use    Vaping Use: Never used   Substance and Sexual Activity    Alcohol use: Not Currently     Alcohol/week: 0 0 standard drinks    Drug use: Never    Sexual activity: None   Other Topics Concern    None   Social History Narrative    Caffeine use      Social Determinants of Health     Financial Resource Strain: Not on file   Food Insecurity: Not on file   Transportation Needs: Not on file   Physical Activity: Not on file   Stress: Not on file   Social Connections: Not on file   Intimate Partner Violence: Not on file   Housing Stability: Not on file      Medications and Allergies:     Current Outpatient Medications   Medication Sig Dispense Refill    Aspirin 81 MG EC tablet Take 1 tablet by mouth daily      hydrochlorothiazide (HYDRODIURIL) 25 mg tablet TAKE 1 TABLET BY MOUTH EVERY DAY 90 tablet 1    levothyroxine 50 mcg tablet TAKE 1 TABLET BY MOUTH EVERY DAY 90 tablet 1    magnesium Oxide (MAG-OX) 400 mg TABS Take 1 tablet by mouth daily      metFORMIN (GLUCOPHAGE) 850 mg tablet TAKE 1 TABLET BY MOUTH TWICE A DAY WITH BREAKFAST AND DINNER 180 tablet 1    metoprolol succinate (TOPROL-XL) 25 mg 24 hr tablet Take 1 tablet (25 mg total) by mouth daily 90 tablet 3    Multiple Vitamins-Minerals (CENTRUM SILVER 50+WOMEN PO) Take 1 tablet by mouth daily       Potassium 99 MG TABS Take 1 tablet by mouth daily      quinapril (ACCUPRIL) 20 mg tablet TAKE 1 TABLET BY MOUTH EVERY DAY 90 tablet 1    simvastatin (ZOCOR) 10 mg tablet TAKE 1 TABLET BY MOUTH EVERY DAY 90 tablet 1     No current facility-administered medications for this visit  No Known Allergies   Immunizations:     Immunization History   Administered Date(s) Administered    COVID-19 PFIZER VACCINE 0 3 ML IM 01/22/2021, 02/12/2021, 01/14/2022    Hep A, adult 01/28/2015, 07/28/2015    INFLUENZA 10/27/2018, 10/11/2019, 09/05/2020, 09/25/2021    Influenza, seasonal, injectable 11/15/2010, 10/16/2015    Pneumococcal Conjugate 13-Valent 07/18/2017    Pneumococcal Polysaccharide PPV23 07/12/2016    Zoster Vaccine Recombinant 09/08/2020, 03/15/2021      Health Maintenance: There are no preventive care reminders to display for this patient  Topic Date Due    COVID-19 Vaccine (4 - Booster for Pfizer series) 05/14/2022    Influenza Vaccine (1) 09/01/2022      Medicare Screening Tests and Risk Assessments:     April is here for her Subsequent Wellness visit  Health Risk Assessment:   Patient rates overall health as excellent  Patient feels that their physical health rating is much better  Patient is very satisfied with their life  Eyesight was rated as same  Hearing was rated as same  Patient feels that their emotional and mental health rating is much better  Patients states they are never, rarely angry  Patient states they are never, rarely unusually tired/fatigued  Pain experienced in the last 7 days has been none  Patient states that she has experienced no weight loss or gain in last 6 months  Fall Risk Screening: In the past year, patient has experienced: no history of falling in past year      Urinary Incontinence Screening:   Patient has not leaked urine accidently in the last six months  Home Safety:  Patient does not have trouble with stairs inside or outside of their home  Patient has working smoke alarms and has working carbon monoxide detector  Home safety hazards include: none  Nutrition:   Current diet is Regular       Medications:   Patient is not currently taking any over-the-counter supplements  Patient is able to manage medications  Activities of Daily Living (ADLs)/Instrumental Activities of Daily Living (IADLs):   Walk and transfer into and out of bed and chair?: Yes  Dress and groom yourself?: Yes    Bathe or shower yourself?: Yes    Feed yourself? Yes  Do your laundry/housekeeping?: Yes  Manage your money, pay your bills and track your expenses?: Yes  Make your own meals?: Yes    Do your own shopping?: Yes    Previous Hospitalizations:   Any hospitalizations or ED visits within the last 12 months?: No      Advance Care Planning:   Living will: Yes    Durable POA for healthcare: Yes    Advanced directive: Yes      PREVENTIVE SCREENINGS      Cardiovascular Screening:    General: Screening Not Indicated and History Lipid Disorder      Diabetes Screening:     General: Screening Not Indicated and History Diabetes      Cervical Cancer Screening:    General: Screening Not Indicated      Lung Cancer Screening:     General: Screening Not Indicated    Screening, Brief Intervention, and Referral to Treatment (SBIRT)    Screening  Typical number of drinks in a day: 0  Typical number of drinks in a week: 0  Interpretation: Low risk drinking behavior      AUDIT-C Screenin) How often did you have a drink containing alcohol in the past year? monthly or less  2) How many drinks did you have on a typical day when you were drinking in the past year? 0  3) How often did you have 6 or more drinks on one occasion in the past year? never    AUDIT-C Score: 1  Interpretation: Score 0-2 (female): Negative screen for alcohol misuse    Single Item Drug Screening:  How often have you used an illegal drug (including marijuana) or a prescription medication for non-medical reasons in the past year? never    Single Item Drug Screen Score: 0  Interpretation: Negative screen for possible drug use disorder    No exam data present     Physical Exam:     /70 (BP Location: Left arm, Patient Position: Sitting, Cuff Size: Large)   Pulse 70   Resp 18   Ht 5' 4 5" (1 638 m)   Wt 102 kg (225 lb)   BMI 38 02 kg/m²     Physical Exam  Vitals and nursing note reviewed  Constitutional:       Appearance: Normal appearance  She is well-developed  She is obese  HENT:      Head: Normocephalic  Right Ear: Tympanic membrane and ear canal normal       Left Ear: Tympanic membrane and ear canal normal       Nose: Nose normal  No mucosal edema, congestion or rhinorrhea  Mouth/Throat:      Mouth: Mucous membranes are moist       Pharynx: Uvula midline  No oropharyngeal exudate  Tonsils: No tonsillar exudate  Eyes:      General: Lids are normal       Conjunctiva/sclera: Conjunctivae normal       Pupils: Pupils are equal, round, and reactive to light  Neck:      Thyroid: No thyromegaly  Vascular: No carotid bruit  Trachea: Trachea normal    Cardiovascular:      Rate and Rhythm: Normal rate and regular rhythm  Pulses: Normal pulses  Heart sounds: Normal heart sounds, S1 normal and S2 normal  No murmur heard  Pulmonary:      Effort: Pulmonary effort is normal  No respiratory distress  Breath sounds: Normal breath sounds  No wheezing, rhonchi or rales  Abdominal:      General: Bowel sounds are normal       Palpations: Abdomen is soft  Tenderness: There is no abdominal tenderness  Hernia: No hernia is present  Musculoskeletal:         General: No swelling or deformity  Normal range of motion  Cervical back: Normal range of motion  Lymphadenopathy:      Cervical: No cervical adenopathy  Skin:     General: Skin is warm and dry  Findings: No rash  Neurological:      General: No focal deficit present  Mental Status: She is alert and oriented to person, place, and time  Mental status is at baseline  Cranial Nerves: No cranial nerve deficit  Sensory: No sensory deficit        Coordination: Coordination normal       Deep Tendon Reflexes: Reflexes are normal and symmetric  Psychiatric:         Mood and Affect: Mood normal          Speech: Speech normal          Behavior: Behavior normal  Behavior is cooperative  Thought Content:  Thought content normal          Judgment: Judgment normal           Sheba Ramos MD

## 2022-08-18 ENCOUNTER — OFFICE VISIT (OUTPATIENT)
Dept: FAMILY MEDICINE CLINIC | Facility: MEDICAL CENTER | Age: 82
End: 2022-08-18
Payer: MEDICARE

## 2022-08-18 VITALS
DIASTOLIC BLOOD PRESSURE: 70 MMHG | HEIGHT: 65 IN | BODY MASS INDEX: 37.49 KG/M2 | HEART RATE: 70 BPM | WEIGHT: 225 LBS | RESPIRATION RATE: 18 BRPM | SYSTOLIC BLOOD PRESSURE: 132 MMHG

## 2022-08-18 DIAGNOSIS — E66.01 MORBID OBESITY WITH BMI OF 40.0-44.9, ADULT (HCC): ICD-10-CM

## 2022-08-18 DIAGNOSIS — Z78.0 ASYMPTOMATIC POSTMENOPAUSAL STATE: ICD-10-CM

## 2022-08-18 DIAGNOSIS — E11.9 TYPE 2 DIABETES MELLITUS WITHOUT COMPLICATION, WITHOUT LONG-TERM CURRENT USE OF INSULIN (HCC): ICD-10-CM

## 2022-08-18 DIAGNOSIS — N18.31 STAGE 3A CHRONIC KIDNEY DISEASE (HCC): ICD-10-CM

## 2022-08-18 DIAGNOSIS — Z00.00 PREVENTATIVE HEALTH CARE: Primary | ICD-10-CM

## 2022-08-18 DIAGNOSIS — E89.0 S/P PARTIAL THYROIDECTOMY: ICD-10-CM

## 2022-08-18 DIAGNOSIS — E78.5 DYSLIPIDEMIA: ICD-10-CM

## 2022-08-18 DIAGNOSIS — E03.9 ACQUIRED HYPOTHYROIDISM: ICD-10-CM

## 2022-08-18 DIAGNOSIS — I10 ESSENTIAL HYPERTENSION: ICD-10-CM

## 2022-08-18 DIAGNOSIS — E11.3293 TYPE 2 DIABETES MELLITUS WITH BOTH EYES AFFECTED BY MILD NONPROLIFERATIVE RETINOPATHY WITHOUT MACULAR EDEMA, WITHOUT LONG-TERM CURRENT USE OF INSULIN (HCC): ICD-10-CM

## 2022-08-18 DIAGNOSIS — I47.1 SVT (SUPRAVENTRICULAR TACHYCARDIA) (HCC): ICD-10-CM

## 2022-08-18 PROBLEM — I47.10 PAROXYSMAL SVT (SUPRAVENTRICULAR TACHYCARDIA): Status: RESOLVED | Noted: 2018-07-03 | Resolved: 2022-08-18

## 2022-08-18 LAB — SL AMB POCT HEMOGLOBIN AIC: 6.6 (ref ?–6.5)

## 2022-08-18 PROCEDURE — G0439 PPPS, SUBSEQ VISIT: HCPCS | Performed by: FAMILY MEDICINE

## 2022-08-18 PROCEDURE — 83036 HEMOGLOBIN GLYCOSYLATED A1C: CPT | Performed by: FAMILY MEDICINE

## 2022-08-18 PROCEDURE — 99214 OFFICE O/P EST MOD 30 MIN: CPT | Performed by: FAMILY MEDICINE

## 2022-08-18 NOTE — ASSESSMENT & PLAN NOTE
She is taking simvastatin mg  Her last LDL two weeks ago was 77  Continue simvastatin, low-fat diet, attempts at losing weight and getting some exercise

## 2022-08-18 NOTE — ASSESSMENT & PLAN NOTE
A1c is 6 6  She is taking metformin  Continue metformin, continue attempts at losing weight, try to get some exercise  Continue dietary compliance      Lab Results   Component Value Date    HGBA1C 6 6 (A) 08/18/2022

## 2022-08-18 NOTE — ASSESSMENT & PLAN NOTE
She is status post partial thyroidectomy  Her TSH is at goal   She is taking levothyroxine 50 mcg  We will continue that dose

## 2022-08-18 NOTE — ASSESSMENT & PLAN NOTE
Lab Results   Component Value Date    EGFR 50 08/12/2022    EGFR 61 08/11/2021    EGFR 58 05/24/2021    CREATININE 1 05 08/12/2022    CREATININE 0 90 08/11/2021    CREATININE 0 94 05/24/2021     She admits to not drinking much water  I have encouraged her to do so  Also she should avoid NSAIDs  Also, continue excellent control of diabetes and hypertension

## 2022-08-18 NOTE — ASSESSMENT & PLAN NOTE
She is taking metoprolol and it is controlling her SVT    Continue metoprolol, continue follow-up with Cardiology

## 2022-08-18 NOTE — ASSESSMENT & PLAN NOTE
BMI Counseling: Body mass index is 38 02 kg/m²  The BMI is above normal  Nutrition recommendations include reducing portion sizes, decreasing overall calorie intake and 3-5 servings of fruits/vegetables daily  Exercise recommendations include exercising 3-5 times per week

## 2022-08-18 NOTE — ASSESSMENT & PLAN NOTE
Her blood pressure today was 132/70  She is taking hydrochlorothiazide and quinapril  She also takes metformin for SVT  Continue those medications, low-salt diet, attempts at losing weight and getting some more exercise

## 2022-09-02 DIAGNOSIS — I10 ESSENTIAL HYPERTENSION: ICD-10-CM

## 2022-09-02 RX ORDER — HYDROCHLOROTHIAZIDE 25 MG/1
TABLET ORAL
Qty: 90 TABLET | Refills: 1 | Status: SHIPPED | OUTPATIENT
Start: 2022-09-02

## 2022-10-19 DIAGNOSIS — E78.2 MIXED HYPERLIPIDEMIA: ICD-10-CM

## 2022-10-19 DIAGNOSIS — E11.9 TYPE 2 DIABETES MELLITUS WITHOUT COMPLICATION, WITHOUT LONG-TERM CURRENT USE OF INSULIN (HCC): ICD-10-CM

## 2022-10-19 RX ORDER — SIMVASTATIN 10 MG
TABLET ORAL
Qty: 90 TABLET | Refills: 1 | Status: SHIPPED | OUTPATIENT
Start: 2022-10-19

## 2022-10-20 ENCOUNTER — OFFICE VISIT (OUTPATIENT)
Dept: FAMILY MEDICINE CLINIC | Facility: MEDICAL CENTER | Age: 82
End: 2022-10-20
Payer: MEDICARE

## 2022-10-20 VITALS
BODY MASS INDEX: 38.77 KG/M2 | SYSTOLIC BLOOD PRESSURE: 130 MMHG | DIASTOLIC BLOOD PRESSURE: 70 MMHG | TEMPERATURE: 98 F | HEART RATE: 80 BPM | WEIGHT: 229.4 LBS

## 2022-10-20 DIAGNOSIS — H61.23 IMPACTED CERUMEN OF BOTH EARS: Primary | ICD-10-CM

## 2022-10-20 PROCEDURE — 99213 OFFICE O/P EST LOW 20 MIN: CPT

## 2022-10-20 NOTE — PROGRESS NOTES
Assessment/Plan:    Ear cerumen removed  Advised patient to use Debrox drops to right ear for remaining cerumen  Avoid use of Q-tips  1  Impacted cerumen of both ears  Ear cerumen removed by MICH Guzman LPN  Left ear canal clear, right ear cerumen loosened up and some removed however some cerumen remains in canal    Advised patient to use over-the-counter wax softening drops such as Debrox to right ear  Avoid use of Q-tips  May resume use of hearing aids  - Ear cerumen removal      Subjective:      Patient ID: April Keith Certain is a [de-identified] y o  female  [de-identified]year old female presents with complaint of bilateral ears feeling clogged, right worse than left x 1 month since starting to wear hearing aids  Reports muffled hearing  Denies pain, drainage, fever  She does admit to using Q-tips regularly  She offers no other concerns or complaints today  The following portions of the patient's history were reviewed and updated as appropriate: allergies, current medications, past family history, past social history, past surgical history and problem list     Review of Systems   Constitutional: Negative  HENT: Negative for ear discharge and ear pain  Ears feeling clogged, muffled hearing   Eyes: Negative  Respiratory: Negative  Cardiovascular: Negative  Gastrointestinal: Negative  Endocrine: Negative  Genitourinary: Negative  Musculoskeletal: Negative  Skin: Negative  Allergic/Immunologic: Negative  Neurological: Negative  Hematological: Negative  Psychiatric/Behavioral: Negative  Objective:      /70 (Cuff Size: Large)   Pulse 80   Temp 98 °F (36 7 °C)   Wt 104 kg (229 lb 6 4 oz)   BMI 38 77 kg/m²          Physical Exam  Constitutional:       General: She is not in acute distress  Appearance: Normal appearance  She is not ill-appearing  HENT:      Head: Normocephalic and atraumatic  Right Ear: There is impacted cerumen  Left Ear:  There is impacted cerumen  Nose: Nose normal    Eyes:      Conjunctiva/sclera: Conjunctivae normal       Pupils: Pupils are equal, round, and reactive to light  Cardiovascular:      Rate and Rhythm: Normal rate  Pulses: Normal pulses  Pulmonary:      Effort: Pulmonary effort is normal    Musculoskeletal:      Cervical back: Normal range of motion and neck supple  Skin:     General: Skin is warm and dry  Neurological:      Mental Status: She is alert  Psychiatric:         Mood and Affect: Mood normal          Thought Content:  Thought content normal                     Liliya King

## 2022-10-20 NOTE — PROGRESS NOTES
Ear cerumen removal    Date/Time: 10/20/2022 11:45 AM  Performed by: Andres Dhillon  Authorized by: CHARIS Schwab   Universal Protocol:  Consent: Verbal consent obtained  Risks and benefits: risks, benefits and alternatives were discussed  Consent given by: patient  Timeout called at: 10/20/2022 11:46 AM   Patient understanding: patient states understanding of the procedure being performed  Patient identity confirmed: verbally with patient      Patient location:  Clinic  Procedure details:     Local anesthetic:  None    Location:  L ear and R ear    Procedure type: irrigation only      Approach:  External  Post-procedure details:     Complication:  None    Hearing quality:  Improved    Patient tolerance of procedure:   Tolerated well, no immediate complications

## 2022-11-23 DIAGNOSIS — I10 HYPERTENSION, UNSPECIFIED TYPE: ICD-10-CM

## 2022-11-23 RX ORDER — QUINAPRIL 20 MG/1
TABLET ORAL
Qty: 90 TABLET | Refills: 1 | Status: SHIPPED | OUTPATIENT
Start: 2022-11-23

## 2023-01-02 DIAGNOSIS — E03.9 HYPOTHYROIDISM, UNSPECIFIED TYPE: ICD-10-CM

## 2023-01-03 RX ORDER — LEVOTHYROXINE SODIUM 0.05 MG/1
TABLET ORAL
Qty: 90 TABLET | Refills: 1 | Status: SHIPPED | OUTPATIENT
Start: 2023-01-03

## 2023-02-15 ENCOUNTER — RA CDI HCC (OUTPATIENT)
Dept: OTHER | Facility: HOSPITAL | Age: 83
End: 2023-02-15

## 2023-02-15 NOTE — PROGRESS NOTES
[facility]  coding opportunities     E11 22 and E11 36     Chart Reviewed number of suggestions sent to Provider: 2     Patients Insurance     Medicare Insurance: Medicare

## 2023-02-21 ENCOUNTER — OFFICE VISIT (OUTPATIENT)
Dept: FAMILY MEDICINE CLINIC | Facility: MEDICAL CENTER | Age: 83
End: 2023-02-21

## 2023-02-21 VITALS
HEART RATE: 61 BPM | DIASTOLIC BLOOD PRESSURE: 64 MMHG | BODY MASS INDEX: 37.92 KG/M2 | RESPIRATION RATE: 16 BRPM | SYSTOLIC BLOOD PRESSURE: 124 MMHG | WEIGHT: 227.6 LBS | HEIGHT: 65 IN

## 2023-02-21 DIAGNOSIS — E11.3293 TYPE 2 DIABETES MELLITUS WITH BOTH EYES AFFECTED BY MILD NONPROLIFERATIVE RETINOPATHY WITHOUT MACULAR EDEMA, WITHOUT LONG-TERM CURRENT USE OF INSULIN (HCC): Primary | ICD-10-CM

## 2023-02-21 DIAGNOSIS — E78.5 DYSLIPIDEMIA: ICD-10-CM

## 2023-02-21 DIAGNOSIS — Z13.0 SCREENING FOR IRON DEFICIENCY ANEMIA: ICD-10-CM

## 2023-02-21 DIAGNOSIS — N18.31 STAGE 3A CHRONIC KIDNEY DISEASE (HCC): ICD-10-CM

## 2023-02-21 DIAGNOSIS — E03.9 ACQUIRED HYPOTHYROIDISM: ICD-10-CM

## 2023-02-21 DIAGNOSIS — I10 ESSENTIAL HYPERTENSION: ICD-10-CM

## 2023-02-21 DIAGNOSIS — H61.21 IMPACTED CERUMEN OF RIGHT EAR: ICD-10-CM

## 2023-02-21 DIAGNOSIS — I47.1 SVT (SUPRAVENTRICULAR TACHYCARDIA) (HCC): ICD-10-CM

## 2023-02-21 LAB — SL AMB POCT HEMOGLOBIN AIC: 6.8 (ref ?–6.5)

## 2023-02-21 NOTE — PROGRESS NOTES
Name: Eric Boeyr      : 1940      MRN: [id number]  Encounter Provider: Adria Rausch MD  Encounter Date: 2023   Encounter department: [address] [place] [address]    Assessment & Plan     1  Type 2 diabetes mellitus with both eyes affected by mild nonproliferative retinopathy without macular edema, without long-term current use of insulin ([address] )  Assessment & Plan:  She is doing well with her A1c  The only medication that she is taking for diabetes is the metformin  Continue metformin, she needs to do more exercise and she would benefit from some weight loss as well  Continue dietary compliance  Lab Results   Component Value Date    HGBA1C 6 8 (A) 2023       Orders:  -     POCT hemoglobin A1c  -     Comprehensive metabolic panel; Future; Expected date: 2023  -     Hemoglobin A1C; Future; Expected date: 2023    2  Stage 3a chronic kidney disease [facility])  Assessment & Plan:  Lab Results   Component Value Date    EGFR 50 2022    EGFR 61 2021    EGFR 58 2021    CREATININE 1 05 2022    CREATININE 0 90 2021    CREATININE 0 94 2021     Continue staying well-hydrated  Avoiding NSAIDs  Continue with good control of diabetes and hypertension  Orders:  -     Comprehensive metabolic panel; Future; Expected date: 2023    3  Acquired hypothyroidism  Assessment & Plan:  Thyroid is under good control with levothyroxine 50 mcg daily  She will continue taking that  Orders:  -     TSH, 3rd generation with Free T4 reflex; Future; Expected date: 2023    4  Dyslipidemia  Assessment & Plan:  She is taking simvastatin  Her LDL has been at goal     She will be due another lipid panel in August   Continue simvastatin, continue attempts at losing weight, she would benefit from exercise, low-fat diet  Orders:  -     Lipid Panel with Direct LDL reflex; Future; Expected date: 2023  -     Comprehensive metabolic panel;  Future; Expected date: 08/12/2023    5  Screening for iron deficiency anemia  -     CBC and differential; Future; Expected date: 08/12/2023    6  Impacted cerumen of right ear  -     Ambulatory Referral to Otolaryngology; Future    7  Essential hypertension  Assessment & Plan:  Blood pressure today is 124/64  She is taking Accupril, hydrochlorothiazide, and metoprolol  Metoprolol has a dual purpose  Continue those medications, she needs to do more exercise and she would benefit from losing some weight as well  Also, low-salt diet  8  SVT (supraventricular tachycardia) ([facility] [address] )  Assessment & Plan:  She is taking metoprolol and it has been effective at preventing symptoms  Continue metoprolol, continue following up with cardiology  Subjective      Review of Systems   Constitutional: Negative for chills and fever  HENT: Negative for ear pain, postnasal drip, rhinorrhea, sore throat and trouble swallowing  Eyes: Negative for pain, redness and visual disturbance  Respiratory: Negative for cough and shortness of breath  Cardiovascular: Negative for chest pain and palpitations  Gastrointestinal: Negative for abdominal pain, constipation, diarrhea and vomiting  Genitourinary: Negative for dysuria, frequency, hematuria and urgency  Musculoskeletal: Negative for arthralgias, back pain, joint swelling and myalgias  Skin: Negative for color change and rash  Neurological: Negative for seizures and syncope  Hematological: Negative for adenopathy  Psychiatric/Behavioral: Negative for decreased concentration, dysphoric mood and sleep disturbance  The patient is not nervous/anxious  All other systems reviewed and are negative        Current Outpatient Medications on File Prior to Visit   Medication Sig   • Aspirin 81 MG EC tablet Take 1 tablet by mouth daily   • hydrochlorothiazide (HYDRODIURIL) 25 mg tablet TAKE 1 TABLET BY MOUTH EVERY DAY   • levothyroxine 50 mcg tablet TAKE 1 TABLET BY MOUTH EVERY DAY   • magnesium Oxide (MAG-OX) 400 mg TABS Take 1 tablet by mouth daily   • metFORMIN (GLUCOPHAGE) 850 mg tablet TAKE 1 TABLET BY MOUTH TWICE A DAY WITH BREAKFAST AND DINNER   • metoprolol succinate (TOPROL-XL) 25 mg 24 hr tablet Take 1 tablet (25 mg total) by mouth daily   • Multiple Vitamins-Minerals (CENTRUM SILVER 50+WOMEN PO) Take 1 tablet by mouth daily    • Potassium 99 MG TABS Take 1 tablet by mouth daily   • quinapril (ACCUPRIL) 20 mg tablet TAKE 1 TABLET BY MOUTH EVERY DAY   • simvastatin (ZOCOR) 10 mg tablet TAKE 1 TABLET BY MOUTH EVERY DAY       Objective     /64 (BP Location: Right arm, Patient Position: Sitting, Cuff Size: Large)   Pulse 61   Resp 16   Ht 5' 4 5" (1 638 m)   Wt 103 kg (227 lb 9 6 oz)   BMI 38 46 kg/m²     Physical Exam  Vitals and nursing note reviewed  Constitutional:       Appearance: Normal appearance  She is well-developed  HENT:      Head: Normocephalic and atraumatic  Right Ear: Hearing, tympanic membrane, ear canal and external ear normal       Left Ear: Hearing, tympanic membrane, ear canal and external ear normal       Nose: Nose normal  No mucosal edema or rhinorrhea  Mouth/Throat:      Mouth: Mucous membranes are moist       Pharynx: Oropharynx is clear  Uvula midline  No oropharyngeal exudate or posterior oropharyngeal erythema  Eyes:      General: Lids are normal          Right eye: No discharge  Left eye: No discharge  Extraocular Movements: Extraocular movements intact  Conjunctiva/sclera: Conjunctivae normal       Pupils: Pupils are equal, round, and reactive to light  Neck:      Thyroid: No thyroid mass or thyromegaly  Vascular: No carotid bruit  Cardiovascular:      Rate and Rhythm: Normal rate and regular rhythm  Pulses: Normal pulses  Heart sounds: Normal heart sounds, S1 normal and S2 normal  No murmur heard  No gallop     Pulmonary:      Effort: Pulmonary effort is normal       Breath sounds: Normal breath sounds  No wheezing or rales  Abdominal:      General: Bowel sounds are normal       Palpations: Abdomen is soft  Tenderness: There is no abdominal tenderness  Hernia: No hernia is present  Musculoskeletal:         General: Normal range of motion  Cervical back: Normal range of motion and neck supple  Lymphadenopathy:      Cervical: No cervical adenopathy  Skin:     General: Skin is warm and dry  Findings: No rash  Neurological:      Mental Status: She is oriented to person, place, and time  Cranial Nerves: No cranial nerve deficit  Sensory: No sensory deficit  Coordination: Coordination normal    Psychiatric:         Speech: Speech normal          Behavior: Behavior normal  Behavior is cooperative  Thought Content:  Thought content normal          Judgment: Judgment normal        Lala Pyle MD

## 2023-02-21 NOTE — ASSESSMENT & PLAN NOTE
She is taking simvastatin  Her LDL has been at goal     She will be due another lipid panel in August   Continue simvastatin, continue attempts at losing weight, she would benefit from exercise, low-fat diet

## 2023-02-21 NOTE — ASSESSMENT & PLAN NOTE
She is taking metoprolol and it has been effective at preventing symptoms  Continue metoprolol, continue following up with cardiology

## 2023-02-21 NOTE — ASSESSMENT & PLAN NOTE
She is doing well with her A1c  The only medication that she is taking for diabetes is the metformin  Continue metformin, she needs to do more exercise and she would benefit from some weight loss as well  Continue dietary compliance      Lab Results   Component Value Date    HGBA1C 6 8 (A) 02/21/2023

## 2023-02-21 NOTE — ASSESSMENT & PLAN NOTE
Blood pressure today is 124/64  She is taking Accupril, hydrochlorothiazide, and metoprolol  Metoprolol has a dual purpose  Continue those medications, she needs to do more exercise and she would benefit from losing some weight as well  Also, low-salt diet

## 2023-02-21 NOTE — ASSESSMENT & PLAN NOTE
Lab Results   Component Value Date    EGFR 50 08/12/2022    EGFR 61 08/11/2021    EGFR 58 05/24/2021    CREATININE 1 05 08/12/2022    CREATININE 0 90 08/11/2021    CREATININE 0 94 05/24/2021     Continue staying well-hydrated  Avoiding NSAIDs  Continue with good control of diabetes and hypertension

## 2023-02-26 DIAGNOSIS — I10 ESSENTIAL HYPERTENSION: ICD-10-CM

## 2023-02-27 DIAGNOSIS — I10 HYPERTENSION, UNSPECIFIED TYPE: ICD-10-CM

## 2023-02-27 RX ORDER — RAMIPRIL 5 MG/1
5 CAPSULE ORAL DAILY
Qty: 90 CAPSULE | Refills: 3 | Status: SHIPPED | OUTPATIENT
Start: 2023-02-27

## 2023-02-27 RX ORDER — RAMIPRIL 5 MG/1
CAPSULE ORAL
Qty: 90 CAPSULE | Refills: 3 | Status: SHIPPED | OUTPATIENT
Start: 2023-02-27 | End: 2023-02-27 | Stop reason: SDUPTHER

## 2023-02-27 RX ORDER — HYDROCHLOROTHIAZIDE 25 MG/1
TABLET ORAL
Qty: 90 TABLET | Refills: 1 | Status: SHIPPED | OUTPATIENT
Start: 2023-02-27

## 2023-05-26 DIAGNOSIS — I47.1 PAROXYSMAL SVT (SUPRAVENTRICULAR TACHYCARDIA) (HCC): ICD-10-CM

## 2023-05-26 RX ORDER — METOPROLOL SUCCINATE 25 MG/1
25 TABLET, EXTENDED RELEASE ORAL DAILY
Qty: 90 TABLET | Refills: 3 | Status: SHIPPED | OUTPATIENT
Start: 2023-05-26

## 2023-06-20 ENCOUNTER — HOSPITAL ENCOUNTER (OUTPATIENT)
Dept: ULTRASOUND IMAGING | Facility: CLINIC | Age: 83
Discharge: HOME | End: 2023-06-20
Payer: MEDICARE

## 2023-06-20 DIAGNOSIS — E04.2 MULTIPLE THYROID NODULES: ICD-10-CM

## 2023-06-20 PROCEDURE — 76536 US EXAM OF HEAD AND NECK: CPT

## 2023-06-26 ENCOUNTER — OFFICE VISIT (OUTPATIENT)
Dept: CARDIOLOGY CLINIC | Facility: MEDICAL CENTER | Age: 83
End: 2023-06-26
Payer: MEDICARE

## 2023-06-26 VITALS
SYSTOLIC BLOOD PRESSURE: 130 MMHG | HEIGHT: 65 IN | OXYGEN SATURATION: 100 % | HEART RATE: 79 BPM | BODY MASS INDEX: 38.49 KG/M2 | DIASTOLIC BLOOD PRESSURE: 64 MMHG | WEIGHT: 231 LBS

## 2023-06-26 DIAGNOSIS — I10 ESSENTIAL HYPERTENSION: ICD-10-CM

## 2023-06-26 DIAGNOSIS — E78.5 DYSLIPIDEMIA: ICD-10-CM

## 2023-06-26 DIAGNOSIS — I44.7 LBBB (LEFT BUNDLE BRANCH BLOCK): ICD-10-CM

## 2023-06-26 DIAGNOSIS — I47.1 SVT (SUPRAVENTRICULAR TACHYCARDIA) (HCC): Primary | ICD-10-CM

## 2023-06-26 PROCEDURE — 99214 OFFICE O/P EST MOD 30 MIN: CPT | Performed by: INTERNAL MEDICINE

## 2023-06-26 NOTE — PROGRESS NOTES
Cardiology   April Janette 83 y o  female MRN: [id number]        Impression:  1  Paroxysmal SVT - s/p ablation   Occasional palpitations  2  Hypertension - elevated  3  Dyslipidemia - on statin  4  Chronic LBBB     Recommendations:  1  Continue current medications  2  Follow up in 1 year  HPI: George Nicholson is a 82y o  year old female with paroxysmal SVT s/p ablation , dyslipidemia, LBBB, and hypertension, who returns for follow up  No chest pain, shortness of breath, or palpitations  Review of Systems   Constitutional: Negative  HENT: Negative  Eyes: Negative  Respiratory: Negative for chest tightness and shortness of breath  Cardiovascular: Negative for chest pain, palpitations and leg swelling  Gastrointestinal: Negative  Endocrine: Negative  Genitourinary: Negative  Musculoskeletal: Negative  Skin: Negative  Allergic/Immunologic: Negative  Neurological: Negative  Hematological: Negative  Psychiatric/Behavioral: Negative  All other systems reviewed and are negative          Past Medical History:   Diagnosis Date   • Chronic kidney disease     Stage III   • Diabetes mellitus ([facility] [address] )    • Disease of thyroid gland    • Hyperlipidemia    • Hypertension    • Irregular heart beat    • LBBB (left bundle branch block)    • Palpitations    • SVT (supraventricular tachycardia) ([facility] [address] ) 2019    Resolved Post Ablation     Past Surgical History:   Procedure Laterality Date   • APPENDECTOMY     • CARDIAC ELECTROPHYSIOLOGY MAPPING AND ABLATION  2019   • CARPAL TUNNEL RELEASE Right    •  SECTION     • HERNIA REPAIR     • JOINT REPLACEMENT Right     Knee   • JOINT REPLACEMENT Right     Hip   • KNEE ARTHROSCOPY Right     therapeutic    • KNEE SURGERY Left     Cartilage repair   • THYROID LOBECTOMY Left 2021    Procedure: HEMITHYROIDECTOMY;  Surgeon: Osmany Cr MD;  Location: [facility];  Service: Surgical Oncology   • TONSILLECTOMY     • US GUIDED THYROID BIOPSY  2021     Social History     Substance and Sexual Activity   Alcohol Use Not Currently   • Alcohol/week: 0 0 standard drinks of alcohol     Social History     Substance and Sexual Activity   Drug Use Never     Social History     Tobacco Use   Smoking Status Former   • Packs/day: 0 00   • Years: 0 00   • Total pack years: 0 00   • Types: Cigarettes   • Quit date:    • Years since quittin 11   Smokeless Tobacco Never     Family History   Problem Relation Age of Onset   • Arthritis Mother    • Other Mother         CABG   • Cancer Mother    • Heart disease Mother    • Osteoporosis Mother    • Lung cancer Father    • Pancreatic cancer Father        Allergies:  No Known Allergies    Medications:     Current Outpatient Medications:   •  Aspirin 81 MG EC tablet, Take 1 tablet by mouth daily, Disp: , Rfl:   •  hydrochlorothiazide (HYDRODIURIL) 25 mg tablet, TAKE 1 TABLET BY MOUTH EVERY DAY, Disp: 90 tablet, Rfl: 1  •  levothyroxine 50 mcg tablet, TAKE 1 TABLET BY MOUTH EVERY DAY, Disp: 90 tablet, Rfl: 1  •  magnesium Oxide (MAG-OX) 400 mg TABS, Take 1 tablet by mouth daily, Disp: , Rfl:   •  metFORMIN (GLUCOPHAGE) 850 mg tablet, TAKE 1 TABLET BY MOUTH TWICE A DAY WITH BREAKFAST AND DINNER, Disp: 180 tablet, Rfl: 1  •  metoprolol succinate (TOPROL-XL) 25 mg 24 hr tablet, TAKE 1 TABLET (25 MG TOTAL) BY MOUTH DAILY  , Disp: 90 tablet, Rfl: 3  •  Multiple Vitamins-Minerals (CENTRUM SILVER 50+WOMEN PO), Take 1 tablet by mouth daily , Disp: , Rfl:   •  Potassium 99 MG TABS, Take 1 tablet by mouth daily, Disp: , Rfl:   •  ramipril (ALTACE) 5 mg capsule, Take 1 capsule (5 mg total) by mouth daily, Disp: 90 capsule, Rfl: 3  •  simvastatin (ZOCOR) 10 mg tablet, TAKE 1 TABLET BY MOUTH EVERY DAY, Disp: 90 tablet, Rfl: 1      Wt Readings from Last 3 Encounters:   23 105 kg (231 lb)   23 103 kg (227 lb 9 6 oz)   10/20/22 104 kg (229 lb 6 4 oz)     Temp Readings from Last 3 Encounters:   10/20/22 98 °F (36 7 °C)   06/23/22 97 6 °F (36 4 °C) (Temporal)   02/15/22 (!) 97 4 °F (36 3 °C)     BP Readings from Last 3 Encounters:   06/26/23 130/64   02/21/23 124/64   10/20/22 130/70     Pulse Readings from Last 3 Encounters:   06/26/23 79   02/21/23 61   10/20/22 80         Physical Exam  HENT:      Head: Atraumatic  Mouth/Throat:      Mouth: Mucous membranes are moist    Eyes:      Extraocular Movements: Extraocular movements intact  Cardiovascular:      Rate and Rhythm: Normal rate and regular rhythm  Pulses: Normal pulses  Pulmonary:      Effort: Pulmonary effort is normal    Abdominal:      General: Abdomen is flat  Palpations: Abdomen is soft  Musculoskeletal:         General: Normal range of motion  Cervical back: Normal range of motion  Skin:     General: Skin is warm  Neurological:      General: No focal deficit present  Mental Status: She is alert and oriented to person, place, and time     Psychiatric:         Mood and Affect: Mood normal          Behavior: Behavior normal            Laboratory Studies:  CMP:  Lab Results   Component Value Date     01/10/2018    K 4 0 08/12/2022     08/12/2022    CO2 31 08/12/2022    ANIONGAP 8 10/14/2015    BUN 19 08/12/2022    CREATININE 1 05 08/12/2022    GLUCOSE 172 (H) 10/14/2015    AST 16 08/12/2022    ALT 18 08/12/2022    BILITOT 0 54 10/14/2015    EGFR 50 08/12/2022       Lipid Profile:   Lab Results   Component Value Date    CHOL 153 07/10/2017     Lab Results   Component Value Date    HDL 60 08/12/2022     Lab Results   Component Value Date    LDLCALC 77 08/12/2022     Lab Results   Component Value Date    TRIG 114 08/12/2022       Cardiac testing:   EKG reviewed personally:   Results for orders placed during the hospital encounter of 10/24/19    Echo complete with contrast if indicated    Narrative  [facility], [address] [facility]  [phone]    Transthoracic Echocardiogram  Limited 2D, M-mode, Doppler, and Color Doppler    Study date:  24-Oct-2019    Patient: Luba Reyez  MR number: [id number]  Account number: [de-identified]  : 1940  Age: 70 years  Gender: Female  Status: Outpatient  Location: [facility]  Height: 64 in  Weight: 237 lb  BP: 122/ 78 mmHg    Indications: Murmur  Diagnoses: R01 1 - Cardiac murmur, unspecified    Sonographer:  FRANCA Magallanes  Primary Physician:  Carolyn Moran MD  Referring Physician:  Des Vera MD  Group[facility]  [facility]  Interpreting Physician:  Chance Cr DO    IMPRESSIONS:  Normal left ventricular size and systolic function, ejection fraction 55%  Abnormal septal motion consistent with left bundle branch block  Grade 1 diastolic dysfunction  Normal right ventricular size and systolic function  Normal aortic root  Moderate left atrial dilation  Normal right atrium  Mildly calcified and sclerotic aortic valve with mild insufficiency and no stenosis  No other significant valve abnormalities  Normal IVC size and inspiratory collapse  RA pressure estimated at 3 mmHg  Upper normal pulmonary pressure  PASP estimated at 36 mmHg  Compared to prior echocardiogram, the aortic valve is now mildly calcified and sclerotic with mild aortic insufficiency  SUMMARY    LEFT VENTRICLE:  Systolic function was normal by visual assessment  Ejection fraction was estimated to be 55 %  There were no regional wall motion abnormalities  Doppler parameters were consistent with abnormal left ventricular relaxation (grade 1 diastolic dysfunction)  VENTRICULAR SEPTUM:  There was mild paradoxical motion  These changes are consistent with LBBB  RIGHT VENTRICLE:  Systolic pressure was at the upper limits of normal  Estimated peak pressure was 36 mmHg  LEFT ATRIUM:  The atrium was moderately dilated      AORTIC VALVE:  Leaflets exhibited moderate calcification, lower normal cuspal separation, and sclerosis  There was mild regurgitation  Regurgitation grade was 1+ on a scale of 0 to 4+  COMPARISONS:  Compared to prior echocardiogram, the aortic valve is now mildly calcified and sclerotic with mild aortic insufficiency  HISTORY: PRIOR HISTORY: SVT  LBBB  Risk factors: diabetes, medication-treated hypercholesterolemia, and morbid obesity  PRIOR PROCEDURES: Arrhythmia ablation  PROCEDURE: The study was performed in the [address] [place] [address]  This was a routine study  The transthoracic approach was used  The study included limited 2D imaging, M-mode, complete spectral Doppler, and color Doppler  The  heart rate was 78 bpm, at the start of the study  Images were obtained from the parasternal, apical, subcostal, and suprasternal notch acoustic windows  Intravenous contrast ( 1 2 Definity in NSS  , 6 ml) was administered to opacify the  left ventricle  This was a technically difficult study  LEFT VENTRICLE: Size was normal  Systolic function was normal by visual assessment  Ejection fraction was estimated to be 55 %  There were no regional wall motion abnormalities  Wall thickness was normal  DOPPLER: Doppler parameters were  consistent with abnormal left ventricular relaxation (grade 1 diastolic dysfunction)  VENTRICULAR SEPTUM: There was mild paradoxical motion  These changes are consistent with LBBB  RIGHT VENTRICLE: The size was normal  Systolic function was normal  Wall thickness was normal  DOPPLER: Systolic pressure was at the upper limits of normal  Estimated peak pressure was 36 mmHg  LEFT ATRIUM: The atrium was moderately dilated  RIGHT ATRIUM: Size was normal     MITRAL VALVE: Valve structure was normal  There was normal leaflet separation  DOPPLER: The transmitral velocity was within the normal range  There was no evidence for stenosis  There was no regurgitation  AORTIC VALVE: Leaflets exhibited moderate calcification, lower normal cuspal separation, and sclerosis  DOPPLER: Transaortic velocity was minimally increased  There was no evidence for stenosis  There was mild regurgitation  Regurgitation  grade was 1+ on a scale of 0 to 4+  TRICUSPID VALVE: The valve structure was normal  There was normal leaflet separation  DOPPLER: The transtricuspid velocity was within the normal range  There was no evidence for stenosis  There was no regurgitation  PULMONIC VALVE: Leaflets exhibited normal thickness, no calcification, and normal cuspal separation  DOPPLER: The transpulmonic velocity was within the normal range  There was no regurgitation  PERICARDIUM: There was no pericardial effusion  The pericardium was normal in appearance  AORTA: The root exhibited normal size  SYSTEMIC VEINS: IVC: The inferior vena cava was normal in size and course  Respirophasic changes were normal  RA pressure estimated at 3 mmHg  SYSTEM MEASUREMENT TABLES    2D  %FS: 24 97 %  Ao Diam: 3 02 cm  EDV(Teich): 72 41 ml  EF(Teich): 49 89 %  ESV(Teich): 36 28 ml  IVSd: 1 01 cm  LA Diam: 3 31 cm  LVIDd: 4 06 cm  LVIDs: 3 04 cm  LVOT Diam: 1 83 cm  LVPWd: 0 99 cm  RWT: 0 49  SV(Teich): 36 13 ml    2D mode  LAAs: 20 5 cm2  RAAs: 11 4 cm2    CW  AR Dec Hendry: 2 55 m/s2  AR Dec Time: 1422 06 ms  AR PHT: 412 4 ms  AR Vmax: 3 63 m/s  AR maxP 69 mmHg  AV Env  Ti: 342 53 ms  AV MaxP 45 mmHg  AV VTI: 44 79 cm  AV Vmax: 1 83 m/s  AV Vmean: 1 31 m/s  AV meanP 52 mmHg  TR MaxP 55 mmHg  TR Vmax: 2 85 m/s    MM  TAPSE: 2 45 cm    PW  DEMETRIA (VTI): 1 8 cm2  DEMETRIA Vmax: 1 89 cm2  E': 0 06 m/s  E/E': 12 59  LVOT Env  Ti: 338 73 ms  LVOT VTI: 30 58 cm  LVOT Vmax: 1 31 m/s  LVOT Vmean: 0 9 m/s  LVOT maxP 89 mmHg  LVOT meanPG: 3 84 mmHg  LVSV Dopp: 80 72 ml  MV A Jude: 1 12 m/s  MV Dec Hendry: 4 68 m/s2  MV DecT: 170 34 ms  MV E Jude: 0 8 m/s  MV E/A Ratio: 0 71  MV PHT: 49 4 ms  MVA By PHT: 4 45 cm2    [address] Accredited Echocardi[facility]    Prepared and electronically signed by    Lin Mortimer, DO  Signed 24-Oct-2019 14:28:31    No results found for this or any previous visit  No results found for this or any previous visit  No results found for this or any previous visit

## 2023-06-29 ENCOUNTER — OFFICE VISIT (OUTPATIENT)
Dept: SURGICAL ONCOLOGY | Facility: CLINIC | Age: 83
End: 2023-06-29
Payer: MEDICARE

## 2023-06-29 VITALS
DIASTOLIC BLOOD PRESSURE: 84 MMHG | SYSTOLIC BLOOD PRESSURE: 126 MMHG | TEMPERATURE: 97.1 F | OXYGEN SATURATION: 98 % | HEART RATE: 98 BPM | WEIGHT: 231 LBS | HEIGHT: 65 IN | BODY MASS INDEX: 38.49 KG/M2 | RESPIRATION RATE: 21 BRPM

## 2023-06-29 DIAGNOSIS — E04.2 MULTIPLE THYROID NODULES: Primary | ICD-10-CM

## 2023-06-29 PROCEDURE — 99213 OFFICE O/P EST LOW 20 MIN: CPT | Performed by: SURGERY

## 2023-06-29 NOTE — LETTER
2023     Carolyn Moran MD  [address] [place] [address]  [address] [place] [address] [address]    Patient: Sekou Piedra   YOB: 1940   Date of Visit: 2023       Dear Dr Hermon Burkitt: Thank you for referring Sekou Piedra to me for evaluation  Below are my notes for this consultation  If you have questions, please do not hesitate to call me  I look forward to following your patient along with you  Sincerely,        Tavares Rush MD        CC: No Recipients    Tavares Rush MD  2023 10:56 AM  Incomplete               Surgical Oncology Follow Up       [facility] [address] [facility] [address]  [place] [id number] [place]  [phone]    [place]  1940  [id number]  [facility]  [facility] [address]  [address] [place] [address]  [phone]    Diagnoses and all orders for this visit:    Multiple thyroid nodules  -     US thyroid; Future        Chief Complaint   Patient presents with   • Follow-up       Return in about 1 year (around 2024) for with Padma  Oncology History    No history exists  Stagin 9cm left midgland and 2 8cm right midgland nodules biopsied 2021, both [place] II (benign)  Treatment history:  Left thyroid lobectomy, 2021  Current treatment:  Right lobe observation  Disease status: KERI    History of Present Illness: Patient returns in follow-up of her thyroid nodules  She is doing well at this time with no complaints  No dysphagia or hoarseness  Thyroid ultrasound from 2023 reveals a right mid gland nodule measures 3 4 x 1 7 x 2 7 cm in size  This has slightly increased in size from 3 1 x 1 6 x 2 5 cm  This nodule was previously biopsied and was benign  There is another right thyroid nodule that measures 1 9 x 1 7 x 1 9 cm  Previously this was 1 8 x 1 5 x 1 5 cm  This was TR 3  There is a third 2 2 x 1 3 x 2 cm nodule that was grossly unchanged  The previously biopsied nodule, was the only nodule that met the criteria for biopsy  I personally reviewed her films  Review of Systems  Complete ROS Surg Onc:   Complete ROS Surg Onc:   Constitutional: The patient denies new or recent history of general fatigue, no recent weight loss, no change in appetite  Eyes: No complaints of visual problems, no scleral icterus  ENT: no complaints of ear pain, no hoarseness, no difficulty swallowing,  no tinnitus and no new masses in head, oral cavity, or neck  Cardiovascular: No complaints of chest pain, no palpitations, no ankle edema  Respiratory: No complaints of shortness of breath, no cough  Gastrointestinal: No complaints of jaundice, no bloody stools, no pale stools  Genitourinary: No complaints of dysuria, no hematuria, no nocturia, no frequent urination, no urethral discharge  Musculoskeletal: No complaints of weakness, paralysis, joint stiffness or arthralgias  Integumentary: No complaints of rash, no new lesions  Neurological: No complaints of convulsions, no seizures, no dizziness  Hematologic/Lymphatic: No complaints of easy bruising  Endocrine:  No hot or cold intolerance  No polydipsia, polyphagia, or polyuria  Allergy/immunology:  No environmental allergies  No food allergies  Not immunocompromised  Skin:  No pallor or rash  No wound          Patient Active Problem List   Diagnosis   • SVT (supraventricular tachycardia) (HCC)   • Essential hypertension   • Dyslipidemia   • LBBB (left bundle branch block)   • 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)   • Morbid obesity with BMI of 40 0-44 9, [facility])   • Acquired hypothyroidism   • Stage 3 chronic kidney disease (HCC)   • Multiple thyroid nodules   • S/P partial thyroidectomy     Past Medical History:   Diagnosis Date   • Chronic kidney disease     Stage III   • Diabetes mellitus ([facility] [address] )    • Disease of thyroid gland    • Hyperlipidemia    • Hypertension    • Irregular heart beat    • LBBB (left bundle branch block)    • Palpitations    • SVT (supraventricular tachycardia) ([facility] [address] ) 2019    Resolved Post Ablation     Past Surgical History:   Procedure Laterality Date   • APPENDECTOMY     • CARDIAC ELECTROPHYSIOLOGY MAPPING AND ABLATION  2019   • CARPAL TUNNEL RELEASE Right    •  SECTION     • HERNIA REPAIR     • JOINT REPLACEMENT Right     Knee   • JOINT REPLACEMENT Right     Hip   • KNEE ARTHROSCOPY Right     therapeutic    • KNEE SURGERY Left     Cartilage repair   • THYROID LOBECTOMY Left 2021    Procedure: HEMITHYROIDECTOMY;  Surgeon: Freddie Galeano MD;  [facility];  Service: Surgical Oncology   • TONSILLECTOMY     • US GUIDED THYROID BIOPSY  2021     Family History   Problem Relation Age of Onset   • Arthritis Mother    • Other Mother         CABG   • Cancer Mother    • Heart disease Mother    • Osteoporosis Mother    • Lung cancer Father    • Pancreatic cancer Father      Social History     Socioeconomic History   • Marital status: /Civil Union     Spouse name: Not on file   • Number of children: Not on file   • Years of education: Not on file   • Highest education level: Not on file   Occupational History   • Not on file   Tobacco Use   • Smoking status: Former     Packs/day: 0 00     Years: 0 00     Total pack years: 0 00     Types: Cigarettes     Quit date: 12     Years since quittin 5   • Smokeless tobacco: Never   Vaping Use   • Vaping Use: Never used   Substance and Sexual Activity   • Alcohol use: Not Currently     Alcohol/week: 0 0 standard drinks of alcohol   • Drug use: Never   • Sexual activity: Not on file   Other Topics Concern   • Not on file   Social History Narrative    Caffeine use      Social Determinants of Health     Financial Resource Strain: Not on file   Food Insecurity: Not on file   Transportation Needs: Not on file   Physical Activity: Not on file   Stress: Not on file   Social Connections: Not on file   Intimate Partner Violence: Not on file   Housing Stability: Not on file       Current Outpatient Medications:   •  Aspirin 81 MG EC tablet, Take 1 tablet by mouth daily, Disp: , Rfl:   •  hydrochlorothiazide (HYDRODIURIL) 25 mg tablet, TAKE 1 TABLET BY MOUTH EVERY DAY, Disp: 90 tablet, Rfl: 1  •  levothyroxine 50 mcg tablet, TAKE 1 TABLET BY MOUTH EVERY DAY, Disp: 90 tablet, Rfl: 1  •  magnesium Oxide (MAG-OX) 400 mg TABS, Take 1 tablet by mouth daily, Disp: , Rfl:   •  metFORMIN (GLUCOPHAGE) 850 mg tablet, TAKE 1 TABLET BY MOUTH TWICE A DAY WITH BREAKFAST AND DINNER, Disp: 180 tablet, Rfl: 1  •  metoprolol succinate (TOPROL-XL) 25 mg 24 hr tablet, TAKE 1 TABLET (25 MG TOTAL) BY MOUTH DAILY  , Disp: 90 tablet, Rfl: 3  •  Multiple Vitamins-Minerals (CENTRUM SILVER 50+WOMEN PO), Take 1 tablet by mouth daily , Disp: , Rfl:   •  Potassium 99 MG TABS, Take 1 tablet by mouth daily, Disp: , Rfl:   •  ramipril (ALTACE) 5 mg capsule, Take 1 capsule (5 mg total) by mouth daily, Disp: 90 capsule, Rfl: 3  •  simvastatin (ZOCOR) 10 mg tablet, TAKE 1 TABLET BY MOUTH EVERY DAY, Disp: 90 tablet, Rfl: 1  No Known Allergies  Vitals:    06/29/23 1016   BP: 126/84   Pulse: 98   Resp: 21   Temp: (!) 97 1 °F (36 2 °C)   SpO2: 98%       Physical Exam  Constitutional: General appearance: The Patient is well-developed and well-nourished who appears the stated age in no acute distress  Patient is pleasant and talkative  HEENT:  Normocephalic  Sclerae are anicteric  Mucous membranes are moist  Neck is supple without adenopathy  No JVD  Palpable right thyroid nodules  These are not fixed to any underlying structures  Extremities: There is no clubbing or cyanosis  There is no edema  Symmetric  Neuro: Grossly nonfocal  Gait is normal      Lymphatic: No evidence of cervical adenopathy bilaterally  Skin: Warm, anicteric  Psych:  Patient is pleasant and talkative  Breasts:        Pathology:  [unfilled]    Labs:      Imaging  US thyroid    Result Date: 6/27/2023  Narrative: THYROID ULTRASOUND INDICATION:  E04 2: Nontoxic multinodular goiter  COMPARISON: Thyroid ultrasound 6/20/2022, images from thyroid biopsy 4/22/2021 TECHNIQUE:   Ultrasound of the thyroid was performed with a high frequency linear transducer in transverse and sagittal planes including volumetric imaging sweeps as well as traditional still imaging technique  FINDINGS: Right lobe is heterogeneous in echotexture  Right lobe: 6 4 x 3 1 x 3 2 cm  Volume 30 5 mL Left lobe: Surgically absent  Isthmus: 0 3  cm  Nodule #1  Image 15 RIGHT midgland nodule measuring 3 4 x 1 7 x 2 7 cm, previously 3 1 x 1 6 x 2 5 cm  COMPOSITION:  2 points, solid or almost completely solid  ECHOGENICITY:  1 point, hyperechoic or isoechoic  SHAPE:  0 points, wider-than-tall  MARGIN: 0 points, smooth  ECHOGENIC FOCI:  0 points, none or large comet-tail artifacts  TI-RADS Classification: TR 3 (3 points)  This nodule was biopsied in April 2021 with benign result  Nodule #2  Image 19 RIGHT lower pole posterior nodule measuring 1 9 x 1 7 x 1 9 cm, previously 1 8 x 1 5 x 1 5 cm  COMPOSITION:  2 points, solid or almost completely solid  ECHOGENICITY:  1 point, hyperechoic or isoechoic  SHAPE:  0 points, wider-than-tall  MARGIN: 0 points, ill-defined  ECHOGENIC FOCI:  0 points, none or large comet-tail artifacts  TI-RADS Classification: TR 3 (3 points), FNA if >2 5 cm  Follow if >1 5 cm  Nodule #3 RIGHT lower pole anterior nodule measuring 2 2 x 1 3 x 2 0 cm on the previous study was not formally measured by the technologist on static images during the study  Grossly unchanged based on limited cine imaging  COMPOSITION:  2 points, solid or almost completely solid  ECHOGENICITY:  1 point, hyperechoic or isoechoic  SHAPE:  0 points, wider-than-tall   MARGIN: 0 points, ill-defined  ECHOGENIC FOCI:  0 points, none or large comet-tail artifacts  TI-RADS Classification: TR 3 (3 points), FNA if >2 5 cm  Follow if >1 5 cm  There are additional right-sided nodules of lesser size and/or TI-RADS score  At the time of follow-up for the above described dominant nodules, these will be reevaluated in detail at that time if needed  The left thyroid bed is grossly unremarkable  Impression: Stable to minimally larger right thyroid nodules as above  Continued ultrasound follow-up in 12 months recommended  Reference: ACR Thyroid Imaging, Reporting and Data System (TI-RADS): White Paper of the [employer]  J AM Sophia Radiol 7788;26:043-160  (additional recommendations based on American Thyroid Association 2015 guidelines ) Workstation performed: [id number]     I reviewed the above laboratory and imaging data  Discussion/Summary: 82year-old female status post left thyroid lobectomy for a multinodular goiter  This was benign  She still has nodules on the right that will need to be followed  The largest nodule continues to slowly increase in size, but was negative on previous biopsy  I discussed the 4% risk of a falsely negative biopsy  She is asymptomatic  I am comfortable with observation  She wishes to have minimal interventions if possible  I would recommend surgical intervention if this increases in size to more than 4 cm  Consequently, I will obtain a thyroid ultrasound in 1 year and see her again at that time for another clinical exam   She should continue to follow-up with TSH and T4 levels with her primary care physician  All of her questions were answered

## 2023-06-29 NOTE — PROGRESS NOTES
Surgical Oncology Follow Up       [facility]  [facility] 707 [facility]  600 [facility] 203  [address] [place] [id number] [phone]    [place]  1940  [id number]  [facility]  [facility] [place]  600 [facility] 203  25 [facility]  [phone]    Diagnoses and all orders for this visit:    Multiple thyroid nodules  -     US thyroid; Future        Chief Complaint   Patient presents with   • Follow-up       Return in about 1 year (around 2024) for with Padma  Oncology History    No history exists  Stagin 9cm left midgland and 2 8cm right midgland nodules biopsied 2021, both [place] II (benign)  Treatment history:  Left thyroid lobectomy, 2021  Current treatment:  Right lobe observation  Disease status: KERI    History of Present Illness: Patient returns in follow-up of her thyroid nodules  She is doing well at this time with no complaints  No dysphagia or hoarseness  Thyroid ultrasound from 2023 reveals a right mid gland nodule measures 3 4 x 1 7 x 2 7 cm in size  This has slightly increased in size from 3 1 x 1 6 x 2 5 cm  This nodule was previously biopsied and was benign  There is another right thyroid nodule that measures 1 9 x 1 7 x 1 9 cm  Previously this was 1 8 x 1 5 x 1 5 cm  This was TR 3  There is a third 2 2 x 1 3 x 2 cm nodule that was grossly unchanged  The previously biopsied nodule, was the only nodule that met the criteria for biopsy  I personally reviewed her films  Review of Systems  Complete ROS Surg Onc:   Complete ROS Surg Onc:   Constitutional: The patient denies new or recent history of general fatigue, no recent weight loss, no change in appetite  Eyes: No complaints of visual problems, no scleral icterus     ENT: no complaints of ear pain, no hoarseness, no difficulty swallowing,  no tinnitus and no new masses in head, oral cavity, or neck  Cardiovascular: No complaints of chest pain, no palpitations, no ankle edema  Respiratory: No complaints of shortness of breath, no cough  Gastrointestinal: No complaints of jaundice, no bloody stools, no pale stools  Genitourinary: No complaints of dysuria, no hematuria, no nocturia, no frequent urination, no urethral discharge  Musculoskeletal: No complaints of weakness, paralysis, joint stiffness or arthralgias  Integumentary: No complaints of rash, no new lesions  Neurological: No complaints of convulsions, no seizures, no dizziness  Hematologic/Lymphatic: No complaints of easy bruising  Endocrine:  No hot or cold intolerance  No polydipsia, polyphagia, or polyuria  Allergy/immunology:  No environmental allergies  No food allergies  Not immunocompromised  Skin:  No pallor or rash  No wound          Patient Active Problem List   Diagnosis   • SVT (supraventricular tachycardia) (HCC)   • Essential hypertension   • Dyslipidemia   • LBBB (left bundle branch block)   • 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)   • Morbid obesity with BMI of 40 0-44 9, adult ([facility] [address] )   • Acquired hypothyroidism   • Stage 3 chronic kidney disease (HCC)   • Multiple thyroid nodules   • S/P partial thyroidectomy     Past Medical History:   Diagnosis Date   • Chronic kidney disease     Stage III   • Diabetes mellitus ([facility] [address] )    • Disease of thyroid gland    • Hyperlipidemia    • Hypertension    • Irregular heart beat    • LBBB (left bundle branch block)    • Palpitations    • SVT (supraventricular tachycardia) ([facility] [address] ) 2019    Resolved Post Ablation     Past Surgical History:   Procedure Laterality Date   • APPENDECTOMY     • CARDIAC ELECTROPHYSIOLOGY MAPPING AND ABLATION  2019   • CARPAL TUNNEL RELEASE Right    •  SECTION     • HERNIA REPAIR     • JOINT REPLACEMENT Right     Knee • JOINT REPLACEMENT Right     Hip   • KNEE ARTHROSCOPY Right     therapeutic    • KNEE SURGERY Left     Cartilage repair   • THYROID LOBECTOMY Left 2021    Procedure: HEMITHYROIDECTOMY;  Surgeon: Azam Lynn MD;  [facility];  Service: Surgical Oncology   • TONSILLECTOMY     • US GUIDED THYROID BIOPSY  2021     Family History   Problem Relation Age of Onset   • Arthritis Mother    • Other Mother         CABG   • Cancer Mother    • Heart disease Mother    • Osteoporosis Mother    • Lung cancer Father    • Pancreatic cancer Father      Social History     Socioeconomic History   • Marital status: /Civil Union     Spouse name: Not on file   • Number of children: Not on file   • Years of education: Not on file   • Highest education level: Not on file   Occupational History   • Not on file   Tobacco Use   • Smoking status: Former     Packs/day: 0 00     Years: 0 00     Total pack years: 0 00     Types: Cigarettes     Quit date: 12     Years since quittin 5   • Smokeless tobacco: Never   Vaping Use   • Vaping Use: Never used   Substance and Sexual Activity   • Alcohol use: Not Currently     Alcohol/week: 0 0 standard drinks of alcohol   • Drug use: Never   • Sexual activity: Not on file   Other Topics Concern   • Not on file   Social History Narrative    Caffeine use      Social Determinants of Health     Financial Resource Strain: Not on file   Food Insecurity: Not on file   Transportation Needs: Not on file   Physical Activity: Not on file   Stress: Not on file   Social Connections: Not on file   Intimate Partner Violence: Not on file   Housing Stability: Not on file       Current Outpatient Medications:   •  Aspirin 81 MG EC tablet, Take 1 tablet by mouth daily, Disp: , Rfl:   •  hydrochlorothiazide (HYDRODIURIL) 25 mg tablet, TAKE 1 TABLET BY MOUTH EVERY DAY, Disp: 90 tablet, Rfl: 1  •  levothyroxine 50 mcg tablet, TAKE 1 TABLET BY MOUTH EVERY DAY, Disp: 90 tablet, Rfl: 1  • magnesium Oxide (MAG-OX) 400 mg TABS, Take 1 tablet by mouth daily, Disp: , Rfl:   •  metFORMIN (GLUCOPHAGE) 850 mg tablet, TAKE 1 TABLET BY MOUTH TWICE A DAY WITH BREAKFAST AND DINNER, Disp: 180 tablet, Rfl: 1  •  metoprolol succinate (TOPROL-XL) 25 mg 24 hr tablet, TAKE 1 TABLET (25 MG TOTAL) BY MOUTH DAILY  , Disp: 90 tablet, Rfl: 3  •  Multiple Vitamins-Minerals (CENTRUM SILVER 50+WOMEN PO), Take 1 tablet by mouth daily , Disp: , Rfl:   •  Potassium 99 MG TABS, Take 1 tablet by mouth daily, Disp: , Rfl:   •  ramipril (ALTACE) 5 mg capsule, Take 1 capsule (5 mg total) by mouth daily, Disp: 90 capsule, Rfl: 3  •  simvastatin (ZOCOR) 10 mg tablet, TAKE 1 TABLET BY MOUTH EVERY DAY, Disp: 90 tablet, Rfl: 1  No Known Allergies  Vitals:    06/29/23 1016   BP: 126/84   Pulse: 98   Resp: 21   Temp: (!) 97 1 °F (36 2 °C)   SpO2: 98%       Physical Exam  Constitutional: General appearance: The Patient is well-developed and well-nourished who appears the stated age in no acute distress  Patient is pleasant and talkative  HEENT:  Normocephalic  Sclerae are anicteric  Mucous membranes are moist  Neck is supple without adenopathy  No JVD  Palpable right thyroid nodules  These are not fixed to any underlying structures  Extremities: There is no clubbing or cyanosis  There is no edema  Symmetric  Neuro: Grossly nonfocal  Gait is normal      Lymphatic: No evidence of cervical adenopathy bilaterally  Skin: Warm, anicteric  Psych:  Patient is pleasant and talkative  Breasts:        Pathology:  [unfilled]    Labs:      Imaging  US thyroid    Result Date: 6/27/2023  Narrative: THYROID ULTRASOUND INDICATION:  E04 2: Nontoxic multinodular goiter   COMPARISON: Thyroid ultrasound 6/20/2022, images from thyroid biopsy 4/22/2021 TECHNIQUE:   Ultrasound of the thyroid was performed with a high frequency linear transducer in transverse and sagittal planes including volumetric imaging sweeps as well as traditional still imaging technique  FINDINGS: Right lobe is heterogeneous in echotexture  Right lobe: 6 4 x 3 1 x 3 2 cm  Volume 30 5 mL Left lobe: Surgically absent  Isthmus: 0 3  cm  Nodule #1  Image 15 RIGHT midgland nodule measuring 3 4 x 1 7 x 2 7 cm, previously 3 1 x 1 6 x 2 5 cm  COMPOSITION:  2 points, solid or almost completely solid  ECHOGENICITY:  1 point, hyperechoic or isoechoic  SHAPE:  0 points, wider-than-tall  MARGIN: 0 points, smooth  ECHOGENIC FOCI:  0 points, none or large comet-tail artifacts  TI-RADS Classification: TR 3 (3 points)  This nodule was biopsied in April 2021 with benign result  Nodule #2  Image 19 RIGHT lower pole posterior nodule measuring 1 9 x 1 7 x 1 9 cm, previously 1 8 x 1 5 x 1 5 cm  COMPOSITION:  2 points, solid or almost completely solid  ECHOGENICITY:  1 point, hyperechoic or isoechoic  SHAPE:  0 points, wider-than-tall  MARGIN: 0 points, ill-defined  ECHOGENIC FOCI:  0 points, none or large comet-tail artifacts  TI-RADS Classification: TR 3 (3 points), FNA if >2 5 cm  Follow if >1 5 cm  Nodule #3 RIGHT lower pole anterior nodule measuring 2 2 x 1 3 x 2 0 cm on the previous study was not formally measured by the technologist on static images during the study  Grossly unchanged based on limited cine imaging  COMPOSITION:  2 points, solid or almost completely solid  ECHOGENICITY:  1 point, hyperechoic or isoechoic  SHAPE:  0 points, wider-than-tall  MARGIN: 0 points, ill-defined  ECHOGENIC FOCI:  0 points, none or large comet-tail artifacts  TI-RADS Classification: TR 3 (3 points), FNA if >2 5 cm  Follow if >1 5 cm  There are additional right-sided nodules of lesser size and/or TI-RADS score  At the time of follow-up for the above described dominant nodules, these will be reevaluated in detail at that time if needed  The left thyroid bed is grossly unremarkable  Impression: Stable to minimally larger right thyroid nodules as above   Continued ultrasound follow-up in 12 months recommended  Reference: ACR Thyroid Imaging, Reporting and Data System (TI-RADS): White Paper of the [employer]  J AM Sophia Radiol 7412;19:703-033  (additional recommendations based on American Thyroid Association 2015 guidelines ) Workstation performed: [id number]     I reviewed the above laboratory and imaging data  Discussion/Summary: 82year-old female status post left thyroid lobectomy for a multinodular goiter  This was benign  She still has nodules on the right that will need to be followed  The largest nodule continues to slowly increase in size, but was negative on previous biopsy  I discussed the 4% risk of a falsely negative biopsy  She is asymptomatic  I am comfortable with observation  She wishes to have minimal interventions if possible  I would recommend surgical intervention if this increases in size to more than 4 cm  Consequently, I will obtain a thyroid ultrasound in 1 year and see her again at that time for another clinical exam   She should continue to follow-up with TSH and T4 levels with her primary care physician  All of her questions were answered

## 2023-07-06 DIAGNOSIS — E03.9 HYPOTHYROIDISM, UNSPECIFIED TYPE: ICD-10-CM

## 2023-07-06 RX ORDER — LEVOTHYROXINE SODIUM 0.05 MG/1
TABLET ORAL
Qty: 90 TABLET | Refills: 1 | Status: SHIPPED | OUTPATIENT
Start: 2023-07-06

## 2023-08-14 ENCOUNTER — APPOINTMENT (OUTPATIENT)
Dept: LAB | Facility: HOSPITAL | Age: 83
End: 2023-08-14
Payer: MEDICARE

## 2023-08-14 DIAGNOSIS — E11.3293 TYPE 2 DIABETES MELLITUS WITH BOTH EYES AFFECTED BY MILD NONPROLIFERATIVE RETINOPATHY WITHOUT MACULAR EDEMA, WITHOUT LONG-TERM CURRENT USE OF INSULIN (HCC): ICD-10-CM

## 2023-08-14 DIAGNOSIS — Z13.0 SCREENING FOR IRON DEFICIENCY ANEMIA: ICD-10-CM

## 2023-08-14 DIAGNOSIS — E78.5 DYSLIPIDEMIA: ICD-10-CM

## 2023-08-14 DIAGNOSIS — N18.31 STAGE 3A CHRONIC KIDNEY DISEASE (HCC): ICD-10-CM

## 2023-08-14 DIAGNOSIS — E03.9 ACQUIRED HYPOTHYROIDISM: ICD-10-CM

## 2023-08-14 LAB
ALBUMIN SERPL BCP-MCNC: 4.4 G/DL (ref 3.5–5)
ALP SERPL-CCNC: 57 U/L (ref 34–104)
ALT SERPL W P-5'-P-CCNC: 12 U/L (ref 7–52)
ANION GAP SERPL CALCULATED.3IONS-SCNC: 6 MMOL/L
AST SERPL W P-5'-P-CCNC: 16 U/L (ref 13–39)
BASOPHILS # BLD AUTO: 0.05 THOUSANDS/ÂΜL (ref 0–0.1)
BASOPHILS NFR BLD AUTO: 1 % (ref 0–1)
BILIRUB SERPL-MCNC: 0.68 MG/DL (ref 0.2–1)
BUN SERPL-MCNC: 20 MG/DL (ref 5–25)
CALCIUM SERPL-MCNC: 9.9 MG/DL (ref 8.4–10.2)
CHLORIDE SERPL-SCNC: 100 MMOL/L (ref 96–108)
CHOLEST SERPL-MCNC: 155 MG/DL
CO2 SERPL-SCNC: 33 MMOL/L (ref 21–32)
CREAT SERPL-MCNC: 0.97 MG/DL (ref 0.6–1.3)
CREAT UR-MCNC: 68.1 MG/DL
EOSINOPHIL # BLD AUTO: 0.16 THOUSAND/ÂΜL (ref 0–0.61)
EOSINOPHIL NFR BLD AUTO: 3 % (ref 0–6)
ERYTHROCYTE [DISTWIDTH] IN BLOOD BY AUTOMATED COUNT: 13 % (ref 11.6–15.1)
EST. AVERAGE GLUCOSE BLD GHB EST-MCNC: 151 MG/DL
GFR SERPL CREATININE-BSD FRML MDRD: 54 ML/MIN/1.73SQ M
GLUCOSE P FAST SERPL-MCNC: 142 MG/DL (ref 65–99)
HBA1C MFR BLD: 6.9 %
HCT VFR BLD AUTO: 41.1 % (ref 34.8–46.1)
HDLC SERPL-MCNC: 55 MG/DL
HGB BLD-MCNC: 13.3 G/DL (ref 11.5–15.4)
IMM GRANULOCYTES # BLD AUTO: 0.02 THOUSAND/UL (ref 0–0.2)
IMM GRANULOCYTES NFR BLD AUTO: 0 % (ref 0–2)
LDLC SERPL CALC-MCNC: 66 MG/DL (ref 0–100)
LYMPHOCYTES # BLD AUTO: 1.78 THOUSANDS/ÂΜL (ref 0.6–4.47)
LYMPHOCYTES NFR BLD AUTO: 27 % (ref 14–44)
MCH RBC QN AUTO: 29.3 PG (ref 26.8–34.3)
MCHC RBC AUTO-ENTMCNC: 32.4 G/DL (ref 31.4–37.4)
MCV RBC AUTO: 91 FL (ref 82–98)
MICROALBUMIN UR-MCNC: 5.5 MG/L (ref 0–20)
MICROALBUMIN/CREAT 24H UR: 8 MG/G CREATININE (ref 0–30)
MONOCYTES # BLD AUTO: 0.45 THOUSAND/ÂΜL (ref 0.17–1.22)
MONOCYTES NFR BLD AUTO: 7 % (ref 4–12)
NEUTROPHILS # BLD AUTO: 4.05 THOUSANDS/ÂΜL (ref 1.85–7.62)
NEUTS SEG NFR BLD AUTO: 62 % (ref 43–75)
NRBC BLD AUTO-RTO: 0 /100 WBCS
PLATELET # BLD AUTO: 227 THOUSANDS/UL (ref 149–390)
PMV BLD AUTO: 10 FL (ref 8.9–12.7)
POTASSIUM SERPL-SCNC: 3.9 MMOL/L (ref 3.5–5.3)
PROT SERPL-MCNC: 7.2 G/DL (ref 6.4–8.4)
RBC # BLD AUTO: 4.54 MILLION/UL (ref 3.81–5.12)
SODIUM SERPL-SCNC: 139 MMOL/L (ref 135–147)
TRIGL SERPL-MCNC: 168 MG/DL
TSH SERPL DL<=0.05 MIU/L-ACNC: 1.6 UIU/ML (ref 0.45–4.5)
WBC # BLD AUTO: 6.51 THOUSAND/UL (ref 4.31–10.16)

## 2023-08-14 PROCEDURE — 85025 COMPLETE CBC W/AUTO DIFF WBC: CPT

## 2023-08-14 PROCEDURE — 82043 UR ALBUMIN QUANTITATIVE: CPT

## 2023-08-14 PROCEDURE — 80053 COMPREHEN METABOLIC PANEL: CPT

## 2023-08-14 PROCEDURE — 80061 LIPID PANEL: CPT

## 2023-08-14 PROCEDURE — 82570 ASSAY OF URINE CREATININE: CPT

## 2023-08-14 PROCEDURE — 84443 ASSAY THYROID STIM HORMONE: CPT

## 2023-08-14 PROCEDURE — 36415 COLL VENOUS BLD VENIPUNCTURE: CPT

## 2023-08-14 PROCEDURE — 83036 HEMOGLOBIN GLYCOSYLATED A1C: CPT

## 2023-08-22 ENCOUNTER — RA CDI HCC (OUTPATIENT)
Dept: OTHER | Facility: HOSPITAL | Age: 83
End: 2023-08-22

## 2023-08-22 NOTE — PROGRESS NOTES
[address] [facility] coding opportunities     E11.22 and E11.36     Chart Reviewed number of suggestions sent to Provider: 2     Patients Insurance     Medicare Insurance: Medicare

## 2023-08-25 NOTE — PROGRESS NOTES
Assessment and Plan:     Problem List Items Addressed This Visit        Endocrine    Acquired hypothyroidism     Thyroid is under good control with levothyroxine 50 mcg daily. She will continue taking that. Cardiovascular and Mediastinum    SVT (supraventricular tachycardia) (HCC)     She is taking metoprolol and has been effective at preventing symptoms. She will continue following up with cardiology. Essential hypertension     Blood pressure today is 130/64's. She is taking Altace, hydrochlorothiazide and metoprolol. Metoprolol has a dual purpose, she is taking metoprolol also for SVT. Continue those medications, try to lose some weight, low-salt diet and exercise is much as she can tolerate. Genitourinary    Stage 3 chronic kidney disease [facility])     Lab Results   Component Value Date    EGFR 54 08/14/2023    EGFR 50 08/12/2022    EGFR 61 08/11/2021    CREATININE 0.97 08/14/2023    CREATININE 1.05 08/12/2022    CREATININE 0.90 08/11/2021   This is more or less stable. Once again I have emphasized the need to stay well-hydrated and avoid NSAIDs. Also keep blood pressure and diabetes under good control. Other    Dyslipidemia     Cholesterol is at goal taking simvastatin. Continue simvastatin, continue attempts at losing weight, low-fat diet. Morbid obesity with BMI of 40.0-44.9, adult ([facility])     BMI Counseling: Body mass index is 39.21 kg/m². The BMI is above normal. Nutrition recommendations include reducing portion sizes, decreasing overall calorie intake and consuming healthier snacks. Exercise recommendations include exercising 3-5 times per week. Other Visit Diagnoses     Preventative health care    -  Primary           Preventive health issues were discussed with patient, and age appropriate screening tests were ordered as noted in patient's After Visit Summary.   Personalized health advice and appropriate referrals for health education or preventive services given if needed, as noted in patient's After Visit Summary. History of Present Illness:     Patient presents for a Medicare Wellness Visit    This is a delightful 82-year-old woman. She lives with her . They have 1 daughter and she lives next door. 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 Patient Care Team:  Felicity Taylor MD as PCP - MD Cornelius Solis MD Shera Dais, MD (Surgical Oncology)     Review of Systems:     Review of Systems   Constitutional: Negative for chills and fever. HENT: Negative for ear pain, postnasal drip, rhinorrhea, sore throat and trouble swallowing. Eyes: Negative for pain, redness and visual disturbance. Respiratory: Negative for cough and shortness of breath. Cardiovascular: Negative for chest pain and palpitations. Gastrointestinal: Negative for abdominal pain, constipation, diarrhea and vomiting. Genitourinary: Negative for dysuria, frequency, hematuria and urgency. Musculoskeletal: Negative for arthralgias, back pain, joint swelling and myalgias. Skin: Negative for color change and rash. Neurological: Negative for seizures and syncope. Hematological: Negative for adenopathy. Psychiatric/Behavioral: Negative for decreased concentration, dysphoric mood and sleep disturbance. The patient is not nervous/anxious. All other systems reviewed and are negative.        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)   • Morbid obesity with BMI of 40.0-44.9, adult ([address])   • Acquired hypothyroidism   • Stage 3 chronic kidney disease (HCC)   • Multiple thyroid nodules   • S/P partial thyroidectomy      Past Medical and Surgical History:     Past Medical History: Diagnosis Date   • Chronic kidney disease     Stage III   • Diabetes mellitus ([address])    • Disease of thyroid gland    • Hyperlipidemia    • Hypertension    • Irregular heart beat    • LBBB (left bundle branch block)    • Palpitations    • SVT (supraventricular tachycardia) ([address]) 2019    Resolved Post Ablation     Past Surgical History:   Procedure Laterality Date   • APPENDECTOMY     • CARDIAC ELECTROPHYSIOLOGY MAPPING AND ABLATION  2019   • CARPAL TUNNEL RELEASE Right    •  SECTION     • HERNIA REPAIR     • JOINT REPLACEMENT Right     Knee   • JOINT REPLACEMENT Right     Hip   • KNEE ARTHROSCOPY Right     therapeutic    • KNEE SURGERY Left     Cartilage repair   • THYROID LOBECTOMY Left 2021    Procedure: HEMITHYROIDECTOMY;  Surgeon: Casey Kearney MD;  [facility];  Service: Surgical Oncology   • TONSILLECTOMY     • US GUIDED THYROID BIOPSY  2021      Family History:     Family History   Problem Relation Age of Onset   • Arthritis Mother    • Other Mother         CABG   • Cancer Mother    • Heart disease Mother    • Osteoporosis Mother    • Lung cancer Father    • Pancreatic cancer Father       Social History:     Social History     Socioeconomic History   • Marital status: /Civil Union     Spouse name: None   • Number of children: None   • Years of education: None   • Highest education level: None   Occupational History   • None   Tobacco Use   • Smoking status: Former     Packs/day: 0.00     Years: 0.00     Total pack years: 0.00     Types: Cigarettes     Quit date:      Years since quittin.6   • Smokeless tobacco: Never   Vaping Use   • Vaping Use: Never used   Substance and Sexual Activity   • Alcohol use: Not Currently     Alcohol/week: 0.0 standard drinks of alcohol   • Drug use: Never   • Sexual activity: None   Other Topics Concern   • None   Social History Narrative    Caffeine use      Social Determinants of Health     Financial Resource Strain: Low Risk (8/21/2023)    Overall Financial Resource Strain (CARDIA)    • Difficulty of Paying Living Expenses: Not very hard   Food Insecurity: Not on file   Transportation Needs: No Transportation Needs (8/21/2023)    PRAPARE - Transportation    • Lack of Transportation (Medical): No    • Lack of Transportation (Non-Medical): No   Physical Activity: Not on file   Stress: Not on file   Social Connections: Not on file   Intimate Partner Violence: Not on file   Housing Stability: Not on file      Medications and Allergies:     Current Outpatient Medications   Medication Sig Dispense Refill   • Aspirin 81 MG EC tablet Take 1 tablet by mouth daily     • hydrochlorothiazide (HYDRODIURIL) 25 mg tablet TAKE 1 TABLET BY MOUTH EVERY DAY 90 tablet 1   • levothyroxine 50 mcg tablet TAKE 1 TABLET BY MOUTH EVERY DAY 90 tablet 1   • magnesium Oxide (MAG-OX) 400 mg TABS Take 1 tablet by mouth daily     • metFORMIN (GLUCOPHAGE) 850 mg tablet TAKE 1 TABLET BY MOUTH TWICE A DAY WITH BREAKFAST AND DINNER 180 tablet 1   • metoprolol succinate (TOPROL-XL) 25 mg 24 hr tablet TAKE 1 TABLET (25 MG TOTAL) BY MOUTH DAILY. 90 tablet 3   • Multiple Vitamins-Minerals (CENTRUM SILVER 50+WOMEN PO) Take 1 tablet by mouth daily      • Potassium 99 MG TABS Take 1 tablet by mouth daily     • ramipril (ALTACE) 5 mg capsule Take 1 capsule (5 mg total) by mouth daily 90 capsule 3   • simvastatin (ZOCOR) 10 mg tablet TAKE 1 TABLET BY MOUTH EVERY DAY 90 tablet 1     No current facility-administered medications for this visit.      No Known Allergies   Immunizations:     Immunization History   Administered Date(s) Administered   • COVID-19 PFIZER VACCINE 0.3 ML IM 01/22/2021, 02/12/2021, 01/14/2022   • Hep A, adult 01/28/2015, 07/28/2015   • INFLUENZA 10/27/2018, 10/11/2019, 09/05/2020, 09/25/2021   • Influenza, seasonal, injectable 11/15/2010, 10/16/2015   • Pneumococcal Conjugate 13-Valent 07/18/2017   • Pneumococcal Polysaccharide PPV23 07/12/2016   • Zoster Vaccine Recombinant 09/08/2020, 03/15/2021      Health Maintenance: There are no preventive care reminders to display for this patient. Topic Date Due   • COVID-19 Vaccine (4 - Pfizer series) 03/11/2022   • Influenza Vaccine (1) 09/01/2023      Medicare Screening Tests and Risk Assessments:     April is here for her Subsequent Wellness visit. Health Risk Assessment:   Patient rates overall health as good. Patient feels that their physical health rating is same. Patient is satisfied with their life. Eyesight was rated as slightly worse. Hearing was rated as same. Patient feels that their emotional and mental health rating is same. Patients states they are never, rarely angry. Patient states they are sometimes unusually tired/fatigued. Pain experienced in the last 7 days has been some. Patient's pain rating has been 4/10. Patient states that she has experienced no weight loss or gain in last 6 months. Fall Risk Screening: In the past year, patient has experienced: no history of falling in past year      Urinary Incontinence Screening:   Patient has not leaked urine accidently in the last six months. Home Safety:  Patient does not have trouble with stairs inside or outside of their home. Patient has working smoke alarms and has working carbon monoxide detector. Home safety hazards include: none. Nutrition:   Current diet is Regular and Limited junk food. Medications:   Patient is currently taking over-the-counter supplements. OTC medications include: Magnesium, potassium, vitamins. Patient is able to manage medications. Activities of Daily Living (ADLs)/Instrumental Activities of Daily Living (IADLs):   Walk and transfer into and out of bed and chair?: Yes  Dress and groom yourself?: Yes    Bathe or shower yourself?: Yes    Feed yourself?  Yes  Do your laundry/housekeeping?: Yes  Manage your money, pay your bills and track your expenses?: Yes  Make your own meals?: Yes    Do your own shopping?: Yes    Previous Hospitalizations:   Any hospitalizations or ED visits within the last 12 months?: No      Advance Care Planning:   Living will: Yes    Durable POA for healthcare: Yes    Advanced directive: Yes      PREVENTIVE SCREENINGS      Cardiovascular Screening:    General: Screening Not Indicated and History Lipid Disorder      Diabetes Screening:     General: Screening Not Indicated and History Diabetes      Cervical Cancer Screening:    General: Screening Not Indicated      Lung Cancer Screening:     General: Screening Not Indicated    Screening, Brief Intervention, and Referral to Treatment (SBIRT)    Screening  Typical number of drinks in a day: 0  Typical number of drinks in a week: 0  Interpretation: Low risk drinking behavior. AUDIT-C Screenin) How often did you have a drink containing alcohol in the past year? monthly or less  2) How many drinks did you have on a typical day when you were drinking in the past year? 1 to 2  3) How often did you have 6 or more drinks on one occasion in the past year? never    AUDIT-C Score: 1  Interpretation: Score 0-2 (female): Negative screen for alcohol misuse    Single Item Drug Screening:  How often have you used an illegal drug (including marijuana) or a prescription medication for non-medical reasons in the past year? never    Single Item Drug Screen Score: 0  Interpretation: Negative screen for possible drug use disorder    No results found. Physical Exam:     /64 (Cuff Size: Large)   Pulse 68   Temp 97.6 °F (36.4 °C)   Resp 16   Ht 5' 4.5" (1.638 m)   Wt 105 kg (232 lb)   SpO2 97%   BMI 39.21 kg/m²     Physical Exam  Constitutional:       General: She is not in acute distress. Appearance: Normal appearance. She is well-developed. She is obese. She is not diaphoretic. HENT:      Head: Normocephalic and atraumatic. Eyes:      Extraocular Movements: Extraocular movements intact.       Conjunctiva/sclera: Conjunctivae normal.      Pupils: Pupils are equal, round, and reactive to light. Cardiovascular:      Rate and Rhythm: Normal rate and regular rhythm. Pulses: Normal pulses. Heart sounds: Murmur heard. Comments: Diminished S1 and a 2/6 systolic murmur. Pulmonary:      Effort: Pulmonary effort is normal. No respiratory distress. Breath sounds: No stridor. Musculoskeletal:         General: Normal range of motion. Cervical back: Normal range of motion. Skin:     General: Skin is warm and dry. Neurological:      Mental Status: She is alert. Psychiatric:         Behavior: Behavior normal.         Thought Content:  Thought content normal.         Judgment: Judgment normal.          Saul Avitia MD

## 2023-08-27 DIAGNOSIS — I10 ESSENTIAL HYPERTENSION: ICD-10-CM

## 2023-08-28 ENCOUNTER — OFFICE VISIT (OUTPATIENT)
Dept: FAMILY MEDICINE CLINIC | Facility: MEDICAL CENTER | Age: 83
End: 2023-08-28
Payer: MEDICARE

## 2023-08-28 VITALS
WEIGHT: 232 LBS | DIASTOLIC BLOOD PRESSURE: 64 MMHG | OXYGEN SATURATION: 97 % | BODY MASS INDEX: 38.65 KG/M2 | RESPIRATION RATE: 16 BRPM | TEMPERATURE: 97.6 F | SYSTOLIC BLOOD PRESSURE: 130 MMHG | HEIGHT: 65 IN | HEART RATE: 68 BPM

## 2023-08-28 DIAGNOSIS — E03.9 ACQUIRED HYPOTHYROIDISM: ICD-10-CM

## 2023-08-28 DIAGNOSIS — I47.1 SVT (SUPRAVENTRICULAR TACHYCARDIA) (HCC): ICD-10-CM

## 2023-08-28 DIAGNOSIS — N18.31 STAGE 3A CHRONIC KIDNEY DISEASE (HCC): ICD-10-CM

## 2023-08-28 DIAGNOSIS — E78.5 DYSLIPIDEMIA: ICD-10-CM

## 2023-08-28 DIAGNOSIS — E66.01 MORBID OBESITY WITH BMI OF 40.0-44.9, ADULT (HCC): ICD-10-CM

## 2023-08-28 DIAGNOSIS — I10 ESSENTIAL HYPERTENSION: ICD-10-CM

## 2023-08-28 DIAGNOSIS — Z00.00 PREVENTATIVE HEALTH CARE: Primary | ICD-10-CM

## 2023-08-28 PROCEDURE — G0439 PPPS, SUBSEQ VISIT: HCPCS | Performed by: FAMILY MEDICINE

## 2023-08-28 PROCEDURE — 99214 OFFICE O/P EST MOD 30 MIN: CPT | Performed by: FAMILY MEDICINE

## 2023-08-28 RX ORDER — HYDROCHLOROTHIAZIDE 25 MG/1
TABLET ORAL
Qty: 90 TABLET | Refills: 1 | Status: SHIPPED | OUTPATIENT
Start: 2023-08-28

## 2023-08-28 NOTE — ASSESSMENT & PLAN NOTE
Lab Results   Component Value Date    EGFR 54 08/14/2023    EGFR 50 08/12/2022    EGFR 61 08/11/2021    CREATININE 0.97 08/14/2023    CREATININE 1.05 08/12/2022    CREATININE 0.90 08/11/2021   This is more or less stable. Once again I have emphasized the need to stay well-hydrated and avoid NSAIDs. Also keep blood pressure and diabetes under good control.

## 2023-08-28 NOTE — ASSESSMENT & PLAN NOTE
Blood pressure today is 130/64's. She is taking Altace, hydrochlorothiazide and metoprolol. Metoprolol has a dual purpose, she is taking metoprolol also for SVT. Continue those medications, try to lose some weight, low-salt diet and exercise is much as she can tolerate.

## 2023-08-28 NOTE — ASSESSMENT & PLAN NOTE
BMI Counseling: Body mass index is 39.21 kg/m². The BMI is above normal. Nutrition recommendations include reducing portion sizes, decreasing overall calorie intake and consuming healthier snacks. Exercise recommendations include exercising 3-5 times per week.

## 2023-08-28 NOTE — ASSESSMENT & PLAN NOTE
Cholesterol is at goal taking simvastatin. Continue simvastatin, continue attempts at losing weight, low-fat diet.

## 2023-08-28 NOTE — ASSESSMENT & PLAN NOTE
She is taking metoprolol and has been effective at preventing symptoms. She will continue following up with cardiology.

## 2023-10-09 ENCOUNTER — OFFICE VISIT (OUTPATIENT)
Dept: FAMILY MEDICINE CLINIC | Facility: MEDICAL CENTER | Age: 83
End: 2023-10-09
Payer: MEDICARE

## 2023-10-09 VITALS
HEART RATE: 76 BPM | RESPIRATION RATE: 18 BRPM | SYSTOLIC BLOOD PRESSURE: 134 MMHG | BODY MASS INDEX: 38.55 KG/M2 | HEIGHT: 65 IN | DIASTOLIC BLOOD PRESSURE: 76 MMHG | WEIGHT: 231.4 LBS | TEMPERATURE: 97.4 F

## 2023-10-09 DIAGNOSIS — E78.5 DYSLIPIDEMIA: ICD-10-CM

## 2023-10-09 DIAGNOSIS — I47.10 SVT (SUPRAVENTRICULAR TACHYCARDIA): ICD-10-CM

## 2023-10-09 DIAGNOSIS — N18.31 STAGE 3A CHRONIC KIDNEY DISEASE (HCC): ICD-10-CM

## 2023-10-09 DIAGNOSIS — H25.013 CORTICAL AGE-RELATED CATARACT OF BOTH EYES: Primary | ICD-10-CM

## 2023-10-09 DIAGNOSIS — E11.3293 TYPE 2 DIABETES MELLITUS WITH BOTH EYES AFFECTED BY MILD NONPROLIFERATIVE RETINOPATHY WITHOUT MACULAR EDEMA, WITHOUT LONG-TERM CURRENT USE OF INSULIN (HCC): ICD-10-CM

## 2023-10-09 DIAGNOSIS — I10 ESSENTIAL HYPERTENSION: ICD-10-CM

## 2023-10-09 PROCEDURE — 99215 OFFICE O/P EST HI 40 MIN: CPT | Performed by: FAMILY MEDICINE

## 2023-10-09 RX ORDER — PREDNISOLONE ACETATE 10 MG/ML
SUSPENSION/ DROPS OPHTHALMIC
COMMUNITY
Start: 2023-10-02

## 2023-10-09 RX ORDER — MOXIFLOXACIN 5 MG/ML
SOLUTION/ DROPS OPHTHALMIC
COMMUNITY
Start: 2023-09-27

## 2023-10-09 RX ORDER — GABAPENTIN 100 MG/1
CAPSULE ORAL
COMMUNITY
Start: 2023-10-04

## 2023-10-09 NOTE — ASSESSMENT & PLAN NOTE
Blood pressure today is 134/76. She is taking Altace, hydrochlorothiazide and metoprolol. Metoprolol has a dual purpose, she is taking metoprolol also for SVT. Continue those medications, try to lose some weight, low-salt diet and exercise is much as she can tolerate.

## 2023-10-09 NOTE — ASSESSMENT & PLAN NOTE
She has bilateral cataracts. She is planning to have cataract extractions on October 17, 2023 and October 31, 2023. The surgeon is Debbie Ramsey MD.    I do not see any contraindications to this planned procedure.

## 2023-10-09 NOTE — ASSESSMENT & PLAN NOTE
She is doing well with her A1c. The only medication that she is taking for diabetes is the metformin. Continue metformin, she needs to do more exercise and she would benefit from some weight loss as well. Continue dietary compliance.     Lab Results   Component Value Date    HGBA1C 6.9 (H) 08/14/2023

## 2023-10-09 NOTE — ASSESSMENT & PLAN NOTE
Lab Results   Component Value Date    EGFR 54 08/14/2023    EGFR 50 08/12/2022    EGFR 61 08/11/2021    CREATININE 0.97 08/14/2023    CREATININE 1.05 08/12/2022    CREATININE 0.90 08/11/2021   Stable. Avoid NSAID and stay well hydrated.  Keep DM and HTN under good control

## 2023-10-09 NOTE — PROGRESS NOTES
Name: Leslie Hollis      : 1940      MRN: [id number]  Encounter Provider: Negrita Tobin MD  Encounter Date: 10/9/2023   Encounter department: [address] [place] [address]    Assessment & Plan     1. Cortical age-related cataract of both eyes  Assessment & Plan:  She has bilateral cataracts. She is planning to have cataract extractions on 2023 and 2023. The surgeon is Marcel Briones MD.    I do not see any contraindications to this planned procedure. 2. SVT (supraventricular tachycardia)  Assessment & Plan:  She is taking metoprolol and has been effective at preventing symptoms. She will continue following up with cardiology. 3. Essential hypertension  Assessment & Plan:  Blood pressure today is 134/76. She is taking Altace, hydrochlorothiazide and metoprolol. Metoprolol has a dual purpose, she is taking metoprolol also for SVT. Continue those medications, try to lose some weight, low-salt diet and exercise is much as she can tolerate. 4. Stage 3a chronic kidney disease [facility])  Assessment & Plan:  Lab Results   Component Value Date    EGFR 54 2023    EGFR 50 2022    EGFR 61 2021    CREATININE 0.97 2023    CREATININE 1.05 2022    CREATININE 0.90 2021   Stable. Avoid NSAID and stay well hydrated. Keep DM and HTN under good control      5. Dyslipidemia  Assessment & Plan:  Cholesterol is at goal taking simvastatin. Continue simvastatin, continue attempts at losing weight, low-fat diet. 6. Type 2 diabetes mellitus with both eyes affected by mild nonproliferative retinopathy without macular edema, without long-term current use of insulin ([address])  Assessment & Plan:  She is doing well with her A1c. The only medication that she is taking for diabetes is the metformin. Continue metformin, she needs to do more exercise and she would benefit from some weight loss as well. Continue dietary compliance.     Lab Results Component Value Date    HGBA1C 6.9 (H) 2023                Subjective     Review of Systems    Past Medical History:   Diagnosis Date   • Chronic kidney disease     Stage III   • Diabetes mellitus ([address])    • Disease of thyroid gland    • Hyperlipidemia    • Hypertension    • Irregular heart beat    • LBBB (left bundle branch block)    • Palpitations    • SVT (supraventricular tachycardia) 2019    Resolved Post Ablation     Past Surgical History:   Procedure Laterality Date   • APPENDECTOMY     • CARDIAC ELECTROPHYSIOLOGY MAPPING AND ABLATION  2019   • CARPAL TUNNEL RELEASE Right    •  SECTION     • HERNIA REPAIR     • JOINT REPLACEMENT Right     Knee   • JOINT REPLACEMENT Right     Hip   • KNEE ARTHROSCOPY Right     therapeutic    • KNEE SURGERY Left     Cartilage repair   • THYROID LOBECTOMY Left 2021    Procedure: HEMITHYROIDECTOMY;  Surgeon: Riccardo Sosa MD;  [facility];  Service: Surgical Oncology   • TONSILLECTOMY     • US GUIDED THYROID BIOPSY  2021     Family History   Problem Relation Age of Onset   • Arthritis Mother    • Other Mother         CABG   • Cancer Mother    • Heart disease Mother    • Osteoporosis Mother    • Lung cancer Father    • Pancreatic cancer Father      Social History     Socioeconomic History   • Marital status: /Civil Union     Spouse name: None   • Number of children: None   • Years of education: None   • Highest education level: None   Occupational History   • None   Tobacco Use   • Smoking status: Former     Packs/day: 0.00     Years: 0.00     Total pack years: 0.00     Types: Cigarettes     Quit date:      Years since quittin.8   • Smokeless tobacco: Never   Vaping Use   • Vaping Use: Never used   Substance and Sexual Activity   • Alcohol use: Not Currently     Alcohol/week: 0.0 standard drinks of alcohol   • Drug use: Never   • Sexual activity: None   Other Topics Concern   • None   Social History Narrative    Caffeine use Social Determinants of Health     Financial Resource Strain: Low Risk  (8/21/2023)    Overall Financial Resource Strain (CARDIA)    • Difficulty of Paying Living Expenses: Not very hard   Food Insecurity: Not on file   Transportation Needs: No Transportation Needs (8/21/2023)    PRAPARE - Transportation    • Lack of Transportation (Medical): No    • Lack of Transportation (Non-Medical): No   Physical Activity: Not on file   Stress: Not on file   Social Connections: Not on file   Intimate Partner Violence: Not on file   Housing Stability: Not on file     Current Outpatient Medications on File Prior to Visit   Medication Sig   • Aspirin 81 MG EC tablet Take 1 tablet by mouth daily   • gabapentin (NEURONTIN) 100 mg capsule    • hydrochlorothiazide (HYDRODIURIL) 25 mg tablet TAKE 1 TABLET BY MOUTH EVERY DAY   • levothyroxine 50 mcg tablet TAKE 1 TABLET BY MOUTH EVERY DAY   • metFORMIN (GLUCOPHAGE) 850 mg tablet TAKE 1 TABLET BY MOUTH TWICE A DAY WITH BREAKFAST AND DINNER   • metoprolol succinate (TOPROL-XL) 25 mg 24 hr tablet TAKE 1 TABLET (25 MG TOTAL) BY MOUTH DAILY.    • moxifloxacin (VIGAMOX) 0.5 % ophthalmic solution INSTILL 1 DROP INTO LEFT EYE FOUR TIMES A DAY AS DIRECTED FOR BEFORE AND AFTER SURGERY   • Multiple Vitamins-Minerals (CENTRUM SILVER 50+WOMEN PO) Take 1 tablet by mouth daily    • Potassium 99 MG TABS Take 1 tablet by mouth daily   • prednisoLONE acetate (PRED FORTE) 1 % ophthalmic suspension    • ramipril (ALTACE) 5 mg capsule Take 1 capsule (5 mg total) by mouth daily   • simvastatin (ZOCOR) 10 mg tablet TAKE 1 TABLET BY MOUTH EVERY DAY   • magnesium Oxide (MAG-OX) 400 mg TABS Take 1 tablet by mouth daily (Patient not taking: Reported on 10/9/2023)     No Known Allergies  Immunization History   Administered Date(s) Administered   • COVID-19 PFIZER VACCINE 0.3 ML IM 01/22/2021, 02/12/2021, 01/14/2022   • Hep A, adult 01/28/2015, 07/28/2015   • INFLUENZA 10/27/2018, 10/11/2019, 09/05/2020, 09/25/2021, 10/04/2022, 10/02/2023   • Influenza, seasonal, injectable 11/15/2010, 10/16/2015   • Pneumococcal Conjugate 13-Valent 07/18/2017   • Pneumococcal Polysaccharide PPV23 07/12/2016   • Zoster Vaccine Recombinant 09/08/2020, 03/15/2021       Objective     /76 (BP Location: Left arm, Patient Position: Sitting, Cuff Size: Large)   Pulse 76   Temp (!) 97.4 °F (36.3 °C)   Resp 18   Ht 5' 4.5" (1.638 m)   Wt 105 kg (231 lb 6.4 oz)   BMI 39.11 kg/m²     Physical Exam  Mann Vera MD

## 2023-10-19 DIAGNOSIS — E11.9 TYPE 2 DIABETES MELLITUS WITHOUT COMPLICATION, WITHOUT LONG-TERM CURRENT USE OF INSULIN (HCC): ICD-10-CM

## 2023-10-28 DIAGNOSIS — E78.2 MIXED HYPERLIPIDEMIA: ICD-10-CM

## 2023-10-28 DIAGNOSIS — E03.9 HYPOTHYROIDISM, UNSPECIFIED TYPE: ICD-10-CM

## 2023-10-30 RX ORDER — SIMVASTATIN 10 MG
TABLET ORAL
Qty: 90 TABLET | Refills: 1 | Status: SHIPPED | OUTPATIENT
Start: 2023-10-30

## 2023-10-30 RX ORDER — LEVOTHYROXINE SODIUM 0.05 MG/1
TABLET ORAL
Qty: 90 TABLET | Refills: 1 | Status: SHIPPED | OUTPATIENT
Start: 2023-10-30

## 2023-12-06 ENCOUNTER — OFFICE VISIT (OUTPATIENT)
Dept: CARDIOLOGY CLINIC | Facility: MEDICAL CENTER | Age: 83
End: 2023-12-06
Payer: MEDICARE

## 2023-12-06 VITALS
WEIGHT: 228 LBS | HEIGHT: 65 IN | HEART RATE: 90 BPM | DIASTOLIC BLOOD PRESSURE: 80 MMHG | BODY MASS INDEX: 37.99 KG/M2 | OXYGEN SATURATION: 96 % | SYSTOLIC BLOOD PRESSURE: 130 MMHG

## 2023-12-06 DIAGNOSIS — E78.5 DYSLIPIDEMIA: ICD-10-CM

## 2023-12-06 DIAGNOSIS — I47.10 SVT (SUPRAVENTRICULAR TACHYCARDIA): ICD-10-CM

## 2023-12-06 DIAGNOSIS — I10 ESSENTIAL HYPERTENSION: Primary | ICD-10-CM

## 2023-12-06 DIAGNOSIS — I44.7 LBBB (LEFT BUNDLE BRANCH BLOCK): ICD-10-CM

## 2023-12-06 DIAGNOSIS — Z01.810 PREOP CARDIOVASCULAR EXAM: ICD-10-CM

## 2023-12-06 PROCEDURE — 99214 OFFICE O/P EST MOD 30 MIN: CPT | Performed by: INTERNAL MEDICINE

## 2023-12-06 NOTE — PROGRESS NOTES
Cardiology   April Cade 83 y.o. female MRN: [id number]        Impression:  1. Paroxysmal SVT - s/p ablation . No palpitations. 2. Hypertension - controlled. 3. Dyslipidemia - on statin. 4. Chronic LBBB  5. Preop knee surgery - patient at acceptable cardiovascular risk to proceed with surgery. Recommendations:  1. Continue current medications. 2. Patient at acceptable cardiovascular risk to proceed with surgery. 3. Follow up in 1 year. HPI: Leslie Hollis is a 82y.o. year old female with paroxysmal SVT s/p ablation , dyslipidemia, LBBB, and hypertension, who returns for follow up. No chest pain, shortness of breath, or palpitations. Review of Systems   Constitutional: Negative. HENT: Negative. Eyes: Negative. Respiratory:  Negative for chest tightness and shortness of breath. Cardiovascular:  Negative for chest pain, palpitations and leg swelling. Gastrointestinal: Negative. Endocrine: Negative. Genitourinary: Negative. Musculoskeletal:  Positive for joint swelling. Skin: Negative. Allergic/Immunologic: Negative. Neurological: Negative. Hematological: Negative. Psychiatric/Behavioral: Negative. All other systems reviewed and are negative.         Past Medical History:   Diagnosis Date    Chronic kidney disease     Stage III    Diabetes mellitus ([address])     Disease of thyroid gland     Hyperlipidemia     Hypertension     Irregular heart beat     LBBB (left bundle branch block)     Palpitations     SVT (supraventricular tachycardia) 2019    Resolved Post Ablation     Past Surgical History:   Procedure Laterality Date    APPENDECTOMY      CARDIAC ELECTROPHYSIOLOGY MAPPING AND ABLATION  2019    CARPAL TUNNEL RELEASE Right      SECTION      HERNIA REPAIR      JOINT REPLACEMENT Right     Knee    JOINT REPLACEMENT Right     Hip    KNEE ARTHROSCOPY Right     therapeutic     KNEE SURGERY Left     Cartilage repair    THYROID LOBECTOMY Left 2021    Procedure: HEMITHYROIDECTOMY;  Surgeon: Waleska Guzman MD;  [facility];  Service: Surgical Oncology    TONSILLECTOMY      US GUIDED THYROID BIOPSY  2021     Social History     Substance and Sexual Activity   Alcohol Use Not Currently    Alcohol/week: 0.0 standard drinks of alcohol     Social History     Substance and Sexual Activity   Drug Use Never     Social History     Tobacco Use   Smoking Status Former    Packs/day: 0.00    Years: 0.00    Total pack years: 0.00    Types: Cigarettes    Quit date:     Years since quittin.9   Smokeless Tobacco Never     Family History   Problem Relation Age of Onset    Arthritis Mother     Other Mother         CABG    Cancer Mother     Heart disease Mother     Osteoporosis Mother     Lung cancer Father     Pancreatic cancer Father        Allergies:  No Known Allergies    Medications:     Current Outpatient Medications:     Aspirin 81 MG EC tablet, Take 1 tablet by mouth daily, Disp: , Rfl:     hydrochlorothiazide (HYDRODIURIL) 25 mg tablet, TAKE 1 TABLET BY MOUTH EVERY DAY, Disp: 90 tablet, Rfl: 1    levothyroxine 50 mcg tablet, TAKE 1 TABLET BY MOUTH EVERY DAY, Disp: 90 tablet, Rfl: 1    metFORMIN (GLUCOPHAGE) 850 mg tablet, TAKE 1 TABLET BY MOUTH TWICE A DAY WITH BREAKFAST AND DINNER, Disp: 180 tablet, Rfl: 1    metoprolol succinate (TOPROL-XL) 25 mg 24 hr tablet, TAKE 1 TABLET (25 MG TOTAL) BY MOUTH DAILY. , Disp: 90 tablet, Rfl: 3    Multiple Vitamins-Minerals (CENTRUM SILVER 50+WOMEN PO), Take 1 tablet by mouth daily , Disp: , Rfl:     Potassium 99 MG TABS, Take 1 tablet by mouth daily, Disp: , Rfl:     prednisoLONE acetate (PRED FORTE) 1 % ophthalmic suspension, , Disp: , Rfl:     ramipril (ALTACE) 5 mg capsule, Take 1 capsule (5 mg total) by mouth daily, Disp: 90 capsule, Rfl: 3    simvastatin (ZOCOR) 10 mg tablet, TAKE 1 TABLET BY MOUTH EVERY DAY, Disp: 90 tablet, Rfl: 1    gabapentin (NEURONTIN) 100 mg capsule, , Disp: , Rfl:     magnesium Oxide (MAG-OX) 400 mg TABS, Take 1 tablet by mouth daily (Patient not taking: Reported on 10/9/2023), Disp: , Rfl:     moxifloxacin (VIGAMOX) 0.5 % ophthalmic solution, INSTILL 1 DROP INTO LEFT EYE FOUR TIMES A DAY AS DIRECTED FOR BEFORE AND AFTER SURGERY, Disp: , Rfl:       Wt Readings from Last 3 Encounters:   12/06/23 103 kg (228 lb)   10/09/23 105 kg (231 lb 6.4 oz)   08/28/23 105 kg (232 lb)     Temp Readings from Last 3 Encounters:   10/09/23 (!) 97.4 °F (36.3 °C)   08/28/23 97.6 °F (36.4 °C)   06/29/23 (!) 97.1 °F (36.2 °C)     BP Readings from Last 3 Encounters:   12/06/23 130/80   10/09/23 134/76   08/28/23 130/64     Pulse Readings from Last 3 Encounters:   12/06/23 90   10/09/23 76   08/28/23 68         Physical Exam  HENT:      Head: Atraumatic. Mouth/Throat:      Mouth: Mucous membranes are moist.   Eyes:      Extraocular Movements: Extraocular movements intact. Cardiovascular:      Rate and Rhythm: Normal rate and regular rhythm. Heart sounds: Normal heart sounds. Pulmonary:      Effort: Pulmonary effort is normal.      Breath sounds: Normal breath sounds. Abdominal:      General: Abdomen is flat. Musculoskeletal:         General: Normal range of motion. Cervical back: Normal range of motion. Skin:     General: Skin is warm. Neurological:      General: No focal deficit present. Mental Status: She is alert and oriented to person, place, and time.    Psychiatric:         Mood and Affect: Mood normal.         Behavior: Behavior normal.           Laboratory Studies:  CMP:  Lab Results   Component Value Date     01/10/2018    K 3.9 08/14/2023     08/14/2023    CO2 33 (H) 08/14/2023    ANIONGAP 8 10/14/2015    BUN 20 08/14/2023    CREATININE 0.97 08/14/2023    GLUCOSE 172 (H) 10/14/2015    AST 16 08/14/2023    ALT 12 08/14/2023    BILITOT 0.54 10/14/2015    EGFR 54 08/14/2023       Lipid Profile:   Lab Results   Component Value Date    CHOL 153 07/10/2017     Lab Results   Component Value Date    HDL 55 2023     Lab Results   Component Value Date    LDLCALC 66 2023     Lab Results   Component Value Date    TRIG 168 (H) 2023       Cardiac testing:   EKG reviewed personally:   Results for orders placed during the hospital encounter of 10/24/19    Echo complete with contrast if indicated    Narrative  [address], [address] [facility]  [phone]    Transthoracic Echocardiogram  Limited 2D, M-mode, Doppler, and Color Doppler    Study date:  24-Oct-2019    Patient: Jackelin Gipson  MR number: [id number]  Account number: [de-identified]  : 1940  Age: 70 years  Gender: Female  Status: Outpatient  Location: [facility]  Height: 64 in  Weight: 237 lb  BP: 122/ 78 mmHg    Indications: Murmur. Diagnoses: R01.1 - Cardiac murmur, unspecified    Sonographer:  FRANCA Hearn  Primary Physician:  Heriberto Gutierrez MD  Referring Physician:  Roxanne Capellan MD  Group:  [facility]  Interpreting Physician:  Ly Jansen DO    IMPRESSIONS:  Normal left ventricular size and systolic function, ejection fraction 55%. Abnormal septal motion consistent with left bundle branch block. Grade 1 diastolic dysfunction. Normal right ventricular size and systolic function. Normal aortic root. Moderate left atrial dilation. Normal right atrium. Mildly calcified and sclerotic aortic valve with mild insufficiency and no stenosis. No other significant valve abnormalities. Normal IVC size and inspiratory collapse. RA pressure estimated at 3 mmHg. Upper normal pulmonary pressure. PASP estimated at 36 mmHg. Compared to prior echocardiogram, the aortic valve is now mildly calcified and sclerotic with mild aortic insufficiency. SUMMARY    LEFT VENTRICLE:  Systolic function was normal by visual assessment. Ejection fraction was estimated to be 55 %.   There were no regional wall motion abnormalities. Doppler parameters were consistent with abnormal left ventricular relaxation (grade 1 diastolic dysfunction). VENTRICULAR SEPTUM:  There was mild paradoxical motion. These changes are consistent with LBBB. RIGHT VENTRICLE:  Systolic pressure was at the upper limits of normal. Estimated peak pressure was 36 mmHg. LEFT ATRIUM:  The atrium was moderately dilated. AORTIC VALVE:  Leaflets exhibited moderate calcification, lower normal cuspal separation, and sclerosis. There was mild regurgitation. Regurgitation grade was 1+ on a scale of 0 to 4+. COMPARISONS:  Compared to prior echocardiogram, the aortic valve is now mildly calcified and sclerotic with mild aortic insufficiency. HISTORY: PRIOR HISTORY: SVT. LBBB. Risk factors: diabetes, medication-treated hypercholesterolemia, and morbid obesity. PRIOR PROCEDURES: Arrhythmia ablation. PROCEDURE: The study was performed in the [facility]. This was a routine study. The transthoracic approach was used. The study included limited 2D imaging, M-mode, complete spectral Doppler, and color Doppler. The  heart rate was 78 bpm, at the start of the study. Images were obtained from the parasternal, apical, subcostal, and suprasternal notch acoustic windows. Intravenous contrast ( 1.2 Definity in NSS. , 6 ml) was administered to opacify the  left ventricle. This was a technically difficult study. LEFT VENTRICLE: Size was normal. Systolic function was normal by visual assessment. Ejection fraction was estimated to be 55 %. There were no regional wall motion abnormalities. Wall thickness was normal. DOPPLER: Doppler parameters were  consistent with abnormal left ventricular relaxation (grade 1 diastolic dysfunction). VENTRICULAR SEPTUM: There was mild paradoxical motion. These changes are consistent with LBBB.     RIGHT VENTRICLE: The size was normal. Systolic function was normal. Wall thickness was normal. DOPPLER: Systolic pressure was at the upper limits of normal. Estimated peak pressure was 36 mmHg. LEFT ATRIUM: The atrium was moderately dilated. RIGHT ATRIUM: Size was normal.    MITRAL VALVE: Valve structure was normal. There was normal leaflet separation. DOPPLER: The transmitral velocity was within the normal range. There was no evidence for stenosis. There was no regurgitation. AORTIC VALVE: Leaflets exhibited moderate calcification, lower normal cuspal separation, and sclerosis. DOPPLER: Transaortic velocity was minimally increased. There was no evidence for stenosis. There was mild regurgitation. Regurgitation  grade was 1+ on a scale of 0 to 4+. TRICUSPID VALVE: The valve structure was normal. There was normal leaflet separation. DOPPLER: The transtricuspid velocity was within the normal range. There was no evidence for stenosis. There was no regurgitation. PULMONIC VALVE: Leaflets exhibited normal thickness, no calcification, and normal cuspal separation. DOPPLER: The transpulmonic velocity was within the normal range. There was no regurgitation. PERICARDIUM: There was no pericardial effusion. The pericardium was normal in appearance. AORTA: The root exhibited normal size. SYSTEMIC VEINS: IVC: The inferior vena cava was normal in size and course. Respirophasic changes were normal. RA pressure estimated at 3 mmHg. SYSTEM MEASUREMENT TABLES    2D  %FS: 24.97 %  Ao Diam: 3.02 cm  EDV(Teich): 72.41 ml  EF(Teich): 49.89 %  ESV(Teich): 36.28 ml  IVSd: 1.01 cm  LA Diam: 3.31 cm  LVIDd: 4.06 cm  LVIDs: 3.04 cm  LVOT Diam: 1.83 cm  LVPWd: 0.99 cm  RWT: 0.49  SV(Teich): 36.13 ml    2D mode  LAAs: 20.5 cm2  RAAs: 11.4 cm2    CW  AR Dec [place]: 2.55 m/s2  AR Dec Time: 1422.06 ms  AR PHT: 412.4 ms  AR Vmax: 3.63 m/s  AR maxP.69 mmHg  AV Env. Ti: 342.53 ms  AV MaxP.45 mmHg  AV VTI: 44.79 cm  AV Vmax: 1.83 m/s  AV Vmean: 1.31 m/s  AV meanP.52 mmHg  TR MaxP.55 mmHg  TR Vmax: 2.85 m/s    MM  TAPSE: 2.45 cm    PW  DEMETRIA (VTI): 1.8 cm2  DEMETRIA Vmax: 1.89 cm2  E': 0.06 m/s  E/E': 12.59  LVOT Env. Ti: 338.73 ms  LVOT VTI: 30.58 cm  LVOT Vmax: 1.31 m/s  LVOT Vmean: 0.9 m/s  LVOT maxP.89 mmHg  LVOT meanPG: 3.84 mmHg  LVSV Dopp: 80.72 ml  MV A Jude: 1.12 m/s  MV Dec [place]: 4.68 m/s2  MV DecT: 170.34 ms  MV E Jude: 0.8 m/s  MV E/A Ratio: 0.71  MV PHT: 49.4 ms  MVA By PHT: 4.45 cm2    [employer] Accredited Echocardiography Laboratory    Prepared and electronically signed by    Brad Guerrero DO  Signed 24-Oct-2019 14:28:31    No results found for this or any previous visit. No results found for this or any previous visit. No results found for this or any previous visit.

## 2023-12-06 NOTE — PATIENT INSTRUCTIONS
Recommendations:  1. Continue current medications. 2. Patient at acceptable cardiovascular risk to proceed with surgery. 3. Follow up in 1 year.

## 2023-12-14 ENCOUNTER — RA CDI HCC (OUTPATIENT)
Dept: OTHER | Facility: HOSPITAL | Age: 83
End: 2023-12-14

## 2023-12-14 NOTE — PROGRESS NOTES
[address] [facility] coding opportunities          Chart Reviewed number of suggestions sent to Provider: 2     Patients Insurance     Medicare Insurance: [employer]

## 2023-12-20 ENCOUNTER — CONSULT (OUTPATIENT)
Dept: FAMILY MEDICINE CLINIC | Facility: MEDICAL CENTER | Age: 83
End: 2023-12-20
Payer: MEDICARE

## 2023-12-20 VITALS
OXYGEN SATURATION: 97 % | HEART RATE: 72 BPM | RESPIRATION RATE: 16 BRPM | SYSTOLIC BLOOD PRESSURE: 136 MMHG | HEIGHT: 65 IN | DIASTOLIC BLOOD PRESSURE: 74 MMHG | WEIGHT: 227.6 LBS | BODY MASS INDEX: 37.92 KG/M2

## 2023-12-20 DIAGNOSIS — I10 ESSENTIAL HYPERTENSION: ICD-10-CM

## 2023-12-20 DIAGNOSIS — E78.5 DYSLIPIDEMIA: ICD-10-CM

## 2023-12-20 DIAGNOSIS — E03.9 ACQUIRED HYPOTHYROIDISM: ICD-10-CM

## 2023-12-20 DIAGNOSIS — E11.3293 TYPE 2 DIABETES MELLITUS WITH BOTH EYES AFFECTED BY MILD NONPROLIFERATIVE RETINOPATHY WITHOUT MACULAR EDEMA, WITHOUT LONG-TERM CURRENT USE OF INSULIN (HCC): ICD-10-CM

## 2023-12-20 DIAGNOSIS — E66.01 MORBID OBESITY WITH BMI OF 40.0-44.9, ADULT (HCC): ICD-10-CM

## 2023-12-20 DIAGNOSIS — M17.12 ARTHRITIS OF LEFT KNEE: Primary | ICD-10-CM

## 2023-12-20 DIAGNOSIS — N18.31 STAGE 3A CHRONIC KIDNEY DISEASE (HCC): ICD-10-CM

## 2023-12-20 PROCEDURE — 99214 OFFICE O/P EST MOD 30 MIN: CPT | Performed by: FAMILY MEDICINE

## 2023-12-20 NOTE — ASSESSMENT & PLAN NOTE
Blood pressure today is 136/74.  She is taking Altace, hydrochlorothiazide and metoprolol.  Metoprolol has a dual purpose, she is taking metoprolol also for SVT.    Continue those medications, try to lose some weight, low-salt diet and exercise is much as she can tolerate.

## 2023-12-20 NOTE — ASSESSMENT & PLAN NOTE
She is doing well with her A1c.  The only medication that she is taking for diabetes is the metformin.    Continue metformin, she needs to do more exercise but cannot because of her severe arthritis in her left knee.  And she would benefit from some weight loss as well.  Continue dietary compliance.    Lab Results   Component Value Date    HGBA1C 7.0 (H) 12/15/2023

## 2023-12-20 NOTE — ASSESSMENT & PLAN NOTE
Lab Results   Component Value Date    EGFR 54 08/14/2023    EGFR 50 08/12/2022    EGFR 61 08/11/2021    CREATININE 0.97 08/14/2023    CREATININE 1.05 08/12/2022    CREATININE 0.90 08/11/2021     I recommended that she keep herself well-hydrated, avoid NSAIDs, keep her diabetes and hypertension under control.

## 2023-12-20 NOTE — PROGRESS NOTES
Name: Abena Savage      : 1940      MRN: [id number]  Encounter Provider: Wesley Norris MD  Encounter Date: 2023   Encounter department: [facility]    Assessment & Plan     1. Arthritis of left knee  Assessment & Plan:  She is planning [facility]. This is planned on January 10, 2024.    The PAT's were reviewed and the blood work was all normal.  She will also have cardiac clearance from her cardiologist.    From my point of view as her family physician I do not see any contraindications to this planned procedure.      2. Type 2 diabetes mellitus with both eyes affected by mild nonproliferative retinopathy without macular edema, without long-term current use of insulin (HCC)  Assessment & Plan:  She is doing well with her A1c.  The only medication that she is taking for diabetes is the metformin.    Continue metformin, she needs to do more exercise but cannot because of her severe arthritis in her left knee.  And she would benefit from some weight loss as well.  Continue dietary compliance.    Lab Results   Component Value Date    HGBA1C 7.0 (H) 12/15/2023       3. Acquired hypothyroidism  Assessment & Plan:  Thyroid is under good control with levothyroxine 50 mcg daily.  She will continue taking that.      4. Essential hypertension  Assessment & Plan:  Blood pressure today is 136/74.  She is taking Altace, hydrochlorothiazide and metoprolol.  Metoprolol has a dual purpose, she is taking metoprolol also for SVT.    Continue those medications, try to lose some weight, low-salt diet and exercise is much as she can tolerate.      5. Dyslipidemia  Assessment & Plan:  Cholesterol is at goal taking simvastatin.    Continue simvastatin, continue attempts at losing weight, low-fat diet.      6. Stage 3a chronic kidney disease (HCC)  Assessment & Plan:  Lab Results   Component Value Date    EGFR 54 2023    EGFR 50 2022    EGFR 61  08/11/2021    CREATININE 0.97 08/14/2023    CREATININE 1.05 08/12/2022    CREATININE 0.90 08/11/2021     I recommended that she keep herself well-hydrated, avoid NSAIDs, keep her diabetes and hypertension under control.      7. Morbid obesity with BMI of 40.0-44.9, adult ([facility])  Assessment & Plan:  BMI Counseling: Body mass index is 38.46 kg/m². The BMI is above normal. Nutrition recommendations include reducing portion sizes, decreasing overall calorie intake and consuming healthier snacks. Exercise recommendations include exercising 3-5 times per week.               Subjective     HPI  Review of Systems   Constitutional:  Negative for chills and fever.   HENT:  Negative for ear pain, postnasal drip, rhinorrhea, sore throat and trouble swallowing.    Eyes:  Negative for pain, redness and visual disturbance.   Respiratory:  Negative for cough and shortness of breath.    Cardiovascular:  Negative for chest pain and palpitations.   Gastrointestinal:  Negative for abdominal pain, constipation, diarrhea and vomiting.   Genitourinary:  Negative for dysuria, frequency, hematuria and urgency.   Musculoskeletal:  Positive for arthralgias (Left knee pain). Negative for back pain, joint swelling and myalgias.   Skin:  Negative for color change and rash.   Neurological:  Negative for seizures and syncope.   Hematological:  Negative for adenopathy.   Psychiatric/Behavioral:  Negative for decreased concentration, dysphoric mood and sleep disturbance. The patient is not nervous/anxious.    All other systems reviewed and are negative.      Past Medical History:   Diagnosis Date   • Chronic kidney disease     Stage III   • Diabetes mellitus (HCC)    • Disease of thyroid gland    • Hyperlipidemia    • Hypertension    • Irregular heart beat    • LBBB (left bundle branch block)    • Palpitations    • SVT (supraventricular tachycardia) 2019    Resolved Post Ablation     Past Surgical History:   Procedure Laterality Date   • APPENDECTOMY      • CARDIAC ELECTROPHYSIOLOGY MAPPING AND ABLATION  2019   • CARPAL TUNNEL RELEASE Right    •  SECTION     • HERNIA REPAIR     • JOINT REPLACEMENT Right     Knee   • JOINT REPLACEMENT Right     Hip   • KNEE ARTHROSCOPY Right     therapeutic    • KNEE SURGERY Left     Cartilage repair   • THYROID LOBECTOMY Left 2021    Procedure: HEMITHYROIDECTOMY;  Surgeon: Andrea Lucio MD;  [facility];  Service: Surgical Oncology   • TONSILLECTOMY     • US GUIDED THYROID BIOPSY  2021     Family History   Problem Relation Age of Onset   • Arthritis Mother    • Other Mother         CABG   • Cancer Mother    • Heart disease Mother    • Osteoporosis Mother    • Lung cancer Father    • Pancreatic cancer Father      Social History     Socioeconomic History   • Marital status: /Civil Union     Spouse name: None   • Number of children: None   • Years of education: None   • Highest education level: None   Occupational History   • None   Tobacco Use   • Smoking status: Former     Current packs/day: 0.00     Types: Cigarettes     Quit date:      Years since quittin.0   • Smokeless tobacco: Never   Vaping Use   • Vaping status: Never Used   Substance and Sexual Activity   • Alcohol use: Not Currently     Alcohol/week: 0.0 standard drinks of alcohol   • Drug use: Never   • Sexual activity: None   Other Topics Concern   • None   Social History Narrative    Caffeine use      Social Determinants of Health     Financial Resource Strain: Low Risk  (2023)    Overall Financial Resource Strain (CARDIA)    • Difficulty of Paying Living Expenses: Not very hard   Food Insecurity: Not on file   Transportation Needs: No Transportation Needs (2023)    PRAPARE - Transportation    • Lack of Transportation (Medical): No    • Lack of Transportation (Non-Medical): No   Physical Activity: Not on file   Stress: Not on file   Social Connections: Not on file   Intimate Partner Violence: Not on file   Housing  "Stability: Not on file     Current Outpatient Medications on File Prior to Visit   Medication Sig   • Aspirin 81 MG EC tablet Take 1 tablet by mouth daily   • hydrochlorothiazide (HYDRODIURIL) 25 mg tablet TAKE 1 TABLET BY MOUTH EVERY DAY   • levothyroxine 50 mcg tablet TAKE 1 TABLET BY MOUTH EVERY DAY   • magnesium Oxide (MAG-OX) 400 mg TABS Take 1 tablet by mouth daily   • metFORMIN (GLUCOPHAGE) 850 mg tablet TAKE 1 TABLET BY MOUTH TWICE A DAY WITH BREAKFAST AND DINNER   • metoprolol succinate (TOPROL-XL) 25 mg 24 hr tablet TAKE 1 TABLET (25 MG TOTAL) BY MOUTH DAILY.   • Multiple Vitamins-Minerals (CENTRUM SILVER 50+WOMEN PO) Take 1 tablet by mouth daily    • Potassium 99 MG TABS Take 1 tablet by mouth daily   • ramipril (ALTACE) 5 mg capsule Take 1 capsule (5 mg total) by mouth daily   • simvastatin (ZOCOR) 10 mg tablet TAKE 1 TABLET BY MOUTH EVERY DAY   • prednisoLONE acetate (PRED FORTE) 1 % ophthalmic suspension  (Patient not taking: Reported on 12/20/2023)   • [DISCONTINUED] gabapentin (NEURONTIN) 100 mg capsule    • [DISCONTINUED] moxifloxacin (VIGAMOX) 0.5 % ophthalmic solution INSTILL 1 DROP INTO LEFT EYE FOUR TIMES A DAY AS DIRECTED FOR BEFORE AND AFTER SURGERY     Allergies   Allergen Reactions   • Proparacaine Itching     Immunization History   Administered Date(s) Administered   • COVID-19 PFIZER VACCINE 0.3 ML IM 01/22/2021, 02/12/2021, 01/14/2022   • Hep A, adult 01/28/2015, 07/28/2015   • INFLUENZA 10/27/2018, 10/11/2019, 09/05/2020, 09/25/2021, 10/04/2022, 10/02/2023   • Influenza, seasonal, injectable 11/15/2010, 10/16/2015   • Pneumococcal Conjugate 13-Valent 07/18/2017   • Pneumococcal Polysaccharide PPV23 07/12/2016   • Zoster Vaccine Recombinant 09/08/2020, 03/15/2021       Objective     /74 (BP Location: Left arm, Patient Position: Sitting, Cuff Size: Large)   Pulse 72   Resp 16   Ht 5' 4.5\" (1.638 m)   Wt 103 kg (227 lb 9.6 oz)   SpO2 97%   BMI 38.46 kg/m²     Physical " Exam  Vitals and nursing note reviewed.   Constitutional:       Appearance: Normal appearance. She is well-developed. She is obese.   HENT:      Head: Normocephalic and atraumatic.      Right Ear: Hearing, tympanic membrane, ear canal and external ear normal.      Left Ear: Hearing, tympanic membrane, ear canal and external ear normal.      Nose: Nose normal. No mucosal edema or rhinorrhea.      Mouth/Throat:      Mouth: Mucous membranes are moist.      Pharynx: Oropharynx is clear. Uvula midline. No oropharyngeal exudate or posterior oropharyngeal erythema.   Eyes:      General: Lids are normal.         Right eye: No discharge.         Left eye: No discharge.      Extraocular Movements: Extraocular movements intact.      Conjunctiva/sclera: Conjunctivae normal.      Pupils: Pupils are equal, round, and reactive to light.   Neck:      Thyroid: No thyroid mass or thyromegaly.      Vascular: No carotid bruit.   Cardiovascular:      Rate and Rhythm: Normal rate and regular rhythm.      Pulses: Normal pulses.      Heart sounds: Normal heart sounds, S1 normal and S2 normal. No murmur heard.     No gallop.   Pulmonary:      Effort: Pulmonary effort is normal.      Breath sounds: Normal breath sounds. No wheezing or rales.   Abdominal:      General: Bowel sounds are normal.      Palpations: Abdomen is soft.      Tenderness: There is no abdominal tenderness.      Hernia: No hernia is present.   Musculoskeletal:         General: Deformity (Left knee pain arthritis of knees.) present. Normal range of motion.      Cervical back: Normal range of motion and neck supple.   Lymphadenopathy:      Cervical: No cervical adenopathy.   Skin:     General: Skin is warm and dry.      Findings: No rash.   Neurological:      General: No focal deficit present.      Mental Status: She is oriented to person, place, and time. Mental status is at baseline.      Cranial Nerves: No cranial nerve deficit.      Sensory: No sensory deficit.       Coordination: Coordination normal.   Psychiatric:         Mood and Affect: Mood normal.         Speech: Speech normal.         Behavior: Behavior normal. Behavior is cooperative.         Thought Content: Thought content normal.         Judgment: Judgment normal.       Wesley Norris MD

## 2023-12-20 NOTE — ASSESSMENT & PLAN NOTE
BMI Counseling: Body mass index is 38.46 kg/m². The BMI is above normal. Nutrition recommendations include reducing portion sizes, decreasing overall calorie intake and consuming healthier snacks. Exercise recommendations include exercising 3-5 times per week.

## 2023-12-20 NOTE — ASSESSMENT & PLAN NOTE
She is planning for a total knee replacement with [facility]. This is planned on January 10, 2024.    The PAT's were reviewed and the blood work was all normal.  She will also have cardiac clearance from her cardiologist.    From my point of view as her family physician I do not see any contraindications to this planned procedure.

## 2024-02-21 ENCOUNTER — TELEPHONE (OUTPATIENT)
Dept: FAMILY MEDICINE CLINIC | Facility: MEDICAL CENTER | Age: 84
End: 2024-02-21

## 2024-02-21 NOTE — TELEPHONE ENCOUNTER
----- Message from Ernestina Serna sent at 2/21/2024  2:24 PM EST -----  Regarding: FW: PLEASE CALL DR BROWN'S OFFICE  Contact: [phone]    ----- Message -----  From: Janette April  Sent: 2/21/2024   2:21 PM EST  To: [facility] Clinical  Subject: PLEASE CALL DR BROWN'S OFFICE                   Please reschedule an appointment for me, preferably the same day as my , Rolan. Thank you

## 2024-02-23 DIAGNOSIS — E11.9 TYPE 2 DIABETES MELLITUS WITHOUT COMPLICATION, WITHOUT LONG-TERM CURRENT USE OF INSULIN (HCC): ICD-10-CM

## 2024-02-26 DIAGNOSIS — I10 ESSENTIAL HYPERTENSION: ICD-10-CM

## 2024-02-26 DIAGNOSIS — I10 HYPERTENSION, UNSPECIFIED TYPE: ICD-10-CM

## 2024-02-26 RX ORDER — RAMIPRIL 5 MG/1
5 CAPSULE ORAL DAILY
Qty: 90 CAPSULE | Refills: 1 | Status: SHIPPED | OUTPATIENT
Start: 2024-02-26

## 2024-02-26 RX ORDER — HYDROCHLOROTHIAZIDE 25 MG/1
TABLET ORAL
Qty: 90 TABLET | Refills: 1 | Status: SHIPPED | OUTPATIENT
Start: 2024-02-26

## 2024-02-27 ENCOUNTER — RA CDI HCC (OUTPATIENT)
Dept: OTHER | Facility: HOSPITAL | Age: 84
End: 2024-02-27

## 2024-02-27 NOTE — PROGRESS NOTES
HCC coding opportunities          Chart Reviewed number of suggestions sent to Provider: 2     Patients Insurance   E11.22 and E11.36  Medicare Insurance: Medicare

## 2024-03-04 ENCOUNTER — OFFICE VISIT (OUTPATIENT)
Dept: FAMILY MEDICINE CLINIC | Facility: MEDICAL CENTER | Age: 84
End: 2024-03-04
Payer: MEDICARE

## 2024-03-04 VITALS
DIASTOLIC BLOOD PRESSURE: 78 MMHG | SYSTOLIC BLOOD PRESSURE: 124 MMHG | BODY MASS INDEX: 36.84 KG/M2 | HEART RATE: 78 BPM | WEIGHT: 218 LBS | OXYGEN SATURATION: 96 % | TEMPERATURE: 97.7 F

## 2024-03-04 DIAGNOSIS — N18.31 STAGE 3A CHRONIC KIDNEY DISEASE (HCC): ICD-10-CM

## 2024-03-04 DIAGNOSIS — I10 ESSENTIAL HYPERTENSION: ICD-10-CM

## 2024-03-04 DIAGNOSIS — E03.9 ACQUIRED HYPOTHYROIDISM: ICD-10-CM

## 2024-03-04 DIAGNOSIS — E11.3293 TYPE 2 DIABETES MELLITUS WITH BOTH EYES AFFECTED BY MILD NONPROLIFERATIVE RETINOPATHY WITHOUT MACULAR EDEMA, WITHOUT LONG-TERM CURRENT USE OF INSULIN (HCC): ICD-10-CM

## 2024-03-04 DIAGNOSIS — E78.5 DYSLIPIDEMIA: ICD-10-CM

## 2024-03-04 DIAGNOSIS — M17.12 ARTHRITIS OF LEFT KNEE: Primary | ICD-10-CM

## 2024-03-04 PROCEDURE — 99214 OFFICE O/P EST MOD 30 MIN: CPT | Performed by: FAMILY MEDICINE

## 2024-03-04 PROCEDURE — G2211 COMPLEX E/M VISIT ADD ON: HCPCS | Performed by: FAMILY MEDICINE

## 2024-03-04 NOTE — ASSESSMENT & PLAN NOTE
Cholesterol is at goal with simvastatin.    Continue simvastatin, try to eat a low saturated fat diet.  Try to keep her weight down.

## 2024-03-04 NOTE — ASSESSMENT & PLAN NOTE
Blood pressure today is 124/78.  She is taking HCTZ and Altace    Continue those medications, try to eat a low-salt diet, try to stay as active as she can tolerate.

## 2024-03-04 NOTE — PROGRESS NOTES
Name: Abena Savage      : 1940      MRN: [id number]  Encounter Provider: Wesley Norris MD  Encounter Date: 3/4/2024   Encounter department: [facility]    Assessment & Plan     1. Arthritis of left knee  Assessment & Plan:  She asked for a wheelchair because ambulating has been difficult with a walker.    She is status post left TKA.    Orders:  -     Wheelchair    2. Type 2 diabetes mellitus with both eyes affected by mild nonproliferative retinopathy without macular edema, without long-term current use of insulin (HCC)  Assessment & Plan:  She is doing well with her A1c.  The only medication that she is taking for diabetes is the metformin.    Continue metformin, she needs to do more exercise but cannot because of her pain her left knee.  (S/P TKA) And she would benefit from some weight loss as well.  Continue dietary compliance.    Lab Results   Component Value Date    HGBA1C 7.0 (H) 12/15/2023       3. Stage 3a chronic kidney disease (HCC)  Assessment & Plan:  Lab Results   Component Value Date    EGFR 55 (L) 2024    EGFR 52 (L) 2024    EGFR 56 (L) 12/15/2023    CREATININE 1.02 2024    CREATININE 1.07 2024    CREATININE 1.00 12/15/2023   This has been stable.    I reemphasized the need to keep herself hydrated, avoid NSAIDs, keep her diabetes and hypertension under good control.      4. Acquired hypothyroidism  Assessment & Plan:  Her TSH has been at goal.  She is taking 50 mcg of levothyroxine.    Continue levothyroxine.      5. Essential hypertension  Assessment & Plan:  Blood pressure today is 124/78.  She is taking HCTZ and Altace    Continue those medications, try to eat a low-salt diet, try to stay as active as she can tolerate.      6. Dyslipidemia  Assessment & Plan:  Cholesterol is at goal with simvastatin.    Continue simvastatin, try to eat a low saturated fat diet.  Try to keep her weight down.             Subjective      Review of Systems    Constitutional:  Negative for chills and fever.   HENT:  Negative for ear pain, postnasal drip, rhinorrhea, sore throat and trouble swallowing.    Eyes:  Negative for pain, redness and visual disturbance.   Respiratory:  Negative for cough and shortness of breath.    Cardiovascular:  Negative for chest pain and palpitations.   Gastrointestinal:  Negative for abdominal pain, constipation, diarrhea and vomiting.   Genitourinary:  Negative for dysuria, frequency, hematuria and urgency.   Musculoskeletal:  Negative for arthralgias, back pain, joint swelling and myalgias.   Skin:  Negative for color change and rash.   Neurological:  Negative for seizures and syncope.   Hematological:  Negative for adenopathy.   Psychiatric/Behavioral:  Negative for decreased concentration, dysphoric mood and sleep disturbance. The patient is not nervous/anxious.    All other systems reviewed and are negative.      Current Outpatient Medications on File Prior to Visit   Medication Sig   • Aspirin 81 MG EC tablet Take 1 tablet by mouth daily   • hydroCHLOROthiazide 25 mg tablet TAKE 1 TABLET BY MOUTH EVERY DAY   • levothyroxine 50 mcg tablet TAKE 1 TABLET BY MOUTH EVERY DAY   • magnesium Oxide (MAG-OX) 400 mg TABS Take 1 tablet by mouth daily   • metFORMIN (GLUCOPHAGE) 850 mg tablet TAKE 1 TABLET BY MOUTH TWICE A DAY WITH BREAKFAST AND DINNER   • metoprolol succinate (TOPROL-XL) 25 mg 24 hr tablet TAKE 1 TABLET (25 MG TOTAL) BY MOUTH DAILY.   • Multiple Vitamins-Minerals (CENTRUM SILVER 50+WOMEN PO) Take 1 tablet by mouth daily    • Potassium 99 MG TABS Take 1 tablet by mouth daily   • ramipril (ALTACE) 5 mg capsule TAKE 1 CAPSULE BY MOUTH EVERY DAY   • simvastatin (ZOCOR) 10 mg tablet TAKE 1 TABLET BY MOUTH EVERY DAY   • prednisoLONE acetate (PRED FORTE) 1 % ophthalmic suspension  (Patient not taking: Reported on 12/20/2023)       Objective     /78 (BP Location: Left arm, Patient Position: Sitting, Cuff Size: Large)   Pulse 78    Temp 97.7 °F (36.5 °C) (Temporal)   Wt 98.9 kg (218 lb)   SpO2 96%   BMI 36.84 kg/m²     Physical Exam  Constitutional:       General: She is not in acute distress.     Appearance: Normal appearance. She is well-developed. She is not ill-appearing or diaphoretic.   HENT:      Head: Normocephalic and atraumatic.      Right Ear: External ear normal.      Left Ear: External ear normal.      Nose: Nose normal.      Mouth/Throat:      Mouth: Mucous membranes are moist.   Eyes:      Extraocular Movements: Extraocular movements intact.      Conjunctiva/sclera: Conjunctivae normal.      Pupils: Pupils are equal, round, and reactive to light.   Cardiovascular:      Rate and Rhythm: Normal rate and regular rhythm.      Pulses: Normal pulses.      Heart sounds: Normal heart sounds.   Pulmonary:      Effort: Pulmonary effort is normal. No respiratory distress.      Breath sounds: Normal breath sounds. No stridor.   Abdominal:      General: Abdomen is flat.   Musculoskeletal:      Cervical back: Normal range of motion and neck supple.   Skin:     General: Skin is warm and dry.   Neurological:      General: No focal deficit present.      Mental Status: She is alert and oriented to person, place, and time.   Psychiatric:         Mood and Affect: Mood normal.         Behavior: Behavior normal.         Thought Content: Thought content normal.         Judgment: Judgment normal.       Wesley Norris MD

## 2024-03-04 NOTE — ASSESSMENT & PLAN NOTE
She is doing well with her A1c.  The only medication that she is taking for diabetes is the metformin.    Continue metformin, she needs to do more exercise but cannot because of her pain her left knee.  (S/P TKA) And she would benefit from some weight loss as well.  Continue dietary compliance.    Lab Results   Component Value Date    HGBA1C 7.0 (H) 12/15/2023

## 2024-03-04 NOTE — ASSESSMENT & PLAN NOTE
Lab Results   Component Value Date    EGFR 55 (L) 02/06/2024    EGFR 52 (L) 01/11/2024    EGFR 56 (L) 12/15/2023    CREATININE 1.02 02/06/2024    CREATININE 1.07 01/11/2024    CREATININE 1.00 12/15/2023   This has been stable.    I reemphasized the need to keep herself hydrated, avoid NSAIDs, keep her diabetes and hypertension under good control.

## 2024-03-04 NOTE — ASSESSMENT & PLAN NOTE
She asked for a wheelchair because ambulating has been difficult with a walker.    She is status post left TKA.

## 2024-04-17 LAB
LEFT EYE DIABETIC RETINOPATHY: POSITIVE
RIGHT EYE DIABETIC RETINOPATHY: POSITIVE
SEVERITY (EYE EXAM): NORMAL

## 2024-04-24 DIAGNOSIS — E78.2 MIXED HYPERLIPIDEMIA: ICD-10-CM

## 2024-04-24 RX ORDER — SIMVASTATIN 10 MG
TABLET ORAL
Qty: 90 TABLET | Refills: 1 | Status: SHIPPED | OUTPATIENT
Start: 2024-04-24

## 2024-05-28 ENCOUNTER — OFFICE VISIT (OUTPATIENT)
Dept: FAMILY MEDICINE CLINIC | Facility: MEDICAL CENTER | Age: 84
End: 2024-05-28
Payer: MEDICARE

## 2024-05-28 VITALS
HEART RATE: 77 BPM | OXYGEN SATURATION: 98 % | DIASTOLIC BLOOD PRESSURE: 72 MMHG | TEMPERATURE: 97.5 F | SYSTOLIC BLOOD PRESSURE: 138 MMHG | RESPIRATION RATE: 18 BRPM | BODY MASS INDEX: 36.61 KG/M2 | WEIGHT: 216.6 LBS

## 2024-05-28 DIAGNOSIS — N18.31 STAGE 3A CHRONIC KIDNEY DISEASE (HCC): ICD-10-CM

## 2024-05-28 DIAGNOSIS — I47.10 PAROXYSMAL SVT (SUPRAVENTRICULAR TACHYCARDIA): ICD-10-CM

## 2024-05-28 DIAGNOSIS — E78.5 DYSLIPIDEMIA: ICD-10-CM

## 2024-05-28 DIAGNOSIS — E11.3293 TYPE 2 DIABETES MELLITUS WITH BOTH EYES AFFECTED BY MILD NONPROLIFERATIVE RETINOPATHY WITHOUT MACULAR EDEMA, WITHOUT LONG-TERM CURRENT USE OF INSULIN (HCC): Primary | ICD-10-CM

## 2024-05-28 DIAGNOSIS — E03.9 ACQUIRED HYPOTHYROIDISM: ICD-10-CM

## 2024-05-28 DIAGNOSIS — I10 ESSENTIAL HYPERTENSION: ICD-10-CM

## 2024-05-28 LAB — SL AMB POCT HEMOGLOBIN AIC: 6.7 (ref ?–6.5)

## 2024-05-28 PROCEDURE — 83036 HEMOGLOBIN GLYCOSYLATED A1C: CPT | Performed by: FAMILY MEDICINE

## 2024-05-28 PROCEDURE — 99214 OFFICE O/P EST MOD 30 MIN: CPT | Performed by: FAMILY MEDICINE

## 2024-05-28 NOTE — PROGRESS NOTES
Diabetic Foot Exam    Patient's shoes and socks removed.    Right Foot/Ankle   Right Foot Inspection  Skin Exam: skin normal, skin intact and dry skin. No warmth, no callus, no erythema, no maceration, no abnormal color, no pre-ulcer, no ulcer and no callus.     Toe Exam: ROM and strength within normal limits.     Sensory   Monofilament testing: diminished    Vascular  Capillary refills: < 3 seconds  The right DP pulse is 1+. The right PT pulse is 1+.     Left Foot/Ankle  Left Foot Inspection  Skin Exam: skin normal, skin intact and dry skin. No warmth, no erythema, no maceration, normal color, no pre-ulcer, no ulcer and no callus.     Toe Exam: ROM and strength within normal limits.     Sensory   Monofilament testing: diminished    Vascular  Capillary refills: < 3 seconds  The left DP pulse is 1+. The left PT pulse is 1+.     Assign Risk Category  No deformity present  Loss of protective sensation  Weak pulses  Risk: 2

## 2024-05-28 NOTE — ASSESSMENT & PLAN NOTE
Blood pressure today is 138/72.  She is taking hydrochlorothiazide and ramipril.  She is also taking metoprolol however she also takes that for SVT.    Continue those medications, try to get some exercise, low-salt diet.

## 2024-05-28 NOTE — PROGRESS NOTES
Ambulatory Visit  Name: Abena Savage      : 1940      MRN: [id number]  Encounter Provider: Wesley Norris MD  Encounter Date: 2024   Encounter department: [facility]    Assessment & Plan   1. Type 2 diabetes mellitus with both eyes affected by mild nonproliferative retinopathy without macular edema, without long-term current use of insulin (HCC)  Assessment & Plan:  She is only taking metformin.  Her A1c today is 6.7    Continue metformin, continue low-carb diet, try to lose some weight and get some exercise is much as she can tolerate it.  Lab Results   Component Value Date    HGBA1C 6.7 (A) 2024     Orders:  -     POCT hemoglobin A1c  2. Essential hypertension  Assessment & Plan:  Blood pressure today is 138/72.  She is taking hydrochlorothiazide and ramipril.  She is also taking metoprolol however she also takes that for SVT.    Continue those medications, try to get some exercise, low-salt diet.  3. Acquired hypothyroidism  Assessment & Plan:  Her TSH is at goal.  She is taking levothyroxine 50 mcg.  4. Stage 3a chronic kidney disease (HCC)  Assessment & Plan:  Lab Results   Component Value Date    EGFR 55 (L) 2024    EGFR 52 (L) 2024    EGFR 56 (L) 12/15/2023    CREATININE 1.02 2024    CREATININE 1.07 2024    CREATININE 1.00 12/15/2023     Continue paying attention to her hydration.  Avoid NSAIDs.  Keep control of her blood pressure and diabetes.  5. Dyslipidemia  Assessment & Plan:  Cholesterol is at goal with simvastatin.    Continue simvastatin, try to eat a low saturated fat diet.  Try to keep her weight down.         History of Present Illness       Review of Systems   Constitutional: Negative.    HENT: Negative.     Eyes: Negative.    Respiratory: Negative.     Cardiovascular: Negative.    Gastrointestinal: Negative.    Genitourinary: Negative.    Musculoskeletal: Negative.    Neurological: Negative.      Past Medical History:   Diagnosis  Date   • Chronic kidney disease     Stage III   • Diabetes mellitus (HCC)    • Disease of thyroid gland    • Hyperlipidemia    • Hypertension    • Irregular heart beat    • LBBB (left bundle branch block)    • Palpitations    • SVT (supraventricular tachycardia) 2019    Resolved Post Ablation     Past Surgical History:   Procedure Laterality Date   • APPENDECTOMY     • CARDIAC ELECTROPHYSIOLOGY MAPPING AND ABLATION  2019   • CARPAL TUNNEL RELEASE Right    •  SECTION     • HERNIA REPAIR     • JOINT REPLACEMENT Right     Knee   • JOINT REPLACEMENT Right     Hip   • KNEE ARTHROSCOPY Right     therapeutic    • KNEE SURGERY Left     Cartilage repair   • THYROID LOBECTOMY Left 2021    Procedure: HEMITHYROIDECTOMY;  Surgeon: Andrea Lucio MD;  Location: [facility];  Service: Surgical Oncology   • TONSILLECTOMY     • US GUIDED THYROID BIOPSY  2021     Family History   Problem Relation Age of Onset   • Arthritis Mother    • Other Mother         CABG   • Cancer Mother    • Heart disease Mother    • Osteoporosis Mother    • Lung cancer Father    • Pancreatic cancer Father      Social History     Tobacco Use   • Smoking status: Former     Current packs/day: 0.00     Types: Cigarettes     Quit date:      Years since quittin.4   • Smokeless tobacco: Never   Vaping Use   • Vaping status: Never Used   Substance and Sexual Activity   • Alcohol use: Not Currently     Alcohol/week: 0.0 standard drinks of alcohol   • Drug use: Never   • Sexual activity: Not on file     Current Outpatient Medications on File Prior to Visit   Medication Sig   • Aspirin 81 MG EC tablet Take 1 tablet by mouth daily   • hydroCHLOROthiazide 25 mg tablet TAKE 1 TABLET BY MOUTH EVERY DAY   • levothyroxine 50 mcg tablet TAKE 1 TABLET BY MOUTH EVERY DAY   • magnesium Oxide (MAG-OX) 400 mg TABS Take 1 tablet by mouth daily   • metFORMIN (GLUCOPHAGE) 850 mg tablet TAKE 1 TABLET BY MOUTH TWICE A DAY WITH BREAKFAST AND DINNER   •  metoprolol succinate (TOPROL-XL) 25 mg 24 hr tablet TAKE 1 TABLET (25 MG TOTAL) BY MOUTH DAILY.   • Multiple Vitamins-Minerals (CENTRUM SILVER 50+WOMEN PO) Take 1 tablet by mouth daily    • Potassium 99 MG TABS Take 1 tablet by mouth daily   • ramipril (ALTACE) 5 mg capsule TAKE 1 CAPSULE BY MOUTH EVERY DAY   • simvastatin (ZOCOR) 10 mg tablet TAKE 1 TABLET BY MOUTH EVERY DAY   • prednisoLONE acetate (PRED FORTE) 1 % ophthalmic suspension  (Patient not taking: Reported on 12/20/2023)     Allergies   Allergen Reactions   • Proparacaine Itching     Immunization History   Administered Date(s) Administered   • COVID-19 PFIZER VACCINE 0.3 ML IM 01/22/2021, 02/12/2021, 01/14/2022   • Hep A, adult 01/28/2015, 07/28/2015   • INFLUENZA 10/27/2018, 10/11/2019, 09/05/2020, 09/25/2021, 10/04/2022, 10/02/2023   • Influenza, seasonal, injectable 11/15/2010, 10/16/2015   • Pneumococcal Conjugate 13-Valent 07/18/2017   • Pneumococcal Polysaccharide PPV23 07/12/2016   • Zoster Vaccine Recombinant 09/08/2020, 03/15/2021     Objective     /72 (BP Location: Left arm, Patient Position: Sitting, Cuff Size: Large)   Pulse 77   Temp 97.5 °F (36.4 °C) (Temporal)   Resp 18   Wt 98.2 kg (216 lb 9.6 oz)   SpO2 98%   BMI 36.61 kg/m²     Physical Exam  Constitutional:       General: She is not in acute distress.     Appearance: Normal appearance. She is well-developed. She is not diaphoretic.   HENT:      Head: Normocephalic.      Right Ear: External ear normal.      Left Ear: External ear normal.      Nose: Nose normal.      Mouth/Throat:      Mouth: Mucous membranes are moist.      Pharynx: No oropharyngeal exudate or posterior oropharyngeal erythema.   Eyes:      Extraocular Movements: Extraocular movements intact.      Pupils: Pupils are equal, round, and reactive to light.   Cardiovascular:      Rate and Rhythm: Regular rhythm.      Heart sounds: Normal heart sounds.   Pulmonary:      Effort: Pulmonary effort is normal. No  respiratory distress.      Breath sounds: Normal breath sounds. No stridor.   Musculoskeletal:         General: Normal range of motion.      Cervical back: Normal range of motion.   Skin:     General: Skin is warm and dry.   Neurological:      General: No focal deficit present.      Mental Status: She is alert and oriented to person, place, and time.   Psychiatric:         Mood and Affect: Mood normal.         Behavior: Behavior normal.         Thought Content: Thought content normal.         Judgment: Judgment normal.       Administrative Statements

## 2024-05-28 NOTE — ASSESSMENT & PLAN NOTE
She is only taking metformin.  Her A1c today is 6.7    Continue metformin, continue low-carb diet, try to lose some weight and get some exercise is much as she can tolerate it.  Lab Results   Component Value Date    HGBA1C 6.7 (A) 05/28/2024

## 2024-05-28 NOTE — ASSESSMENT & PLAN NOTE
Lab Results   Component Value Date    EGFR 55 (L) 02/06/2024    EGFR 52 (L) 01/11/2024    EGFR 56 (L) 12/15/2023    CREATININE 1.02 02/06/2024    CREATININE 1.07 01/11/2024    CREATININE 1.00 12/15/2023     Continue paying attention to her hydration.  Avoid NSAIDs.  Keep control of her blood pressure and diabetes.

## 2024-05-29 ENCOUNTER — TELEPHONE (OUTPATIENT)
Dept: ADMINISTRATIVE | Facility: OTHER | Age: 84
End: 2024-05-29

## 2024-05-29 RX ORDER — METOPROLOL SUCCINATE 25 MG/1
25 TABLET, EXTENDED RELEASE ORAL DAILY
Qty: 90 TABLET | Refills: 1 | Status: SHIPPED | OUTPATIENT
Start: 2024-05-29

## 2024-05-29 NOTE — LETTER
Diabetic Eye Exam Form    Date Requested: 24  Patient: Abena Savage  Patient : 1940   Referring Provider: Wesley Norris MD      DIABETIC Eye Exam Date _______________________________      Type of Exam MUST be documented for Diabetic Eye Exams. Please CHECK ONE.     Retinal Exam       Dilated Retinal Exam       OCT       Optomap-Iris Exam      Fundus Photography       Left Eye - Please check Retinopathy or No Retinopathy        Exam did show retinopathy    Exam did not show retinopathy       Right Eye - Please check Retinopathy or No Retinopathy       Exam did show retinopathy    Exam did not show retinopathy       Comments __________________________________________________________    Practice Providing Exam ______________________________________________    Exam Performed By (print name) _______________________________________      Provider Signature ___________________________________________________      These reports are needed for  compliance.  Please fax this completed form and a copy of the Diabetic Eye Exam report to our office located at [address] [place] [address] as soon as possible via Fax [phone] attention Eileen: Phone [phone]  We thank you for your assistance in treating our mutual patient.

## 2024-05-29 NOTE — TELEPHONE ENCOUNTER
----- Message from Candice MORGAN sent at 5/28/2024  1:26 PM EDT -----  Regarding: Care Gap Request  05/28/24 1:26 PM    Hello, our patient above has had Diabetic Eye Exam completed/performed. Please assist in updating the patient chart by making an External outreach to [facility] facility located in [place], [place]. The date of service is within the last 6 months.    Thank you,  Candice Coronado [facility]ORAIDA PEREZ

## 2024-06-14 NOTE — TELEPHONE ENCOUNTER
Upon review of the In Basket request we were able to locate, review, and update the patient chart as requested for Diabetic Eye Exam.    Any additional questions or concerns should be emailed to the Practice Liaisons via the appropriate education email address, please do not reply via In Basket.    Thank you  Eileen Sharma MA   [facility]

## 2024-06-14 NOTE — TELEPHONE ENCOUNTER
Upon review of the In Basket request and the patient's chart, initial outreach has been made via fax to facility. Please see Contacts section for details.     Thank you  Eileen Sharma MA

## 2024-07-01 ENCOUNTER — HOSPITAL ENCOUNTER (OUTPATIENT)
Dept: ULTRASOUND IMAGING | Facility: CLINIC | Age: 84
Discharge: HOME | End: 2024-07-01
Payer: MEDICARE

## 2024-07-01 DIAGNOSIS — E04.2 MULTIPLE THYROID NODULES: ICD-10-CM

## 2024-07-01 PROCEDURE — 76536 US EXAM OF HEAD AND NECK: CPT

## 2024-07-02 ENCOUNTER — RA CDI HCC (OUTPATIENT)
Dept: OTHER | Facility: HOSPITAL | Age: 84
End: 2024-07-02

## 2024-07-02 PROBLEM — Z01.810 PREOP CARDIOVASCULAR EXAM: Status: RESOLVED | Noted: 2023-12-06 | Resolved: 2024-07-02

## 2024-07-03 DIAGNOSIS — E03.9 HYPOTHYROIDISM, UNSPECIFIED TYPE: ICD-10-CM

## 2024-07-03 RX ORDER — LEVOTHYROXINE SODIUM 0.05 MG/1
TABLET ORAL
Qty: 90 TABLET | Refills: 1 | Status: SHIPPED | OUTPATIENT
Start: 2024-07-03

## 2024-07-08 ENCOUNTER — OFFICE VISIT (OUTPATIENT)
Dept: SURGICAL ONCOLOGY | Facility: CLINIC | Age: 84
End: 2024-07-08
Payer: MEDICARE

## 2024-07-08 ENCOUNTER — TELEPHONE (OUTPATIENT)
Dept: SURGICAL ONCOLOGY | Facility: CLINIC | Age: 84
End: 2024-07-08

## 2024-07-08 VITALS
HEART RATE: 78 BPM | HEIGHT: 65 IN | OXYGEN SATURATION: 98 % | DIASTOLIC BLOOD PRESSURE: 72 MMHG | RESPIRATION RATE: 18 BRPM | WEIGHT: 216.8 LBS | SYSTOLIC BLOOD PRESSURE: 130 MMHG | BODY MASS INDEX: 36.12 KG/M2

## 2024-07-08 DIAGNOSIS — E04.2 MULTIPLE THYROID NODULES: Primary | ICD-10-CM

## 2024-07-08 PROCEDURE — 99213 OFFICE O/P EST LOW 20 MIN: CPT

## 2024-07-08 PROCEDURE — G2211 COMPLEX E/M VISIT ADD ON: HCPCS

## 2024-07-08 NOTE — LETTER
2024     Andrea Lucio MD  [address]  2nd Floor  [facility] [address]    Patient: Abena Savage   YOB: 1940   Date of Visit: 2024       Dear Dr. Lucio:    Thank you for referring Abena Savage to me for evaluation. Below are my notes for this consultation.    If you have questions, please do not hesitate to call me. I look forward to following your patient along with you.         Sincerely,        CHARIS Goldstein        CC: No Recipients    CHARIS Goldstein  2024  1:45 PM  Sign when Signing Visit               Surgical Oncology Follow Up       [facility] [place]  [address]  [address]  [facility] [place] [address]  [phone]    Abena Savage  1940  [id number]  [facility] [place]  [address]  [address]  [facility] [address]  [phone]    1. Multiple thyroid nodules  Assessment & Plan:  Right-sided nodules are stable on US.  Given the size, we will repeat one more time in 1 year.  Orders:  -     US thyroid; Future; Expected date: 2025         Chief Complaint   Patient presents with   • Follow-up     Multiple thyroid nodules       Return in about 1 year (around 2025) for Office Visit, Imaging - See orders.      Stagin.9cm left midgland and 2.8cm right midgland nodules biopsied 2021, both [place] II (benign)  Treatment history:  Left thyroid lobectomy, 2021  Current treatment:  Right lobe observation  Disease status: KERI    History of Present Illness: This is an 85 y/o female who presents today for thyroid nodule surveillance s/p left hemithyroidectomy.  She denies any voice changes or new lumps in her neck. She has not had any recent thyroid function studies but is scheduled to see a new PCP tomorrow.  Thyroid US was performed on .  I have reviewed these results with the  patient and her  today.      Review of Systems   Constitutional:  Negative for activity change, appetite change and fatigue.   HENT: Negative.  Negative for trouble swallowing and voice change.    Respiratory: Negative.     Cardiovascular: Negative.    Gastrointestinal: Negative.    Musculoskeletal:  Positive for arthralgias.   Skin: Negative.    Neurological: Negative.    Hematological: Negative.  Negative for adenopathy.   Psychiatric/Behavioral: Negative.               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)   • Morbid obesity with BMI of 40.0-44.9, adult (HCC)   • Acquired hypothyroidism   • Stage 3 chronic kidney disease (HCC)   • Multiple thyroid nodules   • S/P partial thyroidectomy   • Cortical age-related cataract of both eyes   • Arthritis of left knee     Past Medical History:   Diagnosis Date   • Chronic kidney disease     Stage III   • Diabetes mellitus (HCC)    • Disease of thyroid gland    • Hyperlipidemia    • Hypertension    • Irregular heart beat    • LBBB (left bundle branch block)    • Palpitations    • Preop cardiovascular exam 2023   • SVT (supraventricular tachycardia) 2019    Resolved Post Ablation     Past Surgical History:   Procedure Laterality Date   • APPENDECTOMY     • CARDIAC ELECTROPHYSIOLOGY MAPPING AND ABLATION  2019   • CARPAL TUNNEL RELEASE Right    •  SECTION     • HERNIA REPAIR     • JOINT REPLACEMENT Right     Knee   • JOINT REPLACEMENT Right     Hip   • KNEE ARTHROSCOPY Right     therapeutic    • KNEE SURGERY Left     Cartilage repair   • THYROID LOBECTOMY Left 2021    Procedure: HEMITHYROIDECTOMY;  Surgeon: Andrea Lucio MD;  [facility];  Service: Surgical Oncology   • TONSILLECTOMY     • US GUIDED THYROID BIOPSY   2021     Family History   Problem Relation Age of Onset   • Arthritis Mother    • Other Mother         CABG   • Cancer Mother    • Heart disease Mother    • Osteoporosis Mother    • Lung cancer Father    • Pancreatic cancer Father      Social History     Socioeconomic History   • Marital status: /Civil Union     Spouse name: Not on file   • Number of children: Not on file   • Years of education: Not on file   • Highest education level: Not on file   Occupational History   • Not on file   Tobacco Use   • Smoking status: Former     Current packs/day: 0.00     Types: Cigarettes     Quit date:      Years since quittin.5   • Smokeless tobacco: Never   Vaping Use   • Vaping status: Never Used   Substance and Sexual Activity   • Alcohol use: Not Currently     Alcohol/week: 0.0 standard drinks of alcohol   • Drug use: Never   • Sexual activity: Not on file   Other Topics Concern   • Not on file   Social History Narrative    Caffeine use      Social Determinants of Health     Financial Resource Strain: Low Risk  (1/10/2024)    [facility]    Overall Financial Resource Strain (CARDIA)    • Difficulty of Paying Living Expenses: Not hard at all   Food Insecurity: No Food Insecurity (1/10/2024)    Received from [facility]    Hunger Vital Sign    • Worried About Running Out of Food in the Last Year: Never true    • Ran Out of Food in the Last Year: Never true   Transportation Needs: No Transportation Needs (1/10/2024)    [facility]    PRAPARE - Transportation    • Lack of Transportation (Medical): No    • Lack of Transportation (Non-Medical): No   Physical Activity: Not on file   Stress: Not on file   Social Connections: Not on file   Intimate Partner Violence: Not At Risk (1/10/2024)    [facility]    Humiliation, Afraid, Rape, and Kick questionnaire    • Fear of Current or Ex-Partner: No    • Emotionally Abused: No    • Physically Abused: No    • Sexually Abused: No   Housing Stability: Low Risk  (1/10/2024)    [facility]    Housing Stability Vital Sign    • Unable to Pay for Housing in the Last Year: No    • Number of Places Lived in the Last Year: 1    • Unstable Housing in the Last Year: No       Current Outpatient Medications:   •  Aspirin 81 MG EC tablet, Take 1 tablet by mouth daily, Disp: , Rfl:   •  hydroCHLOROthiazide 25 mg tablet, TAKE 1 TABLET BY MOUTH EVERY DAY, Disp: 90 tablet, Rfl: 1  •  levothyroxine 50 mcg tablet, TAKE 1 TABLET BY MOUTH EVERY DAY, Disp: 90 tablet, Rfl: 1  •  magnesium Oxide (MAG-OX) 400 mg TABS, Take 1 tablet by mouth daily, Disp: , Rfl:   •  metFORMIN (GLUCOPHAGE) 850 mg tablet, TAKE 1 TABLET BY MOUTH TWICE A DAY WITH BREAKFAST AND DINNER, Disp: 180 tablet, Rfl: 1  •  metoprolol succinate (TOPROL-XL) 25 mg 24 hr tablet, TAKE 1 TABLET (25 MG TOTAL) BY MOUTH DAILY., Disp: 90 tablet, Rfl: 1  •  Multiple Vitamins-Minerals (CENTRUM SILVER 50+WOMEN PO), Take 1 tablet by mouth daily , Disp: , Rfl:   •  Potassium 99 MG TABS, Take 1 tablet by mouth daily, Disp: , Rfl:   •  ramipril (ALTACE) 5 mg capsule, TAKE 1 CAPSULE BY MOUTH EVERY DAY, Disp: 90 capsule, Rfl: 1  •  simvastatin (ZOCOR) 10 mg tablet, TAKE 1 TABLET BY MOUTH EVERY DAY, Disp: 90 tablet, Rfl: 1  •  prednisoLONE acetate (PRED FORTE) 1 % ophthalmic suspension, , Disp: , Rfl:   Allergies   Allergen Reactions   • Proparacaine Itching     Vitals:    07/08/24 1323   BP: 130/72   Pulse: 78   Resp: 18   SpO2: 98%       Physical Exam  Vitals reviewed.   Constitutional:       General: She is not in acute distress.     Appearance: Normal appearance. She is not ill-appearing or toxic-appearing.   HENT:      Head: Normocephalic and atraumatic.       Nose: Nose normal.      Mouth/Throat:      Mouth: Mucous membranes are moist.   Eyes:      General: No scleral icterus.  Neck:      Thyroid: No thyroid mass or thyroid tenderness.      Comments: Right-sided thyroid gland is nodular, firm, mobile.  No adenopathy present.  Cardiovascular:      Rate and Rhythm: Normal rate.   Pulmonary:      Effort: Pulmonary effort is normal.   Musculoskeletal:      Cervical back: Normal range of motion and neck supple.   Lymphadenopathy:      Head:      Right side of head: No submandibular adenopathy.      Left side of head: No submandibular adenopathy.      Cervical: No cervical adenopathy.      Upper Body:      Right upper body: No supraclavicular adenopathy.      Left upper body: No supraclavicular adenopathy.   Skin:     General: Skin is warm and dry.   Neurological:      General: No focal deficit present.      Mental Status: She is alert and oriented to person, place, and time.   Psychiatric:         Mood and Affect: Mood normal.         Behavior: Behavior normal.         Thought Content: Thought content normal.         Judgment: Judgment normal.               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 Status post left thyroidectomy. COMPARISON: 6/20/2023 TECHNIQUE: Ultrasound of the thyroid was performed with a high frequency linear transducer in transverse and sagittal planes including volumetric imaging sweeps as well as traditional still imaging technique. FINDINGS: Thyroid texture: Thyroid parenchyma is diffusely heterogeneous in echotexture with focal nodule(s) as described below. Right lobe: 6.3 x 2.8 x 3.2 cm. Volume 26.9 mL Left lobe: Absent Isthmus: 0.3 cm. Nodule #1. Image 12. RIGHT lower pole nodule measuring 3.4 x 2.0 x 2.9 cm. Earlier 3.4 x 1.7 x 2.7 cm COMPOSITION: 2 points, solid or almost completely solid. ECHOGENICITY: 1 point, hyperechoic or isoechoic. SHAPE: 0 points,  wider-than-tall. MARGIN: 0 points, smooth. ECHOGENIC FOCI: 0 points, none or large comet-tail artifacts. TI-RADS Classification: TR 3 (3 points), FNA if >2.5 cm.  Follow if >1.5 cm. This nodule was biopsied in April 2021 with benign result. Nodule #2. Image 16. RIGHT lower pole nodule measuring 1.5 x 1.7 x 1.6 cm. Earlier 1.9 x 1.7 x 1.9 cm COMPOSITION: 2 points, solid or almost completely solid. ECHOGENICITY: 1 point, hyperechoic or isoechoic. SHAPE: 0 points, wider-than-tall. (Appearing slightly taller than wide on today's examination this may be related to technique. Overall, not felt significantly changed from prior) MARGIN: 0 points, ill-defined. ECHOGENIC FOCI: 0 points, none or large comet-tail artifacts. TI-RADS Classification: TR 3 (3 points), FNA if >2.5 cm.  Follow if >1.5 cm. Nodule #3. Image 17. Questionable nodule versus heterogeneous tissue anteriorly is unchanged from 6/20/2022 There are additional nodules of lesser size and/or TI-RADS score.  At the time of follow-up for the above described dominant nodules, these will be reevaluated in detailed at that time if needed.     Impression: Status post left thyroidectomy with diffusely heterogeneous and nodular right thyroid lobe, without nodules at this time requiring fine-needle aspiration. Reassessment ultrasound may be considered in 1 to 2 years time Reference: ACR Thyroid Imaging, Reporting and Data System (TI-RADS): White Paper of the ACR TI-RADS Committee. J AM Sophia Radiol 2017;14:587-595. Additional recommendations based on American Thyroid Association 2015 guidelines. Workstation performed: [id number]     I personally reviewed and interpreted the above laboratory and imaging data.

## 2024-07-08 NOTE — TELEPHONE ENCOUNTER
Called patient, but there was no option to leave voice mail. Called spouse, left voice message for patient to return call at office [phone] to schedule follow up visit with CHARIS Ospina.

## 2024-07-08 NOTE — PROGRESS NOTES
Surgical Oncology Follow Up       [facility] [place]  [facility] [place]  [address]  [address]  [place] [address]  [phone]    April Tyler  1940  [id number]  [facility] [place]  [facility] [place]  [address]  [address]  [place] [address]  [phone]    1. Multiple thyroid nodules  Assessment & Plan:  Right-sided nodules are stable on US.  Given the size, we will repeat one more time in 1 year.  Orders:  -     US thyroid; Future; Expected date: 2025         Chief Complaint   Patient presents with    Follow-up     Multiple thyroid nodules       Return in about 1 year (around 2025) for Office Visit, Imaging - See orders.      Stagin.9cm left midgland and 2.8cm right midgland nodules biopsied 2021, both [place] II (benign)  Treatment history:  Left thyroid lobectomy, 2021  Current treatment:  Right lobe observation  Disease status: KERI    History of Present Illness: This is an 85 y/o female who presents today for thyroid nodule surveillance s/p left hemithyroidectomy.  She denies any voice changes or new lumps in her neck. She has not had any recent thyroid function studies but is scheduled to see a new PCP tomorrow.  Thyroid US was performed on .  I have reviewed these results with the patient and her  today.      Review of Systems   Constitutional:  Negative for activity change, appetite change and fatigue.   HENT: Negative.  Negative for trouble swallowing and voice change.    Respiratory: Negative.     Cardiovascular: Negative.    Gastrointestinal: Negative.    Musculoskeletal:  Positive for arthralgias.   Skin: Negative.    Neurological: Negative.    Hematological: Negative.  Negative for adenopathy.   Psychiatric/Behavioral: Negative.               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)    Morbid obesity with BMI of 40.0-44.9, adult (HCC)    Acquired hypothyroidism    Stage 3 chronic kidney disease (HCC)    Multiple thyroid nodules    S/P partial thyroidectomy    Cortical age-related cataract of both eyes    Arthritis of left knee     Past Medical History:   Diagnosis Date    Chronic kidney disease     Stage III    Diabetes mellitus (HCC)     Disease of thyroid gland     Hyperlipidemia     Hypertension     Irregular heart beat     LBBB (left bundle branch block)     Palpitations     Preop cardiovascular exam 2023    SVT (supraventricular tachycardia) 2019    Resolved Post Ablation     Past Surgical History:   Procedure Laterality Date    APPENDECTOMY      CARDIAC ELECTROPHYSIOLOGY MAPPING AND ABLATION  2019    CARPAL TUNNEL RELEASE Right      SECTION      HERNIA REPAIR      JOINT REPLACEMENT Right     Knee    JOINT REPLACEMENT Right     Hip    KNEE ARTHROSCOPY Right     therapeutic     KNEE SURGERY Left     Cartilage repair    THYROID LOBECTOMY Left 2021    Procedure: HEMITHYROIDECTOMY;  Surgeon: Andrea Lucio MD;  [facility];  Service: Surgical Oncology    TONSILLECTOMY      US GUIDED THYROID BIOPSY  2021     Family History   Problem Relation Age of Onset    Arthritis Mother     Other Mother         CABG    Cancer Mother     Heart disease Mother     Osteoporosis Mother     Lung cancer Father     Pancreatic cancer Father      Social History     Socioeconomic History    Marital status: /Civil Union     Spouse name: Not on file    Number of children: Not on file    Years of education: Not on file    Highest education level: Not on file   Occupational History    Not on file   Tobacco Use    Smoking status: Former     Current packs/day: 0.00     Types: Cigarettes      Quit date:      Years since quittin.5    Smokeless tobacco: Never   Vaping Use    Vaping status: Never Used   Substance and Sexual Activity    Alcohol use: Not Currently     Alcohol/week: 0.0 standard drinks of alcohol    Drug use: Never    Sexual activity: Not on file   Other Topics Concern    Not on file   Social History Narrative    Caffeine use      Social Determinants of Health     Financial Resource Strain: Low Risk  (1/10/2024)    [facility]    Overall Financial Resource Strain (CARDIA)     Difficulty of Paying Living Expenses: Not hard at all   Food Insecurity: No Food Insecurity (1/10/2024)    [facility]    Hunger Vital Sign     Worried About Running Out of Food in the Last Year: Never true     Ran Out of Food in the Last Year: Never true   Transportation Needs: No Transportation Needs (1/10/2024)    [facility]    PRAPARE - Transportation     Lack of Transportation (Medical): No     Lack of Transportation (Non-Medical): No   Physical Activity: Not on file   Stress: Not on file   Social Connections: Not on file   Intimate Partner Violence: Not At Risk (1/10/2024)    [facility]    Humiliation, Afraid, Rape, and Kick questionnaire     Fear of Current or Ex-Partner: No     Emotionally Abused: No     Physically Abused: No     Sexually Abused: No   Housing Stability: Low Risk  (1/10/2024)    [facility]    Housing Stability Vital Sign     Unable to Pay for Housing in the Last Year: No     Number of Places Lived in the Last Year: 1     Unstable Housing in the Last Year: No       Current Outpatient Medications:     Aspirin 81 MG EC tablet, Take 1 tablet by mouth daily, Disp: , Rfl:      hydroCHLOROthiazide 25 mg tablet, TAKE 1 TABLET BY MOUTH EVERY DAY, Disp: 90 tablet, Rfl: 1    levothyroxine 50 mcg tablet, TAKE 1 TABLET BY MOUTH EVERY DAY, Disp: 90 tablet, Rfl: 1    magnesium Oxide (MAG-OX) 400 mg TABS, Take 1 tablet by mouth daily, Disp: , Rfl:     metFORMIN (GLUCOPHAGE) 850 mg tablet, TAKE 1 TABLET BY MOUTH TWICE A DAY WITH BREAKFAST AND DINNER, Disp: 180 tablet, Rfl: 1    metoprolol succinate (TOPROL-XL) 25 mg 24 hr tablet, TAKE 1 TABLET (25 MG TOTAL) BY MOUTH DAILY., Disp: 90 tablet, Rfl: 1    Multiple Vitamins-Minerals (CENTRUM SILVER 50+WOMEN PO), Take 1 tablet by mouth daily , Disp: , Rfl:     Potassium 99 MG TABS, Take 1 tablet by mouth daily, Disp: , Rfl:     ramipril (ALTACE) 5 mg capsule, TAKE 1 CAPSULE BY MOUTH EVERY DAY, Disp: 90 capsule, Rfl: 1    simvastatin (ZOCOR) 10 mg tablet, TAKE 1 TABLET BY MOUTH EVERY DAY, Disp: 90 tablet, Rfl: 1    prednisoLONE acetate (PRED FORTE) 1 % ophthalmic suspension, , Disp: , Rfl:   Allergies   Allergen Reactions    Proparacaine Itching     Vitals:    07/08/24 1323   BP: 130/72   Pulse: 78   Resp: 18   SpO2: 98%       Physical Exam  Vitals reviewed.   Constitutional:       General: She is not in acute distress.     Appearance: Normal appearance. She is not ill-appearing or toxic-appearing.   HENT:      Head: Normocephalic and atraumatic.      Nose: Nose normal.      Mouth/Throat:      Mouth: Mucous membranes are moist.   Eyes:      General: No scleral icterus.  Neck:      Thyroid: No thyroid mass or thyroid tenderness.      Comments: Right-sided thyroid gland is nodular, firm, mobile.  No adenopathy present.  Cardiovascular:      Rate and Rhythm: Normal rate.   Pulmonary:      Effort: Pulmonary effort is normal.   Musculoskeletal:      Cervical back: Normal range of motion and neck supple.   Lymphadenopathy:      Head:      Right side of head: No submandibular adenopathy.      Left side of head: No submandibular adenopathy.      Cervical: No  cervical adenopathy.      Upper Body:      Right upper body: No supraclavicular adenopathy.      Left upper body: No supraclavicular adenopathy.   Skin:     General: Skin is warm and dry.   Neurological:      General: No focal deficit present.      Mental Status: She is alert and oriented to person, place, and time.   Psychiatric:         Mood and Affect: Mood normal.         Behavior: Behavior normal.         Thought Content: Thought content normal.         Judgment: Judgment normal.               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 Status post left thyroidectomy. COMPARISON: 6/20/2023 TECHNIQUE: Ultrasound of the thyroid was performed with a high frequency linear transducer in transverse and sagittal planes including volumetric imaging sweeps as well as traditional still imaging technique. FINDINGS: Thyroid texture: Thyroid parenchyma is diffusely heterogeneous in echotexture with focal nodule(s) as described below. Right lobe: 6.3 x 2.8 x 3.2 cm. Volume 26.9 mL Left lobe: Absent Isthmus: 0.3 cm. Nodule #1. Image 12. RIGHT lower pole nodule measuring 3.4 x 2.0 x 2.9 cm. Earlier 3.4 x 1.7 x 2.7 cm COMPOSITION: 2 points, solid or almost completely solid. ECHOGENICITY: 1 point, hyperechoic or isoechoic. SHAPE: 0 points, wider-than-tall. MARGIN: 0 points, smooth. ECHOGENIC FOCI: 0 points, none or large comet-tail artifacts. TI-RADS Classification: TR 3 (3 points), FNA if >2.5 cm.  Follow if >1.5 cm. This nodule was biopsied in April 2021 with benign result. Nodule #2. Image 16. RIGHT lower pole nodule measuring 1.5 x 1.7 x 1.6 cm. Earlier 1.9 x 1.7 x 1.9 cm COMPOSITION: 2 points, solid or almost completely solid. ECHOGENICITY: 1 point, hyperechoic or isoechoic. SHAPE: 0 points, wider-than-tall. (Appearing slightly taller than wide on today's examination this may be related to technique. Overall, not felt significantly changed from  prior) MARGIN: 0 points, ill-defined. ECHOGENIC FOCI: 0 points, none or large comet-tail artifacts. TI-RADS Classification: TR 3 (3 points), FNA if >2.5 cm.  Follow if >1.5 cm. Nodule #3. Image 17. Questionable nodule versus heterogeneous tissue anteriorly is unchanged from 6/20/2022 There are additional nodules of lesser size and/or TI-RADS score.  At the time of follow-up for the above described dominant nodules, these will be reevaluated in detailed at that time if needed.     Impression: Status post left thyroidectomy with diffusely heterogeneous and nodular right thyroid lobe, without nodules at this time requiring fine-needle aspiration. Reassessment ultrasound may be considered in 1 to 2 years time Reference: ACR Thyroid Imaging, Reporting and Data System (TI-RADS): White Paper of the ACR TI-RADS Committee. J AM Sophia Radiol 2017;14:587-595. Additional recommendations based on American Thyroid Association 2015 guidelines. Workstation performed: [id number]     I personally reviewed and interpreted the above laboratory and imaging data.

## 2024-07-09 ENCOUNTER — OFFICE VISIT (OUTPATIENT)
Age: 84
End: 2024-07-09
Payer: MEDICARE

## 2024-07-09 VITALS
OXYGEN SATURATION: 95 % | WEIGHT: 215.4 LBS | DIASTOLIC BLOOD PRESSURE: 62 MMHG | HEART RATE: 73 BPM | HEIGHT: 65 IN | RESPIRATION RATE: 18 BRPM | TEMPERATURE: 97.5 F | SYSTOLIC BLOOD PRESSURE: 144 MMHG | BODY MASS INDEX: 35.89 KG/M2

## 2024-07-09 DIAGNOSIS — E78.5 DYSLIPIDEMIA: ICD-10-CM

## 2024-07-09 DIAGNOSIS — I47.10 SVT (SUPRAVENTRICULAR TACHYCARDIA): ICD-10-CM

## 2024-07-09 DIAGNOSIS — E03.9 ACQUIRED HYPOTHYROIDISM: ICD-10-CM

## 2024-07-09 DIAGNOSIS — E11.3293 TYPE 2 DIABETES MELLITUS WITH BOTH EYES AFFECTED BY MILD NONPROLIFERATIVE RETINOPATHY WITHOUT MACULAR EDEMA, WITHOUT LONG-TERM CURRENT USE OF INSULIN (HCC): Primary | ICD-10-CM

## 2024-07-09 DIAGNOSIS — I10 ESSENTIAL HYPERTENSION: ICD-10-CM

## 2024-07-09 DIAGNOSIS — E66.01 MORBID OBESITY WITH BMI OF 40.0-44.9, ADULT (HCC): ICD-10-CM

## 2024-07-09 DIAGNOSIS — E89.0 S/P PARTIAL THYROIDECTOMY: ICD-10-CM

## 2024-07-09 DIAGNOSIS — Z78.0 POST-MENOPAUSAL: ICD-10-CM

## 2024-07-09 DIAGNOSIS — N18.31 STAGE 3A CHRONIC KIDNEY DISEASE (HCC): ICD-10-CM

## 2024-07-09 PROCEDURE — 99214 OFFICE O/P EST MOD 30 MIN: CPT | Performed by: FAMILY MEDICINE

## 2024-07-09 PROCEDURE — G2211 COMPLEX E/M VISIT ADD ON: HCPCS | Performed by: FAMILY MEDICINE

## 2024-07-09 RX ORDER — METFORMIN HYDROCHLORIDE 500 MG/1
500 TABLET, EXTENDED RELEASE ORAL
Qty: 90 TABLET | Refills: 1 | Status: SHIPPED | OUTPATIENT
Start: 2024-07-09

## 2024-07-09 NOTE — PROGRESS NOTES
Ambulatory Visit  Name: Abena Savage      : 1940      MRN: [id number]  Encounter Provider: Muna Yoder DO  Encounter Date: 2024   Encounter department: [facility] [place]    Assessment & Plan   1. Type 2 diabetes mellitus with both eyes affected by mild nonproliferative retinopathy without macular edema, without long-term current use of insulin (HCC)- controlled with an A1c of 6.7   -     metFORMIN (GLUCOPHAGE-XR) 500 mg 24 hr tablet; Take 1 tablet (500 mg total) by mouth daily with dinner  2. Essential hypertension- controlled with ctz 25mg qd, metoprolol 25mg qd, and ramipril 5mg qd.   -     Basic metabolic panel; Future  3. SVT (supraventricular tachycardia)-s/p ablation 2019.   4. Morbid obesity with BMI of 40.0-44.9, adult (HCC)- lifestyle modification reviewed with pt   5. Acquired hypothyroidism- TSH from a year ago wnl. Has been on levothyroxine 50mcg qd for years. Will continue to monitor and titrate dose accordingly   -     TSH, 3rd generation with Free T4 reflex; Future; Expected date: 2024  6. S/P partial thyroidectomy  7. Stage 3a chronic kidney disease (HCC)- baseline eGFR in the 50s. Most recent was 55. Will continue to monitor   8. Dyslipidemia- on statin  -     Lipid Panel with Direct LDL reflex; Future; Expected date: 2024  9. Post-menopausal  -     DXA bone density spine hip and pelvis; Future; Expected date: 2024      Falls Plan of Care: balance, strength, and gait training instructions were provided.       History of Present Illness     Presents to establish care. Preveoius pcp is retiring.   Discussed hx. Reviewed last labatorory stueis   Follows with orthopedic ans surgical oncology  Lives spouse and children  Former smoker -    Denies personal hx of MI or CVA.         Review of Systems   Constitutional:  Negative for fever.   Respiratory:  Negative for shortness of breath.    Cardiovascular:  Negative for chest pain and leg  "swelling.   Musculoskeletal:  Positive for arthralgias and gait problem.   Neurological:  Positive for headaches (since head trauma early this year.). Negative for dizziness and light-headedness.       Objective     /62 (BP Location: Left arm, Patient Position: Sitting, Cuff Size: Standard)   Pulse 73   Temp 97.5 °F (36.4 °C) (Tympanic)   Resp 18   Ht 5' 4.5\" (1.638 m)   Wt 97.7 kg (215 lb 6.4 oz)   SpO2 95%   BMI 36.40 kg/m²     Physical Exam  HENT:      Head: Normocephalic and atraumatic.      Right Ear: External ear normal.      Left Ear: External ear normal.      Mouth/Throat:      Mouth: Mucous membranes are moist.      Pharynx: Oropharynx is clear.   Cardiovascular:      Rate and Rhythm: Normal rate and regular rhythm.      Heart sounds: No murmur heard.  Pulmonary:      Effort: Pulmonary effort is normal.      Breath sounds: Normal breath sounds.   Abdominal:      General: Bowel sounds are normal.      Palpations: Abdomen is soft.   Musculoskeletal:      Right lower leg: No edema.      Left lower leg: No edema.   Neurological:      Mental Status: She is alert and oriented to person, place, and time.   Psychiatric:         Mood and Affect: Mood normal.         Thought Content: Thought content normal.       Administrative Statements           "

## 2024-07-09 NOTE — PATIENT INSTRUCTIONS
Preventing Falls: Exercises to Improve Balance, Flexibility, Strength, and Staying Power      Certain types of exercises may help make you less likely to fall. Try the ones below. Or do other exercises that your healthcare provider suggests. Depending on your health, you may need to start slowly. Don’t let that stop you. Even small amounts of exercise can help you. Be sure to talk to your healthcare provider before starting any exercise program.    Improve Balance  Many types of exercise can help improve balance. Louis chi and yoga are good examples. Here’s another one to try. You can do it anytime and almost anywhere.  · Stand next to a counter or solid support.  · Push yourself up onto your tiptoes.  · Hold for 5 seconds. If you start to lose your balance, hold on to the counter.  · Rest and repeat 5 times. Work up to holding for 20 to 30 seconds, if you can.    Increase Flexibility  Being more flexible makes it easier for you to move around safely. Try exercises like the seated hamstring stretch.  · Sit in a chair and put one foot on a stool.  · Straighten your leg and reach with both hands down either side of your leg. Reach as far down your leg as you can.  · Hold for about 20 seconds.  · Go back to the starting position. Then repeat 5 times. Switch legs.    Build Strength  “Resistance” exercises help build strength. You can do them without equipment. Or you can use weights, elastic bands, or special machines. One such exercise is called the biceps curl. You can hold a 1 pound weight or even a can of soup. Do this exercise at least 3 times a week. Strive for every day.    · Sit up straight in a chair.  · Keep your elbow close to your body and your wrist straight.  · Bend your arm, moving your hand up to your shoulder. Then slowly lower your arm.  · Repeat 5 times. Switch to the other arm    Build Your Staying Power  “Aerobic” exercises make your heart and lungs stronger so you can keep moving longer. Walking and  swimming are two of the best types of exercises you can do. Using a stationary bike is great, too. Find an aerobic exercise that you enjoy. Start slowly and build up. Even 5 minutes is helpful. Aim for a goal of 30 minutes, at least 3 times a week. You don’t have to do 30 minutes in one session. Break it up and walk a little throughout the day.    More Helpful Tips  · Start easy. Slowly work up to doing more.  · Talk with your healthcare provider about the best exercises for you.  · Call senior centers or health clubs about exercise programs.  · If needed, have a family member watch you walk every so often to check your stability.  · Exercise with a friend. Choose an activity you both enjoy.  · Try exercises that you can do anytime, anywhere.  Here are two examples. Have someone with you when you first try these:  · Practice walking by placing one foot right in front of the other.  · Stand up and sit down 10 times. Repeat this throughout the day.

## 2024-07-18 ENCOUNTER — HOSPITAL ENCOUNTER (OUTPATIENT)
Age: 84
Discharge: HOME | End: 2024-07-18
Payer: MEDICARE

## 2024-07-18 DIAGNOSIS — Z78.0 POST-MENOPAUSAL: ICD-10-CM

## 2024-07-18 PROCEDURE — 77080 DXA BONE DENSITY AXIAL: CPT

## 2024-09-19 DIAGNOSIS — I47.10 PAROXYSMAL SVT (SUPRAVENTRICULAR TACHYCARDIA) (HCC): ICD-10-CM

## 2024-09-19 RX ORDER — METOPROLOL SUCCINATE 25 MG/1
25 TABLET, EXTENDED RELEASE ORAL DAILY
Qty: 90 TABLET | Refills: 0 | Status: SHIPPED | OUTPATIENT
Start: 2024-09-19

## 2024-09-20 DIAGNOSIS — I10 HYPERTENSION, UNSPECIFIED TYPE: ICD-10-CM

## 2024-09-20 RX ORDER — RAMIPRIL 5 MG/1
5 CAPSULE ORAL DAILY
Qty: 90 CAPSULE | Refills: 0 | Status: SHIPPED | OUTPATIENT
Start: 2024-09-20

## 2024-09-20 NOTE — TELEPHONE ENCOUNTER
Reason for call:   [x] Refill   [] Prior Auth  [] Other:     Office:   [x] PCP/Provider -   [] Specialty/Provider -     Medication: Ramipril 5 mg, take 1 capsule by mouth every day       Pharmacy: [facility]    Does the patient have enough for 3 days?   [x] Yes   [] No - Send as HP to POD

## 2024-10-07 DIAGNOSIS — I10 ESSENTIAL HYPERTENSION: ICD-10-CM

## 2024-10-07 NOTE — TELEPHONE ENCOUNTER
Reason for call:   [x] Refill   [] Prior Auth  [] Other:     Office:   [x] PCP/Provider -   [] Specialty/Provider -     Medication: hydroCHLOROthiazide 25 mg tablet     Dose/Frequency: TAKE 1 TABLET BY MOUTH EVERY DAY     Quantity: 90 tablet     Pharmacy: [facility]    Does the patient have enough for 3 days?   [x] Yes   [] No - Send as HP to POD

## 2024-10-08 RX ORDER — HYDROCHLOROTHIAZIDE 25 MG/1
25 TABLET ORAL DAILY
Qty: 90 TABLET | Refills: 1 | Status: SHIPPED | OUTPATIENT
Start: 2024-10-08

## 2024-10-18 ENCOUNTER — OFFICE VISIT (OUTPATIENT)
Dept: CARDIOLOGY CLINIC | Facility: CLINIC | Age: 84
End: 2024-10-18
Payer: MEDICARE

## 2024-10-18 VITALS
OXYGEN SATURATION: 98 % | RESPIRATION RATE: 16 BRPM | SYSTOLIC BLOOD PRESSURE: 130 MMHG | DIASTOLIC BLOOD PRESSURE: 70 MMHG | WEIGHT: 217 LBS | HEIGHT: 64 IN | HEART RATE: 61 BPM | BODY MASS INDEX: 37.05 KG/M2

## 2024-10-18 DIAGNOSIS — E78.5 DYSLIPIDEMIA: ICD-10-CM

## 2024-10-18 DIAGNOSIS — I10 ESSENTIAL HYPERTENSION: ICD-10-CM

## 2024-10-18 DIAGNOSIS — I47.10 SVT (SUPRAVENTRICULAR TACHYCARDIA) (HCC): Primary | ICD-10-CM

## 2024-10-18 DIAGNOSIS — I47.10 PAROXYSMAL SVT (SUPRAVENTRICULAR TACHYCARDIA) (HCC): ICD-10-CM

## 2024-10-18 DIAGNOSIS — I44.7 LBBB (LEFT BUNDLE BRANCH BLOCK): ICD-10-CM

## 2024-10-18 DIAGNOSIS — E11.3293 TYPE 2 DIABETES MELLITUS WITH BOTH EYES AFFECTED BY MILD NONPROLIFERATIVE RETINOPATHY WITHOUT MACULAR EDEMA, WITHOUT LONG-TERM CURRENT USE OF INSULIN (HCC): ICD-10-CM

## 2024-10-18 DIAGNOSIS — N18.31 STAGE 3A CHRONIC KIDNEY DISEASE (HCC): ICD-10-CM

## 2024-10-18 PROCEDURE — 99214 OFFICE O/P EST MOD 30 MIN: CPT | Performed by: INTERNAL MEDICINE

## 2024-10-18 RX ORDER — METOPROLOL SUCCINATE 25 MG/1
25 TABLET, EXTENDED RELEASE ORAL DAILY
Qty: 90 TABLET | Refills: 0 | Status: SHIPPED | OUTPATIENT
Start: 2024-10-18

## 2024-10-18 NOTE — PROGRESS NOTES
Cardiology   Abena Savage 84 y.o. female MRN: [id number]        Impression:  1. Paroxysmal SVT - s/p ablation .  No palpitations.   2. Hypertension - controlled.  3. Dyslipidemia - on statin.  4. Chronic LBBB  5. She is post op knee surgery.     Recommendations:  1. Continue current medications.  2. BP and LDL well conmtrollled, due for repeat.   3. Follow up in 1 year.         HPI: Abena Savage is a 84 y.o. year old female with paroxysmal SVT s/p ablation , dyslipidemia, LBBB, and hypertension, who returns for follow up.  No chest pain, shortness of breath, or palpitations.   She feels well. She gets around with a walker for balance.         Review of Systems   Constitutional: Negative.    HENT: Negative.     Eyes: Negative.    Respiratory:  Negative for chest tightness and shortness of breath.    Cardiovascular:  Negative for chest pain, palpitations and leg swelling.   Gastrointestinal: Negative.    Endocrine: Negative.    Genitourinary: Negative.    Musculoskeletal:  Positive for joint swelling.   Skin: Negative.    Allergic/Immunologic: Negative.    Neurological: Negative.    Hematological: Negative.    Psychiatric/Behavioral: Negative.     All other systems reviewed and are negative.        Past Medical History:   Diagnosis Date    Chronic kidney disease     Stage III    Diabetes mellitus (HCC)     Disease of thyroid gland     Hyperlipidemia     Hypertension     Irregular heart beat     LBBB (left bundle branch block)     Palpitations     Preop cardiovascular exam 2023    SVT (supraventricular tachycardia) (HCC) 2019    Resolved Post Ablation     Past Surgical History:   Procedure Laterality Date    APPENDECTOMY      CARDIAC ELECTROPHYSIOLOGY MAPPING AND ABLATION  2019    CARPAL TUNNEL RELEASE Right      SECTION      HERNIA REPAIR      JOINT REPLACEMENT Right     Knee    JOINT REPLACEMENT Right     Hip    KNEE ARTHROSCOPY Right     therapeutic     KNEE SURGERY Left     Cartilage  repair    THYROID LOBECTOMY Left 2021    Procedure: HEMITHYROIDECTOMY;  Surgeon: Andrea Lucio MD;  Location: [facility];  Service: Surgical Oncology    TONSILLECTOMY      US GUIDED THYROID BIOPSY  2021     Social History     Substance and Sexual Activity   Alcohol Use Not Currently    Alcohol/week: 0.0 standard drinks of alcohol     Social History     Substance and Sexual Activity   Drug Use Never     Social History     Tobacco Use   Smoking Status Former    Current packs/day: 0.00    Types: Cigarettes    Quit date:     Years since quittin.8   Smokeless Tobacco Never     Family History   Problem Relation Age of Onset    Arthritis Mother     Other Mother         CABG    Cancer Mother     Heart disease Mother     Osteoporosis Mother     Lung cancer Father     Pancreatic cancer Father        Allergies:  Allergies   Allergen Reactions    Proparacaine Itching       Medications:     Current Outpatient Medications:     Aspirin 81 MG EC tablet, Take 1 tablet by mouth daily, Disp: , Rfl:     hydroCHLOROthiazide 25 mg tablet, Take 1 tablet (25 mg total) by mouth daily, Disp: 90 tablet, Rfl: 1    levothyroxine 50 mcg tablet, TAKE 1 TABLET BY MOUTH EVERY DAY, Disp: 90 tablet, Rfl: 1    magnesium Oxide (MAG-OX) 400 mg TABS, Take 1 tablet by mouth daily, Disp: , Rfl:     metFORMIN (GLUCOPHAGE-XR) 500 mg 24 hr tablet, Take 1 tablet (500 mg total) by mouth daily with dinner, Disp: 90 tablet, Rfl: 1    metoprolol succinate (TOPROL-XL) 25 mg 24 hr tablet, TAKE 1 TABLET (25 MG TOTAL) BY MOUTH DAILY., Disp: 90 tablet, Rfl: 0    Multiple Vitamins-Minerals (CENTRUM SILVER 50+WOMEN PO), Take 1 tablet by mouth daily , Disp: , Rfl:     Potassium 99 MG TABS, Take 1 tablet by mouth daily, Disp: , Rfl:     ramipril (ALTACE) 5 mg capsule, Take 1 capsule (5 mg total) by mouth daily, Disp: 90 capsule, Rfl: 0    simvastatin (ZOCOR) 10 mg tablet, TAKE 1 TABLET BY MOUTH EVERY DAY, Disp: 90 tablet, Rfl: 1      Wt Readings from Last  3 Encounters:   10/18/24 98.4 kg (217 lb)   07/09/24 97.7 kg (215 lb 6.4 oz)   07/08/24 98.3 kg (216 lb 12.8 oz)     Temp Readings from Last 3 Encounters:   07/09/24 97.5 °F (36.4 °C) (Tympanic)   05/28/24 97.5 °F (36.4 °C) (Temporal)   03/04/24 97.7 °F (36.5 °C) (Temporal)     BP Readings from Last 3 Encounters:   10/18/24 130/70   07/09/24 144/62   07/08/24 130/72     Pulse Readings from Last 3 Encounters:   10/18/24 61   07/09/24 73   07/08/24 78         Physical Exam  HENT:      Head: Atraumatic.      Mouth/Throat:      Mouth: Mucous membranes are moist.   Eyes:      Extraocular Movements: Extraocular movements intact.   Cardiovascular:      Rate and Rhythm: Normal rate and regular rhythm.      Heart sounds: Normal heart sounds.   Pulmonary:      Effort: Pulmonary effort is normal.      Breath sounds: Normal breath sounds.   Abdominal:      General: Abdomen is flat.   Musculoskeletal:         General: Normal range of motion.      Cervical back: Normal range of motion.   Skin:     General: Skin is warm.   Neurological:      General: No focal deficit present.      Mental Status: She is alert and oriented to person, place, and time.   Psychiatric:         Mood and Affect: Mood normal.         Behavior: Behavior normal.           Laboratory Studies:  CMP:  Lab Results   Component Value Date     01/10/2018    K 3.9 02/06/2024    CL 97 (L) 02/06/2024    CO2 29 02/06/2024    ANIONGAP 8 10/14/2015    BUN 16 02/06/2024    CREATININE 1.02 02/06/2024    GLUCOSE 172 (H) 10/14/2015    AST 28 02/06/2024    ALT 30 02/06/2024    BILITOT 0.54 10/14/2015    EGFR 55 (L) 02/06/2024       Lipid Profile:   Lab Results   Component Value Date    CHOL 153 07/10/2017     Lab Results   Component Value Date    HDL 55 08/14/2023     Lab Results   Component Value Date    LDLCALC 66 08/14/2023     Lab Results   Component Value Date    TRIG 168 (H) 08/14/2023       Cardiac testing:   EKG reviewed personally:   Results for orders placed  during the hospital encounter of 10/24/19    Echo complete with contrast if indicated    [facility]  [address] [address] [facility]  [place] [id number] [phone]    Transthoracic Echocardiogram  Limited 2D, M-mode, Doppler, and Color Doppler    Study date:  24-Oct-2019    Patient: JEFFERY CLINE  MR number: [id number]  Account number: [id number]  : 1940  Age: 79 years  Gender: Female  Status: Outpatient  Location: [facility]  Height: 64 in  Weight: 237 lb  BP: 122/ 78 mmHg    Indications: Murmur.    Diagnoses: R01.1 - Cardiac murmur, unspecified    Sonographer:  FRANCA Akins  Primary Physician:  Wesley Norris MD  Referring Physician:  Wilfredo Valera MD  Group:  [facility]  Interpreting Physician:  Lon Hale DO    IMPRESSIONS:  Normal left ventricular size and systolic function, ejection fraction 55%.  Abnormal septal motion consistent with left bundle branch block.  Grade 1 diastolic dysfunction.  Normal right ventricular size and systolic function.  Normal aortic root.  Moderate left atrial dilation.  Normal right atrium.  Mildly calcified and sclerotic aortic valve with mild insufficiency and no stenosis.  No other significant valve abnormalities.  Normal IVC size and inspiratory collapse. RA pressure estimated at 3 mmHg.  Upper normal pulmonary pressure. PASP estimated at 36 mmHg.    Compared to prior echocardiogram, the aortic valve is now mildly calcified and sclerotic with mild aortic insufficiency.    SUMMARY    LEFT VENTRICLE:  Systolic function was normal by visual assessment. Ejection fraction was estimated to be 55 %.  There were no regional wall motion abnormalities.  Doppler parameters were consistent with abnormal left ventricular relaxation (grade 1 diastolic dysfunction).    VENTRICULAR SEPTUM:  There was mild paradoxical motion. These changes are consistent with LBBB.    RIGHT VENTRICLE:  Systolic pressure was at the  upper limits of normal. Estimated peak pressure was 36 mmHg.    LEFT ATRIUM:  The atrium was moderately dilated.    AORTIC VALVE:  Leaflets exhibited moderate calcification, lower normal cuspal separation, and sclerosis.  There was mild regurgitation.  Regurgitation grade was 1+ on a scale of 0 to 4+.    COMPARISONS:  Compared to prior echocardiogram, the aortic valve is now mildly calcified and sclerotic with mild aortic insufficiency.    HISTORY: PRIOR HISTORY: SVT. LBBB. Risk factors: diabetes, medication-treated hypercholesterolemia, and morbid obesity. PRIOR PROCEDURES: Arrhythmia ablation.    PROCEDURE: The study was performed in the [facility]. This was a routine study. The transthoracic approach was used. The study included limited 2D imaging, M-mode, complete spectral Doppler, and color Doppler. The  heart rate was 78 bpm, at the start of the study. Images were obtained from the parasternal, apical, subcostal, and suprasternal notch acoustic windows. Intravenous contrast ( 1.2 Definity in NSS., 6 ml) was administered to opacify the  left ventricle. This was a technically difficult study.    LEFT VENTRICLE: Size was normal. Systolic function was normal by visual assessment. Ejection fraction was estimated to be 55 %. There were no regional wall motion abnormalities. Wall thickness was normal. DOPPLER: Doppler parameters were  consistent with abnormal left ventricular relaxation (grade 1 diastolic dysfunction).    VENTRICULAR SEPTUM: There was mild paradoxical motion. These changes are consistent with LBBB.    RIGHT VENTRICLE: The size was normal. Systolic function was normal. Wall thickness was normal. DOPPLER: Systolic pressure was at the upper limits of normal. Estimated peak pressure was 36 mmHg.    LEFT ATRIUM: The atrium was moderately dilated.    RIGHT ATRIUM: Size was normal.    MITRAL VALVE: Valve structure was normal. There was normal leaflet separation. DOPPLER: The transmitral  velocity was within the normal range. There was no evidence for stenosis. There was no regurgitation.    AORTIC VALVE: Leaflets exhibited moderate calcification, lower normal cuspal separation, and sclerosis. DOPPLER: Transaortic velocity was minimally increased. There was no evidence for stenosis. There was mild regurgitation. Regurgitation  grade was 1+ on a scale of 0 to 4+.    TRICUSPID VALVE: The valve structure was normal. There was normal leaflet separation. DOPPLER: The transtricuspid velocity was within the normal range. There was no evidence for stenosis. There was no regurgitation.    PULMONIC VALVE: Leaflets exhibited normal thickness, no calcification, and normal cuspal separation. DOPPLER: The transpulmonic velocity was within the normal range. There was no regurgitation.    PERICARDIUM: There was no pericardial effusion. The pericardium was normal in appearance.    AORTA: The root exhibited normal size.    SYSTEMIC VEINS: IVC: The inferior vena cava was normal in size and course. Respirophasic changes were normal. RA pressure estimated at 3 mmHg.    SYSTEM MEASUREMENT TABLES    2D  %FS: 24.97 %  Ao Diam: 3.02 cm  EDV(Teich): 72.41 ml  EF(Teich): 49.89 %  ESV(Teich): 36.28 ml  IVSd: 1.01 cm  LA Diam: 3.31 cm  LVIDd: 4.06 cm  LVIDs: 3.04 cm  LVOT Diam: 1.83 cm  LVPWd: 0.99 cm  RWT: 0.49  SV(Teich): 36.13 ml    2D mode  LAAs: 20.5 cm2  RAAs: 11.4 cm2    CW  AR Dec [place]: 2.55 m/s2  AR Dec Time: 1422.06 ms  AR PHT: 412.4 ms  AR Vmax: 3.63 m/s  AR maxP.69 mmHg  AV Env.Ti: 342.53 ms  AV MaxP.45 mmHg  AV VTI: 44.79 cm  AV Vmax: 1.83 m/s  AV Vmean: 1.31 m/s  AV meanP.52 mmHg  TR MaxP.55 mmHg  TR Vmax: 2.85 m/s    MM  TAPSE: 2.45 cm    PW  DEMETRIA (VTI): 1.8 cm2  DEMETRIA Vmax: 1.89 cm2  E': 0.06 m/s  E/E': 12.59  LVOT Env.Ti: 338.73 ms  LVOT VTI: 30.58 cm  LVOT Vmax: 1.31 m/s  LVOT Vmean: 0.9 m/s  LVOT maxP.89 mmHg  LVOT meanPG: 3.84 mmHg  LVSV Dopp: 80.72 ml  MV A Jude: 1.12 m/s  MV Dec [place]:  4.68 m/s2  MV DecT: 170.34 ms  MV E Jude: 0.8 m/s  MV E/A Ratio: 0.71  MV PHT: 49.4 ms  MVA By PHT: 4.45 cm2    [employer] Accredited Echocardiography Laboratory    Prepared and electronically signed by    Lon Hale DO  Signed 24-Oct-2019 14:28:31    No results found for this or any previous visit.    No results found for this or any previous visit.    No results found for this or any previous visit.

## 2024-10-21 DIAGNOSIS — E78.2 MIXED HYPERLIPIDEMIA: ICD-10-CM

## 2024-10-21 NOTE — TELEPHONE ENCOUNTER
Reason for call:   [x] Refill   [] Prior Auth  [] Other:     Office:   [x] PCP/Provider - Muna Yoder DO / Landy SILVEIRA  [] Specialty/Provider -     Medication: simvastatin (ZOCOR) 10 mg tablet     Dose/Frequency: TAKE 1 TABLET BY MOUTH EVERY DAY     Quantity: 90    Pharmacy: [facility]/pharmacy #[id number] - [facility] CARLOS GEE - [facility] JANAI ALVARADO     Does the patient have enough for 3 days?   [x] Yes   [] No - Send as HP to POD

## 2024-10-22 RX ORDER — SIMVASTATIN 10 MG
10 TABLET ORAL DAILY
Qty: 90 TABLET | Refills: 1 | Status: SHIPPED | OUTPATIENT
Start: 2024-10-22

## 2024-10-31 ENCOUNTER — APPOINTMENT (OUTPATIENT)
Age: 84
End: 2024-10-31
Payer: MEDICARE

## 2024-10-31 DIAGNOSIS — E78.5 DYSLIPIDEMIA: ICD-10-CM

## 2024-10-31 DIAGNOSIS — E03.9 ACQUIRED HYPOTHYROIDISM: ICD-10-CM

## 2024-10-31 DIAGNOSIS — I10 ESSENTIAL HYPERTENSION: ICD-10-CM

## 2024-10-31 LAB
ANION GAP SERPL CALCULATED.3IONS-SCNC: 11 MMOL/L (ref 4–13)
BUN SERPL-MCNC: 18 MG/DL (ref 5–25)
CALCIUM SERPL-MCNC: 9.8 MG/DL (ref 8.4–10.2)
CHLORIDE SERPL-SCNC: 103 MMOL/L (ref 96–108)
CHOLEST SERPL-MCNC: 156 MG/DL
CO2 SERPL-SCNC: 28 MMOL/L (ref 21–32)
CREAT SERPL-MCNC: 1.03 MG/DL (ref 0.6–1.3)
GFR SERPL CREATININE-BSD FRML MDRD: 50 ML/MIN/1.73SQ M
GLUCOSE P FAST SERPL-MCNC: 133 MG/DL (ref 65–99)
HDLC SERPL-MCNC: 58 MG/DL
LDLC SERPL CALC-MCNC: 74 MG/DL (ref 0–100)
POTASSIUM SERPL-SCNC: 4.1 MMOL/L (ref 3.5–5.3)
SODIUM SERPL-SCNC: 142 MMOL/L (ref 135–147)
TRIGL SERPL-MCNC: 120 MG/DL
TSH SERPL DL<=0.05 MIU/L-ACNC: 1.76 UIU/ML (ref 0.45–4.5)

## 2024-10-31 PROCEDURE — 36415 COLL VENOUS BLD VENIPUNCTURE: CPT

## 2024-10-31 PROCEDURE — 84443 ASSAY THYROID STIM HORMONE: CPT

## 2024-10-31 PROCEDURE — 80061 LIPID PANEL: CPT

## 2024-10-31 PROCEDURE — 80048 BASIC METABOLIC PNL TOTAL CA: CPT

## 2024-11-01 ENCOUNTER — TELEPHONE (OUTPATIENT)
Age: 84
End: 2024-11-01

## 2024-11-01 NOTE — TELEPHONE ENCOUNTER
----- Message from María Chauhan PA-C sent at 11/1/2024  8:00 AM EDT -----  Cholesterol levels in the normal range.  Continue simvastatin.  Blood sugar elevated, but last A1c was in the goal range.  Continue metformin.

## 2024-11-29 DIAGNOSIS — I47.10 PAROXYSMAL SVT (SUPRAVENTRICULAR TACHYCARDIA) (HCC): ICD-10-CM

## 2024-11-29 NOTE — TELEPHONE ENCOUNTER
Patient called requesting refill for metoprolol. Patient made aware medication was refilled on 10.18.2024 for 90 with 0 refills to [facility] pharmacy. Patient instructed to contact the pharmacy to obtain refills of medication. Patient verbalized understanding.

## 2024-12-16 DIAGNOSIS — I10 HYPERTENSION, UNSPECIFIED TYPE: ICD-10-CM

## 2024-12-16 NOTE — TELEPHONE ENCOUNTER
Patient called to request a refill for their ramipril (ALTACE) 5 mg capsule  advised a refill was requested on 12/16/2024 and is pending approval. Patient verbalized understanding and is in agreement.

## 2024-12-17 RX ORDER — RAMIPRIL 5 MG/1
5 CAPSULE ORAL DAILY
Qty: 90 CAPSULE | Refills: 1 | Status: SHIPPED | OUTPATIENT
Start: 2024-12-17

## 2024-12-18 DIAGNOSIS — E11.3293 TYPE 2 DIABETES MELLITUS WITH BOTH EYES AFFECTED BY MILD NONPROLIFERATIVE RETINOPATHY WITHOUT MACULAR EDEMA, WITHOUT LONG-TERM CURRENT USE OF INSULIN (HCC): ICD-10-CM

## 2024-12-18 RX ORDER — METFORMIN HYDROCHLORIDE 500 MG/1
500 TABLET, EXTENDED RELEASE ORAL
Qty: 90 TABLET | Refills: 1 | Status: SHIPPED | OUTPATIENT
Start: 2024-12-18

## 2025-01-10 ENCOUNTER — RA CDI HCC (OUTPATIENT)
Dept: OTHER | Facility: HOSPITAL | Age: 85
End: 2025-01-10

## 2025-01-16 ENCOUNTER — OFFICE VISIT (OUTPATIENT)
Age: 85
End: 2025-01-16
Payer: COMMERCIAL

## 2025-01-16 VITALS
BODY MASS INDEX: 36.74 KG/M2 | DIASTOLIC BLOOD PRESSURE: 70 MMHG | WEIGHT: 215.2 LBS | OXYGEN SATURATION: 97 % | SYSTOLIC BLOOD PRESSURE: 124 MMHG | HEIGHT: 64 IN | HEART RATE: 67 BPM | TEMPERATURE: 96.7 F

## 2025-01-16 DIAGNOSIS — M85.89 OSTEOPENIA OF MULTIPLE SITES: ICD-10-CM

## 2025-01-16 DIAGNOSIS — I47.10 SVT (SUPRAVENTRICULAR TACHYCARDIA) (HCC): ICD-10-CM

## 2025-01-16 DIAGNOSIS — N18.31 STAGE 3A CHRONIC KIDNEY DISEASE (HCC): ICD-10-CM

## 2025-01-16 DIAGNOSIS — E66.01 MORBID OBESITY WITH BMI OF 40.0-44.9, ADULT (HCC): ICD-10-CM

## 2025-01-16 DIAGNOSIS — Z00.00 ENCOUNTER FOR SUBSEQUENT ANNUAL WELLNESS VISIT (AWV) IN MEDICARE PATIENT: Primary | ICD-10-CM

## 2025-01-16 DIAGNOSIS — E11.3293 TYPE 2 DIABETES MELLITUS WITH BOTH EYES AFFECTED BY MILD NONPROLIFERATIVE RETINOPATHY WITHOUT MACULAR EDEMA, WITHOUT LONG-TERM CURRENT USE OF INSULIN (HCC): ICD-10-CM

## 2025-01-16 DIAGNOSIS — E03.9 ACQUIRED HYPOTHYROIDISM: ICD-10-CM

## 2025-01-16 PROBLEM — E89.0 S/P PARTIAL THYROIDECTOMY: Status: RESOLVED | Noted: 2021-06-17 | Resolved: 2025-01-16

## 2025-01-16 LAB — SL AMB POCT HEMOGLOBIN AIC: 7 (ref ?–6.5)

## 2025-01-16 PROCEDURE — G0439 PPPS, SUBSEQ VISIT: HCPCS | Performed by: FAMILY MEDICINE

## 2025-01-16 PROCEDURE — 99214 OFFICE O/P EST MOD 30 MIN: CPT | Performed by: FAMILY MEDICINE

## 2025-01-16 PROCEDURE — 83036 HEMOGLOBIN GLYCOSYLATED A1C: CPT | Performed by: FAMILY MEDICINE

## 2025-01-16 RX ORDER — B-COMPLEX WITH VITAMIN C
2 TABLET ORAL
Qty: 60 TABLET | Refills: 6 | Status: SHIPPED | OUTPATIENT
Start: 2025-01-16

## 2025-01-16 NOTE — PATIENT INSTRUCTIONS
Medicare Preventive Visit Patient Instructions  Thank you for completing your Welcome to Medicare Visit or Medicare Annual Wellness Visit today. Your next wellness visit will be due in one year (1/17/2026).  The screening/preventive services that you may require over the next 5-10 years are detailed below. Some tests may not apply to you based off risk factors and/or age. Screening tests ordered at today's visit but not completed yet may show as past due. Also, please note that scanned in results may not display below.  Preventive Screenings:  Service Recommendations Previous Testing/Comments   Colorectal Cancer Screening  * Colonoscopy    * Fecal Occult Blood Test (FOBT)/Fecal Immunochemical Test (FIT)  * Fecal DNA/Cologuard Test  * Flexible Sigmoidoscopy Age: 45-75 years old   Colonoscopy: every 10 years (may be performed more frequently if at higher risk)  OR  FOBT/FIT: every 1 year  OR  Cologuard: every 3 years  OR  Sigmoidoscopy: every 5 years  Screening may be recommended earlier than age 45 if at higher risk for colorectal cancer. Also, an individualized decision between you and your healthcare provider will decide whether screening between the ages of 76-85 would be appropriate. Colonoscopy: Not on file  FOBT/FIT: Not on file  Cologuard: Not on file  Sigmoidoscopy: Not on file          Breast Cancer Screening Age: 40+ years old  Frequency: every 1-2 years  Not required if history of left and right mastectomy Mammogram: Not on file        Cervical Cancer Screening Between the ages of 21-29, pap smear recommended once every 3 years.   Between the ages of 30-65, can perform pap smear with HPV co-testing every 5 years.   Recommendations may differ for women with a history of total hysterectomy, cervical cancer, or abnormal pap smears in past. Pap Smear: Not on file    Screening Not Indicated   Hepatitis C Screening Once for adults born between 1945 and 1965  More frequently in patients at high risk for Hepatitis  C Hep C Antibody: Not on file        Diabetes Screening 1-2 times per year if you're at risk for diabetes or have pre-diabetes Fasting glucose: 133 mg/dL (10/31/2024)  A1C: 6.7 (5/28/2024)  Screening Not Indicated  History Diabetes   Cholesterol Screening Once every 5 years if you don't have a lipid disorder. May order more often based on risk factors. Lipid panel: 10/31/2024    Screening Not Indicated  History Lipid Disorder     Other Preventive Screenings Covered by Medicare:  Abdominal Aortic Aneurysm (AAA) Screening: covered once if your at risk. You're considered to be at risk if you have a family history of AAA.  Lung Cancer Screening: covers low dose CT scan once per year if you meet all of the following conditions: (1) Age 55-77; (2) No signs or symptoms of lung cancer; (3) Current smoker or have quit smoking within the last 15 years; (4) You have a tobacco smoking history of at least 20 pack years (packs per day multiplied by number of years you smoked); (5) You get a written order from a healthcare provider.  Glaucoma Screening: covered annually if you're considered high risk: (1) You have diabetes OR (2) Family history of glaucoma OR (3)  aged 50 and older OR (4)  American aged 65 and older  Osteoporosis Screening: covered every 2 years if you meet one of the following conditions: (1) You're estrogen deficient and at risk for osteoporosis based off medical history and other findings; (2) Have a vertebral abnormality; (3) On glucocorticoid therapy for more than 3 months; (4) Have primary hyperparathyroidism; (5) On osteoporosis medications and need to assess response to drug therapy.   Last bone density test (DXA Scan): 07/18/2024.  HIV Screening: covered annually if you're between the age of 15-65. Also covered annually if you are younger than 15 and older than 65 with risk factors for HIV infection. For pregnant patients, it is covered up to 3 times per  pregnancy.    Immunizations:  Immunization Recommendations   Influenza Vaccine Annual influenza vaccination during flu season is recommended for all persons aged >= 6 months who do not have contraindications   Pneumococcal Vaccine   * Pneumococcal conjugate vaccine = PCV13 (Prevnar 13), PCV15 (Vaxneuvance), PCV20 (Prevnar 20)  * Pneumococcal polysaccharide vaccine = PPSV23 (Pneumovax) Adults 19-63 yo with certain risk factors or if 65+ yo  If never received any pneumonia vaccine: recommend Prevnar 20 (PCV20)  Give PCV20 if previously received 1 dose of PCV13 or PPSV23   Hepatitis B Vaccine 3 dose series if at intermediate or high risk (ex: diabetes, end stage renal disease, liver disease)   Respiratory syncytial virus (RSV) Vaccine - COVERED BY MEDICARE PART D  * RSVPreF3 (Arexvy) CDC recommends that adults 60 years of age and older may receive a single dose of RSV vaccine using shared clinical decision-making (SCDM)   Tetanus (Td) Vaccine - COST NOT COVERED BY MEDICARE PART B Following completion of primary series, a booster dose should be given every 10 years to maintain immunity against tetanus. Td may also be given as tetanus wound prophylaxis.   Tdap Vaccine - COST NOT COVERED BY MEDICARE PART B Recommended at least once for all adults. For pregnant patients, recommended with each pregnancy.   Shingles Vaccine (Shingrix) - COST NOT COVERED BY MEDICARE PART B  2 shot series recommended in those 19 years and older who have or will have weakened immune systems or those 50 years and older     Health Maintenance Due:  There are no preventive care reminders to display for this patient.  Immunizations Due:      Topic Date Due   • COVID-19 Vaccine (4 - 2024-25 season) 09/01/2024     Advance Directives   What are advance directives?  Advance directives are legal documents that state your wishes and plans for medical care. These plans are made ahead of time in case you lose your ability to make decisions for yourself.  Advance directives can apply to any medical decision, such as the treatments you want, and if you want to donate organs.   What are the types of advance directives?  There are many types of advance directives, and each state has rules about how to use them. You may choose a combination of any of the following:  Living will:  This is a written record of the treatment you want. You can also choose which treatments you do not want, which to limit, and which to stop at a certain time. This includes surgery, medicine, IV fluid, and tube feedings.   Durable power of  for healthcare (DPAHC):  This is a written record that states who you want to make healthcare choices for you when you are unable to make them for yourself. This person, called a proxy, is usually a family member or a friend. You may choose more than 1 proxy.  Do not resuscitate (DNR) order:  A DNR order is used in case your heart stops beating or you stop breathing. It is a request not to have certain forms of treatment, such as CPR. A DNR order may be included in other types of advance directives.  Medical directive:  This covers the care that you want if you are in a coma, near death, or unable to make decisions for yourself. You can list the treatments you want for each condition. Treatment may include pain medicine, surgery, blood transfusions, dialysis, IV or tube feedings, and a ventilator (breathing machine).  Values history:  This document has questions about your views, beliefs, and how you feel and think about life. This information can help others choose the care that you would choose.  Why are advance directives important?  An advance directive helps you control your care. Although spoken wishes may be used, it is better to have your wishes written down. Spoken wishes can be misunderstood, or not followed. Treatments may be given even if you do not want them. An advance directive may make it easier for your family to make difficult  choices about your care.   Weight Management   Why it is important to manage your weight:  Being overweight increases your risk of health conditions such as heart disease, high blood pressure, type 2 diabetes, and certain types of cancer. It can also increase your risk for osteoarthritis, sleep apnea, and other respiratory problems. Aim for a slow, steady weight loss. Even a small amount of weight loss can lower your risk of health problems.  How to lose weight safely:  A safe and healthy way to lose weight is to eat fewer calories and get regular exercise. You can lose up about 1 pound a week by decreasing the number of calories you eat by 500 calories each day.   Healthy meal plan for weight management:  A healthy meal plan includes a variety of foods, contains fewer calories, and helps you stay healthy. A healthy meal plan includes the following:  Eat whole-grain foods more often.  A healthy meal plan should contain fiber. Fiber is the part of grains, fruits, and vegetables that is not broken down by your body. Whole-grain foods are healthy and provide extra fiber in your diet. Some examples of whole-grain foods are whole-wheat breads and pastas, oatmeal, brown rice, and bulgur.  Eat a variety of vegetables every day.  Include dark, leafy greens such as spinach, kale, [place]landa greens, and mustard greens. Eat yellow and orange vegetables such as carrots, sweet potatoes, and winter squash.   Eat a variety of fruits every day.  Choose fresh or canned fruit (canned in its own juice or light syrup) instead of juice. Fruit juice has very little or no fiber.  Eat low-fat dairy foods.  Drink fat-free (skim) milk or 1% milk. Eat fat-free yogurt and low-fat cottage cheese. Try low-fat cheeses such as mozzarella and other reduced-fat cheeses.  Choose meat and other protein foods that are low in fat.  Choose beans or other legumes such as split peas or lentils. Choose fish, skinless poultry (chicken or turkey), or lean cuts  of red meat (beef or pork). Before you cook meat or poultry, cut off any visible fat.   Use less fat and oil.  Try baking foods instead of frying them. Add less fat, such as margarine, sour cream, regular salad dressing and mayonnaise to foods. Eat fewer high-fat foods. Some examples of high-fat foods include french fries, doughnuts, ice cream, and cakes.  Eat fewer sweets.  Limit foods and drinks that are high in sugar. This includes candy, cookies, regular soda, and sweetened drinks.  Exercise:  Exercise at least 30 minutes per day on most days of the week. Some examples of exercise include walking, biking, dancing, and swimming. You can also fit in more physical activity by taking the stairs instead of the elevator or parking farther away from stores. Ask your healthcare provider about the best exercise plan for you.      © Copyright [employer] 2018 Information is for End User's use only and may not be sold, redistributed or otherwise used for commercial purposes. All illustrations and images included in CareNotes® are the copyrighted property of A.[employer].A.M., Inc. or [employer]

## 2025-01-16 NOTE — ASSESSMENT & PLAN NOTE
Lab Results   Component Value Date    EGFR 50 10/31/2024    EGFR 55 (L) 02/06/2024    EGFR 52 (L) 01/11/2024    CREATININE 1.03 10/31/2024    CREATININE 1.02 02/06/2024    CREATININE 1.07 01/11/2024    baseline eGFR in the 50s. Most recent noted above. Will continue to monitor     Orders:    Comprehensive metabolic panel; Future    CBC and differential; Future

## 2025-01-16 NOTE — ASSESSMENT & PLAN NOTE
TSH from 6 months ago was within normal limits.  Patient on levothyroxine 50 mcg daily.  Patient to continue. Will continue to monitor and titrate dose accordingly   Orders:    TSH, 3rd generation with Free T4 reflex; Future

## 2025-01-16 NOTE — ASSESSMENT & PLAN NOTE
controlled with an A1c noted below..  Patient to continue metformin 500 mg q. nightly  Lab Results   Component Value Date    HGBA1C 7 (A) 01/16/2025       Orders:    POCT hemoglobin A1c    Hemoglobin A1C; Future    Lipid Panel with Direct LDL reflex; Future

## 2025-01-16 NOTE — PROGRESS NOTES
Name: Abena Savage      : 1940      MRN: [id number]  Encounter Provider: Muna Yoder DO  Encounter Date: 2025   Encounter department: [facility]    Assessment & Plan  Encounter for subsequent annual wellness visit (AWV) in Medicare patient         Type 2 diabetes mellitus with both eyes affected by mild nonproliferative retinopathy without macular edema, without long-term current use of insulin (HCC)  controlled with an A1c noted below..  Patient to continue metformin 500 mg q. nightly  Lab Results   Component Value Date    HGBA1C 7 (A) 2025       Orders:    POCT hemoglobin A1c    Hemoglobin A1C; Future    Lipid Panel with Direct LDL reflex; Future    SVT (supraventricular tachycardia) ([facility])  -s/p ablation 2019.  Asymptomatic       Morbid obesity with BMI of 40.0-44.9, adult ([facility])  Dietary changes and will at home exercises reviewed with patient       Stage 3a chronic kidney disease ([facility])  Lab Results   Component Value Date    EGFR 50 10/31/2024    EGFR 55 (L) 2024    EGFR 52 (L) 2024    CREATININE 1.03 10/31/2024    CREATININE 1.02 2024    CREATININE 1.07 2024    baseline eGFR in the 50s. Most recent noted above. Will continue to monitor     Orders:    Comprehensive metabolic panel; Future    CBC and differential; Future    Osteopenia of multiple sites  Noted on  DEXA.  Recommend calcium vitamin D supplementation.  Sent to pharmacy  Orders:    calcium carbonate-vitamin D 500 mg-5 mcg per tablet; Take 2 tablets by mouth daily with breakfast    Acquired hypothyroidism  TSH from 6 months ago was within normal limits.  Patient on levothyroxine 50 mcg daily.  Patient to continue. Will continue to monitor and titrate dose accordingly   Orders:    TSH, 3rd generation with Free T4 reflex; Future       Preventive health issues were discussed with patient, and age appropriate screening tests were ordered as noted in patient's After Visit  Summary. Personalized health advice and appropriate referrals for health education or preventive services given if needed, as noted in patient's After Visit Summary.    History of Present Illness     Patient presents for annual.  Reviewed diabetes with patient.  Discussed physical therapy over the summers for her knee.  Discussed cardiovascular history.       Patient Care Team:  Muna Yoder DO as PCP - General (Family Medicine)  MD Selma Roach MD Darius Desai, MD (Surgical Oncology)    Review of Systems   Respiratory:  Negative for shortness of breath.    Cardiovascular:  Negative for leg swelling.   Gastrointestinal:  Positive for constipation. Negative for diarrhea.   Musculoskeletal:  Positive for arthralgias and gait problem (uses rollater).   Neurological:  Negative for headaches.     Medical History Reviewed by provider this encounter:       Annual Wellness Visit Questionnaire   April is here for her Subsequent Wellness visit.     Health Risk Assessment:   Patient rates overall health as good. Patient feels that their physical health rating is same. Patient is satisfied with their life. Eyesight was rated as same. Hearing was rated as same. Patient feels that their emotional and mental health rating is same. Patients states they are sometimes angry. Patient states they are sometimes unusually tired/fatigued. Pain experienced in the last 7 days has been some. Patient's pain rating has been 5/10. Patient states that she has experienced no weight loss or gain in last 6 months.     Depression Screening:   PHQ-2 Score: 0      Fall Risk Screening:   In the past year, patient has experienced: no history of falling in past year      Urinary Incontinence Screening:   Patient has not leaked urine accidently in the last six months.     Home Safety:  Patient has trouble with stairs inside or outside of their home. Patient has working smoke alarms and has working carbon monoxide detector. Home  safety hazards include: none.     Nutrition:   Current diet is Regular.     Medications:   Patient is currently taking over-the-counter supplements. OTC medications include: see medication list. Patient is able to manage medications.     Activities of Daily Living (ADLs)/Instrumental Activities of Daily Living (IADLs):   Walk and transfer into and out of bed and chair?: Yes  Dress and groom yourself?: Yes    Bathe or shower yourself?: Yes    Feed yourself? Yes  Do your laundry/housekeeping?: Yes  Manage your money, pay your bills and track your expenses?: No  Make your own meals?: Yes    Do your own shopping?: Yes    Previous Hospitalizations:   Any hospitalizations or ED visits within the last 12 months?: No      Advance Care Planning:   Living will: Yes    Advanced directive: Yes      PREVENTIVE SCREENINGS      Cardiovascular Screening:    General: Screening Not Indicated and History Lipid Disorder      Diabetes Screening:     General: Screening Not Indicated and History Diabetes      Cervical Cancer Screening:    General: Screening Not Indicated      Lung Cancer Screening:     General: Screening Not Indicated    Screening, Brief Intervention, and Referral to Treatment (SBIRT)    Screening  Typical number of drinks in a day: 0  Typical number of drinks in a week: 0  Interpretation: Low risk drinking behavior.    AUDIT-C Screenin) How often did you have a drink containing alcohol in the past year? monthly or less  2) How many drinks did you have on a typical day when you were drinking in the past year? 1 to 2  3) How often did you have 6 or more drinks on one occasion in the past year? never    AUDIT-C Score: 1  Interpretation: Score 0-2 (female): Negative screen for alcohol misuse    Single Item Drug Screening:  How often have you used an illegal drug (including marijuana) or a prescription medication for non-medical reasons in the past year? never    Single Item Drug Screen Score: 0  Interpretation: Negative  "screen for possible drug use disorder    Social Drivers of Health     Financial Resource Strain: Low Risk  (1/10/2024)    [facility]    Overall Financial Resource Strain (CARDIA)     Difficulty of Paying Living Expenses: Not hard at all   Food Insecurity: No Food Insecurity (1/16/2025)    Hunger Vital Sign     Worried About Running Out of Food in the Last Year: Never true     Ran Out of Food in the Last Year: Never true   Transportation Needs: No Transportation Needs (1/16/2025)    PRAPARE - Transportation     Lack of Transportation (Medical): No     Lack of Transportation (Non-Medical): No   Housing Stability: Low Risk  (1/16/2025)    Housing Stability Vital Sign     Unable to Pay for Housing in the Last Year: No     Number of Times Moved in the Last Year: 0     Homeless in the Last Year: No   Utilities: Not At Risk (1/16/2025)    [facility] Utilities     Threatened with loss of utilities: No     No results found.    Objective   /70 (BP Location: Left arm, Patient Position: Sitting, Cuff Size: Large)   Pulse 67   Temp (!) 96.7 °F (35.9 °C) (Tympanic)   Ht 5' 4\" (1.626 m)   Wt 97.6 kg (215 lb 3.2 oz)   SpO2 97%   BMI 36.94 kg/m²     Physical Exam  HENT:      Head: Normocephalic.      Right Ear: External ear normal.      Left Ear: External ear normal.   Eyes:      Conjunctiva/sclera: Conjunctivae normal.   Cardiovascular:      Rate and Rhythm: Normal rate and regular rhythm.   Pulmonary:      Effort: Pulmonary effort is normal.      Breath sounds: Normal breath sounds.   Musculoskeletal:      Right lower leg: No edema.      Left lower leg: No edema.   Neurological:      Mental Status: She is alert and oriented to person, place, and time.      Gait: Gait abnormal (Antalgic).   Psychiatric:         Mood and Affect: Mood normal.         "

## 2025-02-05 DIAGNOSIS — I10 ESSENTIAL HYPERTENSION: ICD-10-CM

## 2025-02-06 RX ORDER — HYDROCHLOROTHIAZIDE 25 MG/1
25 TABLET ORAL DAILY
Qty: 90 TABLET | Refills: 1 | Status: SHIPPED | OUTPATIENT
Start: 2025-02-06

## 2025-02-09 DIAGNOSIS — M85.89 OSTEOPENIA OF MULTIPLE SITES: ICD-10-CM

## 2025-02-10 RX ORDER — CALCIUM CARBONATE/VITAMIN D3 500MG-5MCG
2 TABLET ORAL
Qty: 180 TABLET | Refills: 1 | Status: SHIPPED | OUTPATIENT
Start: 2025-02-10

## 2025-02-14 DIAGNOSIS — E03.9 HYPOTHYROIDISM, UNSPECIFIED TYPE: ICD-10-CM

## 2025-02-14 RX ORDER — LEVOTHYROXINE SODIUM 50 UG/1
50 TABLET ORAL DAILY
Qty: 90 TABLET | Refills: 1 | Status: SHIPPED | OUTPATIENT
Start: 2025-02-14

## 2025-02-14 NOTE — TELEPHONE ENCOUNTER
Reason for call:   [x] Refill   [] Prior Auth  [] Other:     Office:   [x] PCP/Provider - opal newsome   [] Specialty/Provider -     Medication:   levothyroxine 50 mcg tablet       Dose/Frequency: Sig: TAKE 1 TABLET BY MOUTH EVERY DAY      Quantity: 90    Pharmacy: [facility]/pharmacy #[id number] - [place] [address] [address]  Phone: [phone]  Fax: [phone]     Does the patient have enough for 3 days?   [] Yes   [x] No - Send as HP to POD

## 2025-03-17 DIAGNOSIS — I47.10 PAROXYSMAL SVT (SUPRAVENTRICULAR TACHYCARDIA) (HCC): ICD-10-CM

## 2025-03-18 RX ORDER — METOPROLOL SUCCINATE 25 MG/1
25 TABLET, EXTENDED RELEASE ORAL DAILY
Qty: 90 TABLET | Refills: 0 | Status: SHIPPED | OUTPATIENT
Start: 2025-03-18

## 2025-04-21 DIAGNOSIS — I10 HYPERTENSION, UNSPECIFIED TYPE: ICD-10-CM

## 2025-04-21 DIAGNOSIS — E11.3293 TYPE 2 DIABETES MELLITUS WITH BOTH EYES AFFECTED BY MILD NONPROLIFERATIVE RETINOPATHY WITHOUT MACULAR EDEMA, WITHOUT LONG-TERM CURRENT USE OF INSULIN (HCC): ICD-10-CM

## 2025-04-21 LAB
LEFT EYE DIABETIC RETINOPATHY: POSITIVE
RIGHT EYE DIABETIC RETINOPATHY: POSITIVE

## 2025-04-23 RX ORDER — METFORMIN HYDROCHLORIDE 500 MG/1
500 TABLET, EXTENDED RELEASE ORAL
Qty: 30 TABLET | Refills: 0 | Status: SHIPPED | OUTPATIENT
Start: 2025-04-23

## 2025-04-23 RX ORDER — RAMIPRIL 5 MG/1
5 CAPSULE ORAL DAILY
Qty: 30 CAPSULE | Refills: 0 | Status: SHIPPED | OUTPATIENT
Start: 2025-04-23

## 2025-04-25 DIAGNOSIS — E78.2 MIXED HYPERLIPIDEMIA: ICD-10-CM

## 2025-04-25 RX ORDER — SIMVASTATIN 10 MG
10 TABLET ORAL DAILY
Qty: 90 TABLET | Refills: 1 | Status: SHIPPED | OUTPATIENT
Start: 2025-04-25

## 2025-04-25 NOTE — TELEPHONE ENCOUNTER
Reason for call:   [x] Refill   [] Prior Auth  [] Other:     Office:   [x] PCP/Provider -   [] Specialty/Provider -     Medication: simvastatin (ZOCOR) 10 mg tablet     Dose/Frequency: Sig: Take 1 tablet (10 mg total) by mouth daily      Quantity: 90    Pharmacy:   [facility]/pharmacy #[id number] - [address] [address]  Phone: [phone]  Fax: [phone]    Local Pharmacy   Does the patient have enough for 3 days?   [x] Yes   [] No - Send as HP to POD    Mail Away Pharmacy   Does the patient have enough for 10 days?   [] Yes   [] No - Send as HP to POD

## 2025-05-09 ENCOUNTER — APPOINTMENT (OUTPATIENT)
Age: 85
End: 2025-05-09
Payer: COMMERCIAL

## 2025-05-09 DIAGNOSIS — N18.31 STAGE 3A CHRONIC KIDNEY DISEASE (HCC): ICD-10-CM

## 2025-05-09 DIAGNOSIS — E03.9 ACQUIRED HYPOTHYROIDISM: ICD-10-CM

## 2025-05-09 DIAGNOSIS — E11.3293 TYPE 2 DIABETES MELLITUS WITH BOTH EYES AFFECTED BY MILD NONPROLIFERATIVE RETINOPATHY WITHOUT MACULAR EDEMA, WITHOUT LONG-TERM CURRENT USE OF INSULIN (HCC): ICD-10-CM

## 2025-05-09 LAB
ALBUMIN SERPL BCG-MCNC: 4.3 G/DL (ref 3.5–5)
ALP SERPL-CCNC: 66 U/L (ref 34–104)
ALT SERPL W P-5'-P-CCNC: 10 U/L (ref 7–52)
ANION GAP SERPL CALCULATED.3IONS-SCNC: 9 MMOL/L (ref 4–13)
AST SERPL W P-5'-P-CCNC: 15 U/L (ref 13–39)
BASOPHILS # BLD AUTO: 0.05 THOUSANDS/ÂΜL (ref 0–0.1)
BASOPHILS NFR BLD AUTO: 1 % (ref 0–1)
BILIRUB SERPL-MCNC: 0.7 MG/DL (ref 0.2–1)
BUN SERPL-MCNC: 18 MG/DL (ref 5–25)
CALCIUM SERPL-MCNC: 9.7 MG/DL (ref 8.4–10.2)
CHLORIDE SERPL-SCNC: 99 MMOL/L (ref 96–108)
CHOLEST SERPL-MCNC: 153 MG/DL (ref ?–200)
CO2 SERPL-SCNC: 32 MMOL/L (ref 21–32)
CREAT SERPL-MCNC: 1.05 MG/DL (ref 0.6–1.3)
EOSINOPHIL # BLD AUTO: 0.15 THOUSAND/ÂΜL (ref 0–0.61)
EOSINOPHIL NFR BLD AUTO: 2 % (ref 0–6)
ERYTHROCYTE [DISTWIDTH] IN BLOOD BY AUTOMATED COUNT: 12.8 % (ref 11.6–15.1)
EST. AVERAGE GLUCOSE BLD GHB EST-MCNC: 151 MG/DL
GFR SERPL CREATININE-BSD FRML MDRD: 49 ML/MIN/1.73SQ M
GLUCOSE P FAST SERPL-MCNC: 175 MG/DL (ref 65–99)
HBA1C MFR BLD: 6.9 %
HCT VFR BLD AUTO: 42.7 % (ref 34.8–46.1)
HDLC SERPL-MCNC: 60 MG/DL
HGB BLD-MCNC: 14.1 G/DL (ref 11.5–15.4)
IMM GRANULOCYTES # BLD AUTO: 0.03 THOUSAND/UL (ref 0–0.2)
IMM GRANULOCYTES NFR BLD AUTO: 1 % (ref 0–2)
LDLC SERPL CALC-MCNC: 66 MG/DL (ref 0–100)
LYMPHOCYTES # BLD AUTO: 1.3 THOUSANDS/ÂΜL (ref 0.6–4.47)
LYMPHOCYTES NFR BLD AUTO: 21 % (ref 14–44)
MCH RBC QN AUTO: 29.7 PG (ref 26.8–34.3)
MCHC RBC AUTO-ENTMCNC: 33 G/DL (ref 31.4–37.4)
MCV RBC AUTO: 90 FL (ref 82–98)
MONOCYTES # BLD AUTO: 0.44 THOUSAND/ÂΜL (ref 0.17–1.22)
MONOCYTES NFR BLD AUTO: 7 % (ref 4–12)
NEUTROPHILS # BLD AUTO: 4.36 THOUSANDS/ÂΜL (ref 1.85–7.62)
NEUTS SEG NFR BLD AUTO: 68 % (ref 43–75)
NRBC BLD AUTO-RTO: 0 /100 WBCS
PLATELET # BLD AUTO: 237 THOUSANDS/UL (ref 149–390)
PMV BLD AUTO: 10.6 FL (ref 8.9–12.7)
POTASSIUM SERPL-SCNC: 4 MMOL/L (ref 3.5–5.3)
PROT SERPL-MCNC: 7 G/DL (ref 6.4–8.4)
RBC # BLD AUTO: 4.74 MILLION/UL (ref 3.81–5.12)
SODIUM SERPL-SCNC: 140 MMOL/L (ref 135–147)
TRIGL SERPL-MCNC: 135 MG/DL (ref ?–150)
TSH SERPL DL<=0.05 MIU/L-ACNC: 1.68 UIU/ML (ref 0.45–4.5)
WBC # BLD AUTO: 6.33 THOUSAND/UL (ref 4.31–10.16)

## 2025-05-09 PROCEDURE — 36415 COLL VENOUS BLD VENIPUNCTURE: CPT

## 2025-05-09 PROCEDURE — 80053 COMPREHEN METABOLIC PANEL: CPT

## 2025-05-09 PROCEDURE — 80061 LIPID PANEL: CPT

## 2025-05-09 PROCEDURE — 84443 ASSAY THYROID STIM HORMONE: CPT

## 2025-05-09 PROCEDURE — 83036 HEMOGLOBIN GLYCOSYLATED A1C: CPT

## 2025-05-09 PROCEDURE — 85025 COMPLETE CBC W/AUTO DIFF WBC: CPT

## 2025-05-15 ENCOUNTER — RA CDI HCC (OUTPATIENT)
Dept: OTHER | Facility: HOSPITAL | Age: 85
End: 2025-05-15

## 2025-05-15 ENCOUNTER — RESULTS FOLLOW-UP (OUTPATIENT)
Age: 85
End: 2025-05-15

## 2025-05-22 ENCOUNTER — TELEPHONE (OUTPATIENT)
Dept: ADMINISTRATIVE | Facility: OTHER | Age: 85
End: 2025-05-22

## 2025-05-22 ENCOUNTER — OFFICE VISIT (OUTPATIENT)
Age: 85
End: 2025-05-22
Payer: COMMERCIAL

## 2025-05-22 VITALS
TEMPERATURE: 97.6 F | HEIGHT: 64 IN | BODY MASS INDEX: 37.63 KG/M2 | HEART RATE: 66 BPM | RESPIRATION RATE: 16 BRPM | SYSTOLIC BLOOD PRESSURE: 128 MMHG | OXYGEN SATURATION: 98 % | DIASTOLIC BLOOD PRESSURE: 62 MMHG | WEIGHT: 220.4 LBS

## 2025-05-22 DIAGNOSIS — E03.9 ACQUIRED HYPOTHYROIDISM: ICD-10-CM

## 2025-05-22 DIAGNOSIS — R26.2 AMBULATORY DYSFUNCTION: ICD-10-CM

## 2025-05-22 DIAGNOSIS — E11.3293 TYPE 2 DIABETES MELLITUS WITH BOTH EYES AFFECTED BY MILD NONPROLIFERATIVE RETINOPATHY WITHOUT MACULAR EDEMA, WITHOUT LONG-TERM CURRENT USE OF INSULIN (HCC): Primary | ICD-10-CM

## 2025-05-22 DIAGNOSIS — E78.5 DYSLIPIDEMIA: ICD-10-CM

## 2025-05-22 DIAGNOSIS — I10 ESSENTIAL HYPERTENSION: ICD-10-CM

## 2025-05-22 PROCEDURE — 99214 OFFICE O/P EST MOD 30 MIN: CPT | Performed by: FAMILY MEDICINE

## 2025-05-22 PROCEDURE — G2211 COMPLEX E/M VISIT ADD ON: HCPCS | Performed by: FAMILY MEDICINE

## 2025-05-22 NOTE — LETTER
Diabetic Eye Exam Form    Date Requested: 25  Patient: Abena Savage  Patient : 1940   Referring Provider: Muna Yoder DO      DIABETIC Eye Exam Date _______________________________      Type of Exam MUST be documented for Diabetic Eye Exams. Please CHECK ONE.     Retinal Exam       Dilated Retinal Exam       OCT       Optomap-Iris Exam      Fundus Photography       Left Eye - Please check Retinopathy or No Retinopathy        Exam did show retinopathy    Exam did not show retinopathy       Right Eye - Please check Retinopathy or No Retinopathy       Exam did show retinopathy    Exam did not show retinopathy       Comments __________________________________________________________    Practice Providing Exam ______________________________________________    Exam Performed By (print name) _______________________________________      Provider Signature ___________________________________________________      These reports are needed for  compliance.    Please fax this completed form and a copy of the Diabetic Eye Exam report to the [facility] Based Department as soon as possible via Fax [phone], attention [place]: Phone [phone]. Our office is located at [address] [place] [address].     We thank you for your assistance in treating our mutual patient.   [facility] - [phone] F [phone]

## 2025-05-22 NOTE — PROGRESS NOTES
Diabetic Foot Exam    Patient's shoes and socks removed.    Right Foot/Ankle   Right Foot Inspection  Skin Exam: skin normal and skin intact. No dry skin, no warmth, no callus, no erythema, no maceration, no abnormal color, no pre-ulcer, no ulcer and no callus.     Toe Exam: ROM and strength within normal limits.     Sensory   Monofilament testing: intact    Vascular  Capillary refills: < 3 seconds  The right DP pulse is 2+.     Left Foot/Ankle  Left Foot Inspection  Skin Exam: skin normal and skin intact. No dry skin, no warmth, no erythema, no maceration, normal color, no pre-ulcer, no ulcer and no callus.     Toe Exam: ROM and strength within normal limits.     Sensory   Monofilament testing: intact    Vascular  Capillary refills: < 3 seconds  The left DP pulse is 2+.     Assign Risk Category  No deformity present  Loss of protective sensation  No weak pulses  Risk: 1              Name: Abena Savage      : 1940      MRN: [id number]  Encounter Provider: Muna Yoder DO  Encounter Date: 2025   Encounter department: [facility] [place]  :      Assessment & Plan  Type 2 diabetes mellitus with both eyes affected by mild nonproliferative retinopathy without macular edema, without long-term current use of insulin (HCC)  Well-controlled with metformin 500 mg daily  Lab Results   Component Value Date    HGBA1C 6.9 (H) 2025            Essential hypertension   Patient to continue levothyroxine 50 mcg daily controlled on lisinopril 5 mg daily, hydrochlorothiazide 25 mg daily and metoprolol 25 mg daily          Acquired hypothyroidism  TSH within normal limits on current dose of levothyroxine.     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 Uses walker for long distances. Would benefit from PT. Referral placed.   Orders:    Ambulatory Referral to Physical Therapy; Future           History of Present  "Illness   Presents for follow-up.  Discussed diabetes.  He states.  Discussed poor ambulation.  No recent falls.        Review of Systems   Respiratory:  Negative for shortness of breath.    Cardiovascular:  Negative for chest pain and leg swelling.   Gastrointestinal:  Negative for blood in stool, constipation and diarrhea.   Musculoskeletal:  Positive for gait problem. Negative for arthralgias and back pain.       Objective   /62 (BP Location: Right arm, Patient Position: Sitting, Cuff Size: Standard)   Pulse 66   Temp 97.6 °F (36.4 °C) (Tympanic)   Resp 16   Ht 5' 4\" (1.626 m)   Wt 100 kg (220 lb 6.4 oz)   SpO2 98%   BMI 37.83 kg/m²      Physical Exam  HENT:      Head: Normocephalic.     Eyes:      Conjunctiva/sclera: Conjunctivae normal.       Cardiovascular:      Rate and Rhythm: Normal rate and regular rhythm.      Pulses: no weak pulses.           Dorsalis pedis pulses are 2+ on the right side and 2+ on the left side.      Heart sounds: Murmur heard.   Pulmonary:      Effort: Pulmonary effort is normal.      Breath sounds: Normal breath sounds.     Musculoskeletal:      Right lower leg: No edema.      Left lower leg: No edema.        Feet:       Comments: B/l varicose veins on b/l lower ext    Feet:      Right foot:      Skin integrity: No ulcer, skin breakdown, erythema, warmth, callus or dry skin.      Left foot:      Skin integrity: No ulcer, skin breakdown, erythema, warmth, callus or dry skin.     Neurological:      Mental Status: She is alert and oriented to person, place, and time.      Gait: Gait abnormal (uses walker).     Psychiatric:         Mood and Affect: Mood normal.         Behavior: Behavior normal.         "

## 2025-05-22 NOTE — TELEPHONE ENCOUNTER
----- Message from Faby ELKINS sent at 5/22/2025 10:47 AM EDT -----  Regarding: Diabetic Eye Exam  05/22/25 10:47 Shahab, our patient April Janette has had Diabetic Eye Exam completed/performed. Please assist in updating the patient chart by making an External outreach to [facility]  facility located in [place]. The date of service is 2 weeks ago.Thank you,[facility] [place] [facility] [place]

## 2025-05-22 NOTE — LETTER
Diabetic Eye Exam Form    Date Requested: 25  Patient: Abena Savage  Patient : 1940   Referring Provider: Muna Yoder DO      DIABETIC Eye Exam Date _______________________________      Type of Exam MUST be documented for Diabetic Eye Exams. Please CHECK ONE.     Retinal Exam       Dilated Retinal Exam       OCT       Optomap-Iris Exam      Fundus Photography       Left Eye - Please check Retinopathy or No Retinopathy        Exam did show retinopathy    Exam did not show retinopathy       Right Eye - Please check Retinopathy or No Retinopathy       Exam did show retinopathy    Exam did not show retinopathy       Comments __________________________________________________________    Practice Providing Exam ______________________________________________    Exam Performed By (print name) _______________________________________      Provider Signature ___________________________________________________      These reports are needed for  compliance.    Please fax this completed form and a copy of the Diabetic Eye Exam report to the [facility] as soon as possible via Fax [phone], attention [place]: Phone [phone]. Our office is located at [address] [place] [address].     We thank you for your assistance in treating our mutual patient.   [facility] - [phone] F [phone]

## 2025-05-22 NOTE — ASSESSMENT & PLAN NOTE
Well-controlled with metformin 500 mg daily  Lab Results   Component Value Date    HGBA1C 6.9 (H) 05/09/2025

## 2025-05-22 NOTE — ASSESSMENT & PLAN NOTE
Patient to continue levothyroxine 50 mcg daily controlled on lisinopril 5 mg daily, hydrochlorothiazide 25 mg daily and metoprolol 25 mg daily

## 2025-05-23 NOTE — TELEPHONE ENCOUNTER
Upon review of the In Basket request and the patient's chart, initial outreach has been made via fax to facility. Please see Contacts section for details.     Thank you  Choco Sibley MA

## 2025-06-02 NOTE — TELEPHONE ENCOUNTER
As a follow-up, a second attempt has been made for outreach via fax to facility. Please see Contacts section for details.    Thank you  Choco Sibley MA

## 2025-06-09 NOTE — TELEPHONE ENCOUNTER
Upon review of the In Basket request we were able to locate, review, and update the patient chart as requested for Diabetic Eye Exam.    Any additional questions or concerns should be emailed to the Practice Liaisons via the appropriate education email address, please do not reply via In Basket.    Thank you  Choco Sibley MA   [facility]

## 2025-07-08 ENCOUNTER — HOSPITAL ENCOUNTER (OUTPATIENT)
Dept: ULTRASOUND IMAGING | Facility: CLINIC | Age: 85
Discharge: HOME | End: 2025-07-08
Payer: COMMERCIAL

## 2025-07-08 DIAGNOSIS — E04.2 MULTIPLE THYROID NODULES: ICD-10-CM

## 2025-07-08 PROCEDURE — 76536 US EXAM OF HEAD AND NECK: CPT

## 2025-07-15 DIAGNOSIS — I10 HYPERTENSION, UNSPECIFIED TYPE: ICD-10-CM

## 2025-07-15 RX ORDER — RAMIPRIL 5 MG/1
5 CAPSULE ORAL DAILY
Qty: 90 CAPSULE | Refills: 1 | Status: SHIPPED | OUTPATIENT
Start: 2025-07-15

## 2025-07-26 DIAGNOSIS — E11.3293 TYPE 2 DIABETES MELLITUS WITH BOTH EYES AFFECTED BY MILD NONPROLIFERATIVE RETINOPATHY WITHOUT MACULAR EDEMA, WITHOUT LONG-TERM CURRENT USE OF INSULIN (HCC): ICD-10-CM

## 2025-07-28 RX ORDER — METFORMIN HYDROCHLORIDE 500 MG/1
500 TABLET, EXTENDED RELEASE ORAL
Qty: 90 TABLET | Refills: 1 | Status: SHIPPED | OUTPATIENT
Start: 2025-07-28

## (undated) DEVICE — 3M™ STERI-STRIP™ REINFORCED ADHESIVE SKIN CLOSURES, R1547, 1/2 IN X 4 IN (12 MM X 100 MM), 6 STRIPS/ENVELOPE: Brand: 3M™ STERI-STRIP™

## (undated) DEVICE — CHLORAPREP HI-LITE 26ML ORANGE

## (undated) DEVICE — 3M™ STERI-STRIP™ REINFORCED ADHESIVE SKIN CLOSURES, R1542, 1/4 IN X 1-1/2 IN (6 MM X 38 MM), 6 STRIPS/ENVELOPE: Brand: 3M™ STERI-STRIP™

## (undated) DEVICE — VIAL DECANTER

## (undated) DEVICE — INTENDED FOR TISSUE SEPARATION, AND OTHER PROCEDURES THAT REQUIRE A SHARP SURGICAL BLADE TO PUNCTURE OR CUT.: Brand: BARD-PARKER SAFETY BLADES SIZE 15, STERILE

## (undated) DEVICE — GLOVE SRG BIOGEL ECLIPSE 8

## (undated) DEVICE — BULB SYRINGE,IRRIGATION WITH PROTECTIVE CAP: Brand: DOVER

## (undated) DEVICE — 3M™ TEGADERM™ TRANSPARENT FILM DRESSING FRAME STYLE, 1626W, 4 IN X 4-3/4 IN (10 CM X 12 CM), 50/CT 4CT/CASE: Brand: 3M™ TEGADERM™

## (undated) DEVICE — PAD GROUNDING ADULT

## (undated) DEVICE — NERVE STIMULATOR HAND HELD

## (undated) DEVICE — LIGHT HANDLE COVER SLEEVE DISP BLUE STELLAR

## (undated) DEVICE — SUT VICRYL 4-0 SH 27 IN J415H

## (undated) DEVICE — SUT SILK 2-0 SH CR/8 18 IN C012D

## (undated) DEVICE — PACK UNIVERSAL NECK

## (undated) DEVICE — SURGICEL 2 X 3

## (undated) DEVICE — HARMONIC FOCUS SHEARS 9CM LENGTH + ADAPTIVE TISSUE TECHNOLOGY FOR USE WITH BLUE HAND PIECE ONLY: Brand: HARMONIC FOCUS

## (undated) DEVICE — SUT VICRYL 3-0 SH 27 IN J416H

## (undated) DEVICE — GAUZE SPONGES,USP TYPE VII GAUZE, 12 PLY: Brand: CURITY

## (undated) DEVICE — MEDI-VAC YANK SUCT HNDL W/TPRD BULBOUS TIP: Brand: CARDINAL HEALTH

## (undated) DEVICE — SUT MONOCRYL PLUS 4-0 PS-2 18 IN MCP496G

## (undated) DEVICE — LIGACLIP MCA MULTIPLE CLIP APPLIERS, 20 SMALL CLIPS: Brand: LIGACLIP

## (undated) DEVICE — SUT VICRYL 2-0 SH 27 IN UNDYED J417H

## (undated) DEVICE — SUT VICRYL 2-0 18 IN J911T

## (undated) DEVICE — THYROID SHEET: Brand: CONVERTORS

## (undated) DEVICE — GLOVE INDICATOR PI UNDERGLOVE SZ 8 BLUE

## (undated) DEVICE — SPONGE 4 X 4 XRAY 16 PLY STRL LF RFD

## (undated) DEVICE — SUT SILK 2-0 18 IN A185H

## (undated) DEVICE — GAUZE SPONGES,16 PLY: Brand: CURITY